# Patient Record
Sex: MALE | Race: WHITE | Employment: OTHER | URBAN - METROPOLITAN AREA
[De-identification: names, ages, dates, MRNs, and addresses within clinical notes are randomized per-mention and may not be internally consistent; named-entity substitution may affect disease eponyms.]

---

## 2018-03-21 ENCOUNTER — HOSPITAL ENCOUNTER (OUTPATIENT)
Dept: SLEEP CENTER | Age: 68
Discharge: HOME | End: 2018-03-21
Payer: MEDICARE

## 2018-03-21 PROCEDURE — 95810 POLYSOM 6/> YRS 4/> PARAM: CPT

## 2018-04-04 ENCOUNTER — OFFICE VISIT (OUTPATIENT)
Dept: FAMILY MEDICINE CLINIC | Age: 68
End: 2018-04-04
Payer: MEDICARE

## 2018-04-04 VITALS
DIASTOLIC BLOOD PRESSURE: 80 MMHG | HEART RATE: 67 BPM | BODY MASS INDEX: 30.1 KG/M2 | SYSTOLIC BLOOD PRESSURE: 130 MMHG | HEIGHT: 71 IN | WEIGHT: 215 LBS | OXYGEN SATURATION: 98 %

## 2018-04-04 DIAGNOSIS — R07.81 RIB PAIN ON LEFT SIDE: Primary | ICD-10-CM

## 2018-04-04 PROCEDURE — G8417 CALC BMI ABV UP PARAM F/U: HCPCS | Performed by: PHYSICIAN ASSISTANT

## 2018-04-04 PROCEDURE — G8427 DOCREV CUR MEDS BY ELIG CLIN: HCPCS | Performed by: PHYSICIAN ASSISTANT

## 2018-04-04 PROCEDURE — 1123F ACP DISCUSS/DSCN MKR DOCD: CPT | Performed by: PHYSICIAN ASSISTANT

## 2018-04-04 PROCEDURE — 3017F COLORECTAL CA SCREEN DOC REV: CPT | Performed by: PHYSICIAN ASSISTANT

## 2018-04-04 PROCEDURE — 1036F TOBACCO NON-USER: CPT | Performed by: PHYSICIAN ASSISTANT

## 2018-04-04 PROCEDURE — 96372 THER/PROPH/DIAG INJ SC/IM: CPT | Performed by: PHYSICIAN ASSISTANT

## 2018-04-04 PROCEDURE — 99213 OFFICE O/P EST LOW 20 MIN: CPT | Performed by: PHYSICIAN ASSISTANT

## 2018-04-04 PROCEDURE — 4040F PNEUMOC VAC/ADMIN/RCVD: CPT | Performed by: PHYSICIAN ASSISTANT

## 2018-04-04 RX ORDER — IBUPROFEN 800 MG/1
800 TABLET ORAL EVERY 6 HOURS PRN
Qty: 30 TABLET | Refills: 0 | Status: CANCELLED | OUTPATIENT
Start: 2018-04-04

## 2018-04-04 RX ORDER — MELOXICAM 7.5 MG/1
7.5 TABLET ORAL DAILY
Qty: 15 TABLET | Refills: 0 | Status: SHIPPED | OUTPATIENT
Start: 2018-04-04 | End: 2018-04-19 | Stop reason: CLARIF

## 2018-04-06 NOTE — PROGRESS NOTES
[address]                                SLEEP STUDY REPORT    PATIENT NAME: Bing Merritt                        :        1950  MED REC NO:   [id number]                            ROOM:  ACCOUNT NO:   [de-identified]                           ADMIT DATE: 2018  PROVIDER:     Ras Ellsworth MD    DATE OF STUDY:  2018    The patient had diagnostic PSG and this is an over-read report of Dr. Svitlana Díaz. I agree with the interpretation. The patient had good quantity and quality  of sleep. Clinical correlation is needed. Appropriate followup is  recommended.         Everett Lake MD    D: 2018 16:19:57       T: 2018 5:33:26     [email or web address]  Job#: [id number]     Doc#: [id number]    CC:
assess the hypersomnia and  rule out narcolepsy; particularly, if there is other criteria that may  suggest this diagnosis. Thank you kindly.         David Alexis MD    D: 03/25/2018 13:39:58       T: 03/26/2018 1:35:37     [email or web address]  Job#: [id number]     Doc#: [id number]    CC:

## 2018-04-19 ENCOUNTER — OFFICE VISIT (OUTPATIENT)
Dept: FAMILY MEDICINE CLINIC | Age: 68
End: 2018-04-19
Payer: MEDICARE

## 2018-04-19 VITALS
WEIGHT: 209 LBS | BODY MASS INDEX: 29.26 KG/M2 | RESPIRATION RATE: 18 BRPM | SYSTOLIC BLOOD PRESSURE: 138 MMHG | OXYGEN SATURATION: 99 % | HEART RATE: 83 BPM | DIASTOLIC BLOOD PRESSURE: 82 MMHG | HEIGHT: 71 IN

## 2018-04-19 DIAGNOSIS — I10 ESSENTIAL HYPERTENSION: Primary | ICD-10-CM

## 2018-04-19 DIAGNOSIS — E11.9 TYPE 2 DIABETES MELLITUS WITHOUT COMPLICATION, WITHOUT LONG-TERM CURRENT USE OF INSULIN (HCC): ICD-10-CM

## 2018-04-19 DIAGNOSIS — F33.1 MODERATE EPISODE OF RECURRENT MAJOR DEPRESSIVE DISORDER (HCC): ICD-10-CM

## 2018-04-19 LAB — HBA1C MFR BLD: 5.7 %

## 2018-04-19 PROCEDURE — 99213 OFFICE O/P EST LOW 20 MIN: CPT | Performed by: FAMILY MEDICINE

## 2018-04-19 PROCEDURE — 83036 HEMOGLOBIN GLYCOSYLATED A1C: CPT | Performed by: FAMILY MEDICINE

## 2018-04-19 PROCEDURE — 3044F HG A1C LEVEL LT 7.0%: CPT | Performed by: FAMILY MEDICINE

## 2018-04-19 PROCEDURE — G8417 CALC BMI ABV UP PARAM F/U: HCPCS | Performed by: FAMILY MEDICINE

## 2018-04-19 PROCEDURE — 3017F COLORECTAL CA SCREEN DOC REV: CPT | Performed by: FAMILY MEDICINE

## 2018-04-19 PROCEDURE — G8427 DOCREV CUR MEDS BY ELIG CLIN: HCPCS | Performed by: FAMILY MEDICINE

## 2018-04-19 PROCEDURE — 1036F TOBACCO NON-USER: CPT | Performed by: FAMILY MEDICINE

## 2018-04-19 PROCEDURE — 1123F ACP DISCUSS/DSCN MKR DOCD: CPT | Performed by: FAMILY MEDICINE

## 2018-04-19 PROCEDURE — 2022F DILAT RTA XM EVC RTNOPTHY: CPT | Performed by: FAMILY MEDICINE

## 2018-04-19 PROCEDURE — 4040F PNEUMOC VAC/ADMIN/RCVD: CPT | Performed by: FAMILY MEDICINE

## 2018-04-19 RX ORDER — FLUOXETINE 10 MG/1
10 TABLET, FILM COATED ORAL DAILY
Qty: 30 TABLET | Refills: 3 | Status: SHIPPED | OUTPATIENT
Start: 2018-04-19 | End: 2018-05-17 | Stop reason: DRUGHIGH

## 2018-04-19 RX ORDER — AMLODIPINE BESYLATE 5 MG/1
5 TABLET ORAL DAILY
Qty: 90 TABLET | Refills: 3 | Status: SHIPPED | OUTPATIENT
Start: 2018-04-19 | End: 2018-05-17 | Stop reason: SDUPTHER

## 2018-04-19 RX ORDER — ATORVASTATIN CALCIUM 20 MG/1
20 TABLET, FILM COATED ORAL DAILY
Qty: 90 TABLET | Refills: 3 | Status: SHIPPED | OUTPATIENT
Start: 2018-04-19 | End: 2019-05-16 | Stop reason: SDUPTHER

## 2018-04-19 ASSESSMENT — ENCOUNTER SYMPTOMS
SHORTNESS OF BREATH: 0
WHEEZING: 0
COUGH: 0

## 2018-04-26 RX ORDER — MINERAL OIL 100 MG/100ML
OIL ORAL; TOPICAL
Qty: 1 DEVICE | Refills: 0 | Status: SHIPPED | OUTPATIENT
Start: 2018-04-26

## 2018-05-17 ENCOUNTER — OFFICE VISIT (OUTPATIENT)
Dept: FAMILY MEDICINE CLINIC | Age: 68
End: 2018-05-17
Payer: MEDICARE

## 2018-05-17 VITALS
SYSTOLIC BLOOD PRESSURE: 122 MMHG | HEART RATE: 76 BPM | BODY MASS INDEX: 28.38 KG/M2 | OXYGEN SATURATION: 97 % | HEIGHT: 71 IN | WEIGHT: 202.7 LBS | RESPIRATION RATE: 18 BRPM | DIASTOLIC BLOOD PRESSURE: 72 MMHG

## 2018-05-17 DIAGNOSIS — I10 ESSENTIAL HYPERTENSION: ICD-10-CM

## 2018-05-17 DIAGNOSIS — F33.1 MODERATE EPISODE OF RECURRENT MAJOR DEPRESSIVE DISORDER (HCC): Primary | ICD-10-CM

## 2018-05-17 DIAGNOSIS — M54.5 CHRONIC MIDLINE LOW BACK PAIN, WITH SCIATICA PRESENCE UNSPECIFIED: ICD-10-CM

## 2018-05-17 DIAGNOSIS — G89.29 CHRONIC MIDLINE LOW BACK PAIN, WITH SCIATICA PRESENCE UNSPECIFIED: ICD-10-CM

## 2018-05-17 PROCEDURE — G8427 DOCREV CUR MEDS BY ELIG CLIN: HCPCS | Performed by: FAMILY MEDICINE

## 2018-05-17 PROCEDURE — 99213 OFFICE O/P EST LOW 20 MIN: CPT | Performed by: FAMILY MEDICINE

## 2018-05-17 PROCEDURE — G8417 CALC BMI ABV UP PARAM F/U: HCPCS | Performed by: FAMILY MEDICINE

## 2018-05-17 PROCEDURE — 3017F COLORECTAL CA SCREEN DOC REV: CPT | Performed by: FAMILY MEDICINE

## 2018-05-17 PROCEDURE — 1123F ACP DISCUSS/DSCN MKR DOCD: CPT | Performed by: FAMILY MEDICINE

## 2018-05-17 PROCEDURE — 1036F TOBACCO NON-USER: CPT | Performed by: FAMILY MEDICINE

## 2018-05-17 PROCEDURE — 4040F PNEUMOC VAC/ADMIN/RCVD: CPT | Performed by: FAMILY MEDICINE

## 2018-05-17 RX ORDER — FLUOXETINE 20 MG/1
20 TABLET, FILM COATED ORAL DAILY
Qty: 30 TABLET | Refills: 5 | Status: SHIPPED | OUTPATIENT
Start: 2018-05-17 | End: 2018-05-29

## 2018-05-17 RX ORDER — AMLODIPINE BESYLATE 5 MG/1
5 TABLET ORAL DAILY
Qty: 90 TABLET | Refills: 6 | Status: SHIPPED | OUTPATIENT
Start: 2018-05-17 | End: 2019-02-18 | Stop reason: SDUPTHER

## 2018-05-17 ASSESSMENT — ENCOUNTER SYMPTOMS
WHEEZING: 0
VOMITING: 0
SHORTNESS OF BREATH: 0
NAUSEA: 0
ABDOMINAL PAIN: 0
CONSTIPATION: 0
DIARRHEA: 0
COUGH: 0

## 2018-05-29 ENCOUNTER — TELEPHONE (OUTPATIENT)
Dept: FAMILY MEDICINE CLINIC | Age: 68
End: 2018-05-29

## 2018-05-29 DIAGNOSIS — F33.1 MODERATE EPISODE OF RECURRENT MAJOR DEPRESSIVE DISORDER (HCC): Primary | ICD-10-CM

## 2018-05-29 RX ORDER — ESCITALOPRAM OXALATE 10 MG/1
10 TABLET ORAL DAILY
Qty: 30 TABLET | Refills: 3 | Status: SHIPPED | OUTPATIENT
Start: 2018-05-29 | End: 2018-07-19 | Stop reason: SDUPTHER

## 2018-07-19 ENCOUNTER — OFFICE VISIT (OUTPATIENT)
Dept: FAMILY MEDICINE CLINIC | Age: 68
End: 2018-07-19
Payer: MEDICARE

## 2018-07-19 VITALS
OXYGEN SATURATION: 99 % | WEIGHT: 203 LBS | SYSTOLIC BLOOD PRESSURE: 134 MMHG | HEIGHT: 71 IN | DIASTOLIC BLOOD PRESSURE: 82 MMHG | RESPIRATION RATE: 18 BRPM | HEART RATE: 71 BPM | BODY MASS INDEX: 28.42 KG/M2

## 2018-07-19 DIAGNOSIS — H93.13 TINNITUS OF BOTH EARS: ICD-10-CM

## 2018-07-19 DIAGNOSIS — F33.1 MODERATE EPISODE OF RECURRENT MAJOR DEPRESSIVE DISORDER (HCC): Primary | ICD-10-CM

## 2018-07-19 DIAGNOSIS — I10 ESSENTIAL HYPERTENSION: ICD-10-CM

## 2018-07-19 PROCEDURE — 4040F PNEUMOC VAC/ADMIN/RCVD: CPT | Performed by: FAMILY MEDICINE

## 2018-07-19 PROCEDURE — G8427 DOCREV CUR MEDS BY ELIG CLIN: HCPCS | Performed by: FAMILY MEDICINE

## 2018-07-19 PROCEDURE — 1101F PT FALLS ASSESS-DOCD LE1/YR: CPT | Performed by: FAMILY MEDICINE

## 2018-07-19 PROCEDURE — 3017F COLORECTAL CA SCREEN DOC REV: CPT | Performed by: FAMILY MEDICINE

## 2018-07-19 PROCEDURE — 99213 OFFICE O/P EST LOW 20 MIN: CPT | Performed by: FAMILY MEDICINE

## 2018-07-19 PROCEDURE — 1036F TOBACCO NON-USER: CPT | Performed by: FAMILY MEDICINE

## 2018-07-19 PROCEDURE — G8417 CALC BMI ABV UP PARAM F/U: HCPCS | Performed by: FAMILY MEDICINE

## 2018-07-19 PROCEDURE — 1123F ACP DISCUSS/DSCN MKR DOCD: CPT | Performed by: FAMILY MEDICINE

## 2018-07-19 RX ORDER — ESCITALOPRAM OXALATE 20 MG/1
20 TABLET ORAL DAILY
Qty: 90 TABLET | Refills: 3 | Status: SHIPPED | OUTPATIENT
Start: 2018-07-19 | End: 2018-12-06

## 2018-07-19 ASSESSMENT — ENCOUNTER SYMPTOMS
SHORTNESS OF BREATH: 0
NAUSEA: 0
DIARRHEA: 0
WHEEZING: 0
ABDOMINAL PAIN: 0

## 2018-07-19 ASSESSMENT — PATIENT HEALTH QUESTIONNAIRE - PHQ9
1. LITTLE INTEREST OR PLEASURE IN DOING THINGS: 0
SUM OF ALL RESPONSES TO PHQ QUESTIONS 1-9: 0
SUM OF ALL RESPONSES TO PHQ9 QUESTIONS 1 & 2: 0
2. FEELING DOWN, DEPRESSED OR HOPELESS: 0

## 2018-07-19 NOTE — PROGRESS NOTES
Does not apply route daily Pt uses Clear Choice Glucose Meter. 100 each 0    Calcium Citrate-Vitamin D (CALCET CREAMY BITES) 500-400 MG-UNIT CHEW tablet Take 1 tablet by mouth daily      vitamin D (CHOLECALCIFEROL) 1000 UNIT TABS tablet Take 1,000 Units by mouth daily      vitamin B-12 (CYANOCOBALAMIN) 50 MCG tablet Take 50 mcg by mouth daily      ferrous gluconate (FERGON) 240 (27 Fe) MG tablet Take 240 mg by mouth 3 times daily (with meals)      aspirin 81 MG EC tablet Take 81 mg by mouth daily.  Multiple Vitamin (MVI, OPURITY BYPASS OPTIMIZED, CHEW TAB) Take 1 tablet by mouth 2 times daily. No current facility-administered medications on file prior to visit. No Known Allergies    Past medical, surgical, social and/or family history reviewed, updated as needed, and are non-contributory (unless otherwise stated). Medications, allergies, and problem list also reviewed and updated as needed in patient's record. Wt Readings from Last 3 Encounters:   07/19/18 203 lb (92.1 kg)   05/17/18 202 lb 11.2 oz (91.9 kg)   04/19/18 209 lb (94.8 kg)                   /82 (Site: Left Arm, Position: Sitting)   Pulse 71   Resp 18   Ht 5' 11\" (1.803 m)   Wt 203 lb (92.1 kg)   SpO2 99%   BMI 28.31 kg/m²       Physical Exam   Constitutional: He appears well-developed and well-nourished. No distress. HENT:   Head: Normocephalic and atraumatic. Cardiovascular: Normal rate, regular rhythm, normal heart sounds and intact distal pulses. No murmur heard. Pulmonary/Chest: Effort normal. No respiratory distress. He has no wheezes. He has no rales. Abdominal: Bowel sounds are normal. He exhibits no distension. There is no tenderness. There is no rebound and no guarding. Musculoskeletal: Normal range of motion. He exhibits no edema. Skin: He is not diaphoretic. Nursing note and vitals reviewed.     Results for orders placed or performed in visit on 04/19/18   POCT glycosylated hemoglobin (Hb A1C)   Result Value Ref Range    Hemoglobin A1C 5.7 %       ASSESSMENT/PLAN  Martha Prieto was seen today for depression and tinnitus. Diagnoses and all orders for this visit:    Moderate episode of recurrent major depressive disorder (HCC)  -     escitalopram (LEXAPRO) 20 MG tablet; Take 1 tablet by mouth daily  - Will increase lexapro     Essential hypertension   - Controlled. Continue current meds. Tinnitus of both ears   - Discussed following up with ENT about tinnitus    - Will trial stopping aspirin for a few weeks , if tinnitus does not improve- patient instructed to restart aspirin. Other orders  -     hydrocortisone 2.5 % ointment; Apply topically 2 times daily. Phone/MyChart follow up if tests abnormal.    Return in about 3 months (around 10/19/2018) for depression . or sooner if necessary. I have reviewed my findings and recommendations with Martha Prieto. Charlotte Ruiz.  Anita Chavez M.D

## 2018-07-31 ENCOUNTER — TELEPHONE (OUTPATIENT)
Dept: FAMILY MEDICINE CLINIC | Age: 68
End: 2018-07-31

## 2018-07-31 DIAGNOSIS — H93.19 TINNITUS, UNSPECIFIED LATERALITY: Primary | ICD-10-CM

## 2018-07-31 NOTE — TELEPHONE ENCOUNTER
Pt states he is still experiencing ringing in his ears. He is requesting referral to ENT that you spoke about at his last visit.

## 2018-09-04 ENCOUNTER — OFFICE VISIT (OUTPATIENT)
Dept: ENT CLINIC | Age: 68
End: 2018-09-04
Payer: MEDICARE

## 2018-09-04 ENCOUNTER — PROCEDURE VISIT (OUTPATIENT)
Dept: AUDIOLOGY | Age: 68
End: 2018-09-04
Payer: MEDICARE

## 2018-09-04 VITALS
HEIGHT: 71 IN | DIASTOLIC BLOOD PRESSURE: 86 MMHG | HEART RATE: 60 BPM | WEIGHT: 200 LBS | BODY MASS INDEX: 28 KG/M2 | SYSTOLIC BLOOD PRESSURE: 150 MMHG

## 2018-09-04 DIAGNOSIS — H90.3 SENSORINEURAL HEARING LOSS (SNHL) OF BOTH EARS: ICD-10-CM

## 2018-09-04 DIAGNOSIS — H93.13 TINNITUS OF BOTH EARS: Primary | ICD-10-CM

## 2018-09-04 DIAGNOSIS — H93.13 NEW ONSET TINNITUS OF BOTH EARS: Primary | ICD-10-CM

## 2018-09-04 PROCEDURE — 92567 TYMPANOMETRY: CPT | Performed by: AUDIOLOGIST

## 2018-09-04 PROCEDURE — 1036F TOBACCO NON-USER: CPT | Performed by: OTOLARYNGOLOGY

## 2018-09-04 PROCEDURE — G8417 CALC BMI ABV UP PARAM F/U: HCPCS | Performed by: OTOLARYNGOLOGY

## 2018-09-04 PROCEDURE — 99203 OFFICE O/P NEW LOW 30 MIN: CPT | Performed by: OTOLARYNGOLOGY

## 2018-09-04 PROCEDURE — 1101F PT FALLS ASSESS-DOCD LE1/YR: CPT | Performed by: OTOLARYNGOLOGY

## 2018-09-04 PROCEDURE — 4040F PNEUMOC VAC/ADMIN/RCVD: CPT | Performed by: OTOLARYNGOLOGY

## 2018-09-04 PROCEDURE — 1123F ACP DISCUSS/DSCN MKR DOCD: CPT | Performed by: OTOLARYNGOLOGY

## 2018-09-04 PROCEDURE — 92556 SPEECH AUDIOMETRY COMPLETE: CPT | Performed by: AUDIOLOGIST

## 2018-09-04 PROCEDURE — G8427 DOCREV CUR MEDS BY ELIG CLIN: HCPCS | Performed by: OTOLARYNGOLOGY

## 2018-09-04 PROCEDURE — 92552 PURE TONE AUDIOMETRY AIR: CPT | Performed by: AUDIOLOGIST

## 2018-09-04 PROCEDURE — 3017F COLORECTAL CA SCREEN DOC REV: CPT | Performed by: OTOLARYNGOLOGY

## 2018-09-04 NOTE — PROGRESS NOTES
[facility] to [facility] then had went to a doctor in [facility], biochemical      Drug use: No    Sexual activity: Not Currently     Partners: Female     Other Topics Concern    Not on file     Social History Narrative    No narrative on file       REVIEW OF SYSTEMS:                                                                                                       Review of Systems  Constitutional: Negative for chills and fever. Eyes: Negative for discharge and itching. ENT: Negative for congestion, ear discharge, ear pain, and sore throat. Respiratory: Negative for chest tightness and shortness of breath. Cardiovascular: Negative for chest pain and palpitations. Gastrointestinal: Negative for abdominal distention and abdominal pain. Endocrinology: Negative for heat and cold intolerance. Neurological: Negative for focal weakness or seizure   Skin: Negative for rash and itchiness   Psychiatric: Negative for depression and hallucinations     PHYSICAL EXAM:                                                                                                                BP (!) 150/86 (Site: Left Arm, Position: Sitting, Cuff Size: Medium Adult)   Pulse 60   Ht 5' 11\" (1.803 m)   Wt 200 lb (90.7 kg)   BMI 27.89 kg/m²   Physical Exam  Constitutional: Appears well-developed and well-nourished. Appears as stated age. Eyes: Lids and lashes normal, pupils equal, extra ocular muscles intact, sclera clear   ENT: Normocephalic, without obvious abnormality, atraumatic, sinuses nontender on palpation,   Ear exam - bilateral normal, TM intact without perforation or effusion, external canal normal. No significant ceruminosis noted, external ears without lesions,   Oral pharynx with moist mucus membranes, tonsils without erythema or exudates, gums normal and good dentition.    Neck:  Supple, symmetrical, trachea midline, no adenopathy, thyroid symmetric, not enlarged and no tenderness, skin normal   Respiratory: No increased work of breathing. No stridor or wheezes   Cardiovascular: Regular rate   Skin: Warm and dry   Neurologic:  Alert and oriented     ASSESSMENT AND PLAN:                                                                                                  Diagnosis Orders   1. Tinnitus of both ears     2. Sensorineural hearing loss (SNHL) of both ears       80 y.o. male presents with bilateral tinnitus possible secondary to SNHL    · Audio and tymp done in office, tymp WNL, audio with high frequency falling config SNHL  · Hearing protection advised  · Follow up in 12 month(s) or sooner if symptoms acutely exacerbate    Patient was seen and examined with attending physician, who agrees with the assessment and plans. Kolby Stack DO  Resident Physician  [facility])  Otolaryngology Residency  9/4/2018  11:00 AM            Janneth Lopez  1950      I have discussed the case, including pertinent history and exam findings with the resident. I have seen and examined the patient and the key elements of the encounter have been performed by me. I agree with the assessment, plan and orders as documented by the resident. Patient here for follow up of medical problems. Remainder of medical problems as per resident note.       [address] Corey Griffith DO Bharat  9/19/18 yes

## 2018-10-25 ENCOUNTER — HOSPITAL ENCOUNTER (OUTPATIENT)
Age: 68
Discharge: HOME | End: 2018-10-27
Payer: MEDICARE

## 2018-10-25 ENCOUNTER — OFFICE VISIT (OUTPATIENT)
Dept: FAMILY MEDICINE CLINIC | Age: 68
End: 2018-10-25
Payer: MEDICARE

## 2018-10-25 VITALS
RESPIRATION RATE: 18 BRPM | DIASTOLIC BLOOD PRESSURE: 80 MMHG | BODY MASS INDEX: 28.7 KG/M2 | SYSTOLIC BLOOD PRESSURE: 122 MMHG | HEIGHT: 71 IN | OXYGEN SATURATION: 98 % | HEART RATE: 76 BPM | WEIGHT: 205 LBS

## 2018-10-25 DIAGNOSIS — E11.9 TYPE 2 DIABETES MELLITUS WITHOUT COMPLICATION, WITHOUT LONG-TERM CURRENT USE OF INSULIN (HCC): Primary | ICD-10-CM

## 2018-10-25 DIAGNOSIS — E11.9 TYPE 2 DIABETES MELLITUS WITHOUT COMPLICATION, WITHOUT LONG-TERM CURRENT USE OF INSULIN (HCC): ICD-10-CM

## 2018-10-25 DIAGNOSIS — E78.00 HYPERCHOLESTEREMIA: ICD-10-CM

## 2018-10-25 DIAGNOSIS — F33.1 MODERATE EPISODE OF RECURRENT MAJOR DEPRESSIVE DISORDER (HCC): ICD-10-CM

## 2018-10-25 LAB
ALBUMIN SERPL-MCNC: 4.4 G/DL (ref 3.5–5.2)
ALP BLD-CCNC: 92 U/L (ref 40–129)
ALT SERPL-CCNC: 23 U/L (ref 0–40)
ANION GAP SERPL CALCULATED.3IONS-SCNC: 13 MMOL/L (ref 7–16)
AST SERPL-CCNC: 26 U/L (ref 0–39)
BILIRUB SERPL-MCNC: 0.3 MG/DL (ref 0–1.2)
BUN BLDV-MCNC: 14 MG/DL (ref 8–23)
CALCIUM SERPL-MCNC: 9.3 MG/DL (ref 8.6–10.2)
CHLORIDE BLD-SCNC: 102 MMOL/L (ref 98–107)
CHOLESTEROL, TOTAL: 161 MG/DL (ref 0–199)
CO2: 26 MMOL/L (ref 22–29)
CREAT SERPL-MCNC: 1.2 MG/DL (ref 0.7–1.2)
CREATININE URINE: 99 MG/DL (ref 40–278)
GFR AFRICAN AMERICAN: >60
GFR NON-AFRICAN AMERICAN: >60 ML/MIN/1.73
GLUCOSE BLD-MCNC: 102 MG/DL (ref 74–109)
HBA1C MFR BLD: 6.2 % (ref 4–5.6)
HCT VFR BLD CALC: 37.2 % (ref 37–54)
HDLC SERPL-MCNC: 64 MG/DL
HEMOGLOBIN: 11.2 G/DL (ref 12.5–16.5)
LDL CHOLESTEROL CALCULATED: 82 MG/DL (ref 0–99)
MCH RBC QN AUTO: 26.5 PG (ref 26–35)
MCHC RBC AUTO-ENTMCNC: 30.1 % (ref 32–34.5)
MCV RBC AUTO: 87.9 FL (ref 80–99.9)
MICROALBUMIN UR-MCNC: <12 MG/L
MICROALBUMIN/CREAT UR-RTO: ABNORMAL (ref 0–30)
PDW BLD-RTO: 17 FL (ref 11.5–15)
PLATELET # BLD: 282 E9/L (ref 130–450)
PMV BLD AUTO: 12.9 FL (ref 7–12)
POTASSIUM SERPL-SCNC: 4.3 MMOL/L (ref 3.5–5)
RBC # BLD: 4.23 E12/L (ref 3.8–5.8)
SODIUM BLD-SCNC: 141 MMOL/L (ref 132–146)
TOTAL PROTEIN: 7.4 G/DL (ref 6.4–8.3)
TRIGL SERPL-MCNC: 74 MG/DL (ref 0–149)
VLDLC SERPL CALC-MCNC: 15 MG/DL
WBC # BLD: 5.7 E9/L (ref 4.5–11.5)

## 2018-10-25 PROCEDURE — G8482 FLU IMMUNIZE ORDER/ADMIN: HCPCS | Performed by: FAMILY MEDICINE

## 2018-10-25 PROCEDURE — 80053 COMPREHEN METABOLIC PANEL: CPT

## 2018-10-25 PROCEDURE — 3044F HG A1C LEVEL LT 7.0%: CPT | Performed by: FAMILY MEDICINE

## 2018-10-25 PROCEDURE — 82570 ASSAY OF URINE CREATININE: CPT

## 2018-10-25 PROCEDURE — 3017F COLORECTAL CA SCREEN DOC REV: CPT | Performed by: FAMILY MEDICINE

## 2018-10-25 PROCEDURE — 1101F PT FALLS ASSESS-DOCD LE1/YR: CPT | Performed by: FAMILY MEDICINE

## 2018-10-25 PROCEDURE — G8417 CALC BMI ABV UP PARAM F/U: HCPCS | Performed by: FAMILY MEDICINE

## 2018-10-25 PROCEDURE — 1123F ACP DISCUSS/DSCN MKR DOCD: CPT | Performed by: FAMILY MEDICINE

## 2018-10-25 PROCEDURE — 2022F DILAT RTA XM EVC RTNOPTHY: CPT | Performed by: FAMILY MEDICINE

## 2018-10-25 PROCEDURE — 80061 LIPID PANEL: CPT

## 2018-10-25 PROCEDURE — 4040F PNEUMOC VAC/ADMIN/RCVD: CPT | Performed by: FAMILY MEDICINE

## 2018-10-25 PROCEDURE — 99213 OFFICE O/P EST LOW 20 MIN: CPT | Performed by: FAMILY MEDICINE

## 2018-10-25 PROCEDURE — 85027 COMPLETE CBC AUTOMATED: CPT

## 2018-10-25 PROCEDURE — 83036 HEMOGLOBIN GLYCOSYLATED A1C: CPT

## 2018-10-25 PROCEDURE — G8427 DOCREV CUR MEDS BY ELIG CLIN: HCPCS | Performed by: FAMILY MEDICINE

## 2018-10-25 PROCEDURE — 82044 UR ALBUMIN SEMIQUANTITATIVE: CPT

## 2018-10-25 PROCEDURE — 1036F TOBACCO NON-USER: CPT | Performed by: FAMILY MEDICINE

## 2018-10-25 ASSESSMENT — ENCOUNTER SYMPTOMS
ABDOMINAL PAIN: 0
VOMITING: 0
COUGH: 0
NAUSEA: 0
CONSTIPATION: 0
WHEEZING: 0
SHORTNESS OF BREATH: 0
DIARRHEA: 0

## 2018-10-25 ASSESSMENT — PATIENT HEALTH QUESTIONNAIRE - PHQ9
2. FEELING DOWN, DEPRESSED OR HOPELESS: 0
1. LITTLE INTEREST OR PLEASURE IN DOING THINGS: 0
SUM OF ALL RESPONSES TO PHQ QUESTIONS 1-9: 0
SUM OF ALL RESPONSES TO PHQ9 QUESTIONS 1 & 2: 0
SUM OF ALL RESPONSES TO PHQ QUESTIONS 1-9: 0

## 2018-10-25 NOTE — PROGRESS NOTES
[address] [facility]  [phone]   Brenda Loaiza MD     Patient: Suhas Penn  YOB: 1950  Visit Date: 10/25/18    Jeff Shaffer is a 80y.o. year old male here today for   Chief Complaint   Patient presents with    Depression     3 mo f/u        HPI  Patient is a 80year old male here for follow up of depression and diabetes. Depression - lexapro was increased at last visit. Mood has been better. No concerns. Has been trying to be more physically active. Diabetes. Does not really check his sugars. Last A1c is 6.1 . Is currently is diet controlled. No increase in thirst . Has some urinary frequency. Has been a year since he has been to the eye doctor. Review of Systems   Constitutional: Negative for chills and fever. Respiratory: Negative for cough, shortness of breath and wheezing. Cardiovascular: Negative for chest pain, palpitations and leg swelling. Gastrointestinal: Negative for abdominal pain, constipation, diarrhea, nausea and vomiting. Current Outpatient Prescriptions on File Prior to Visit   Medication Sig Dispense Refill    escitalopram (LEXAPRO) 20 MG tablet Take 1 tablet by mouth daily 90 tablet 3    amLODIPine (NORVASC) 5 MG tablet Take 1 tablet by mouth daily 90 tablet 6    Blood Pressure Monitor LARY Use daily to check blood pressure 1 Device 0    atorvastatin (LIPITOR) 20 MG tablet Take 1 tablet by mouth daily 90 tablet 3    Glucose Blood (GLUCOSE METER TEST VI) 1 each by In Vitro route daily      glucose blood VI test strips (ASCENSIA AUTODISC VI;ONE TOUCH ULTRA TEST VI) strip 1 each by In Vitro route daily Pt uses Clear Choice Glucose Meter. 100 each 0    Lancets 30G MISC 1 each by Does not apply route daily Pt uses Clear Choice Glucose Meter.  100 each 0    Calcium Citrate-Vitamin D (CALCET CREAMY BITES) 500-400 MG-UNIT CHEW tablet Take 1 tablet by mouth daily      vitamin D (CHOLECALCIFEROL) 1000 UNIT TABS tablet Take 1,000 Units by mouth daily      vitamin B-12 (CYANOCOBALAMIN) 50 MCG tablet Take 50 mcg by mouth daily      ferrous gluconate (FERGON) 240 (27 Fe) MG tablet Take 240 mg by mouth 3 times daily (with meals)      aspirin 81 MG EC tablet Take 81 mg by mouth daily. No current facility-administered medications on file prior to visit. No Known Allergies    Past medical, surgical, socialand/or family history reviewed, updated as needed, and are non-contributory (unless otherwise stated). Medications, allergies, and problem list also reviewed and updated as needed in patient's record. Wt Readings from Last 3 Encounters:   10/25/18 205 lb (93 kg)   09/04/18 200 lb (90.7 kg)   07/19/18 203 lb (92.1 kg)                   /80 (Site: Left Upper Arm, Position: Sitting)   Pulse 76   Resp 18   Ht 5' 11\" (1.803 m)   Wt 205 lb (93 kg)   SpO2 98%   BMI 28.59 kg/m²       Physical Exam   Constitutional: He appears well-developed and well-nourished. No distress. HENT:   Head: Normocephalic and atraumatic. Cardiovascular: Normal rate, regular rhythm, normal heart sounds and intact distal pulses. No murmur heard. Pulmonary/Chest: Effort normal. No respiratory distress. He has no wheezes. He has no rales. Abdominal: Bowel sounds are normal. He exhibits no distension. There is no tenderness. There is no rebound and no guarding. Musculoskeletal: Normal range of motion. He exhibits no edema. Skin: He is not diaphoretic. Nursing note and vitals reviewed. Results for orders placed or performed in visit on 04/19/18   POCT glycosylated hemoglobin (Hb A1C)   Result Value Ref Range    Hemoglobin A1C 5.7 %       ASSESSMENT/PLAN  Kayce Levin was seen today for depression. Diagnoses and all orders for this visit:    Type 2 diabetes mellitus without complication, without long-term current use of insulin (HCC)  -     CBC;  Future  -     Hemoglobin A1C; Future  -     Microalbumin / Creatinine Urine Ratio; Future  - Has been diet controlled. Moderate episode of recurrent major depressive disorder (HCC)  -     CBC; Future    Hypercholesteremia  -     Comprehensive Metabolic Panel; Future  -     Lipid Panel; Future            Phone/MyChart follow up if tests abnormal.    Return in about 6 months (around 4/25/2019). or sooner if necessary. I have reviewed myfindings and recommendations with Radha Selby. Leilani Leonard.  Luigi Marroquin M.D

## 2018-11-09 ENCOUNTER — TELEPHONE (OUTPATIENT)
Dept: FAMILY MEDICINE CLINIC | Age: 68
End: 2018-11-09

## 2018-11-09 DIAGNOSIS — D64.9 ANEMIA, UNSPECIFIED TYPE: Primary | ICD-10-CM

## 2018-11-27 ENCOUNTER — HOSPITAL ENCOUNTER (EMERGENCY)
Age: 68
Discharge: HOME | End: 2018-11-27
Payer: MEDICARE

## 2018-11-27 ENCOUNTER — APPOINTMENT (OUTPATIENT)
Dept: CT IMAGING | Age: 68
End: 2018-11-27
Payer: MEDICARE

## 2018-11-27 VITALS
SYSTOLIC BLOOD PRESSURE: 134 MMHG | OXYGEN SATURATION: 97 % | HEART RATE: 82 BPM | WEIGHT: 210 LBS | RESPIRATION RATE: 20 BRPM | BODY MASS INDEX: 29.29 KG/M2 | DIASTOLIC BLOOD PRESSURE: 87 MMHG | TEMPERATURE: 98.4 F

## 2018-11-27 DIAGNOSIS — K59.00 CONSTIPATION, UNSPECIFIED CONSTIPATION TYPE: ICD-10-CM

## 2018-11-27 DIAGNOSIS — R10.9 ABDOMINAL PAIN, UNSPECIFIED ABDOMINAL LOCATION: Primary | ICD-10-CM

## 2018-11-27 DIAGNOSIS — K52.9 COLITIS: ICD-10-CM

## 2018-11-27 LAB
BACTERIA: NORMAL /HPF
BASOPHILS ABSOLUTE: 0.07 E9/L (ref 0–0.2)
BASOPHILS RELATIVE PERCENT: 0.6 % (ref 0–2)
BILIRUBIN URINE: NEGATIVE
BLOOD, URINE: NEGATIVE
CLARITY: CLEAR
CO2: 26 MMOL/L (ref 22–29)
COLOR: YELLOW
EOSINOPHILS ABSOLUTE: 0.05 E9/L (ref 0.05–0.5)
EOSINOPHILS RELATIVE PERCENT: 0.5 % (ref 0–6)
EPITHELIAL CELLS, UA: NORMAL /HPF
GFR AFRICAN AMERICAN: >60
GFR NON-AFRICAN AMERICAN: >60 ML/MIN/1.73
GLUCOSE BLD-MCNC: 102 MG/DL (ref 74–99)
GLUCOSE URINE: NEGATIVE MG/DL
HCT VFR BLD CALC: 37.4 % (ref 37–54)
HEMOGLOBIN: 11.9 G/DL (ref 12.5–16.5)
IMMATURE GRANULOCYTES #: 0.02 E9/L
IMMATURE GRANULOCYTES %: 0.2 % (ref 0–5)
KETONES, URINE: NEGATIVE MG/DL
LEUKOCYTE ESTERASE, URINE: ABNORMAL
LYMPHOCYTES ABSOLUTE: 1.96 E9/L (ref 1.5–4)
LYMPHOCYTES RELATIVE PERCENT: 18.2 % (ref 20–42)
MCH RBC QN AUTO: 26.4 PG (ref 26–35)
MCHC RBC AUTO-ENTMCNC: 31.8 % (ref 32–34.5)
MCV RBC AUTO: 83.1 FL (ref 80–99.9)
MONOCYTES ABSOLUTE: 0.89 E9/L (ref 0.1–0.95)
MONOCYTES RELATIVE PERCENT: 8.3 % (ref 2–12)
NEUTROPHILS ABSOLUTE: 7.78 E9/L (ref 1.8–7.3)
NEUTROPHILS RELATIVE PERCENT: 72.2 % (ref 43–80)
NITRITE, URINE: NEGATIVE
PDW BLD-RTO: 16.4 FL (ref 11.5–15)
PH UA: 5.5 (ref 5–9)
PLATELET # BLD: 355 E9/L (ref 130–450)
PMV BLD AUTO: 11.8 FL (ref 7–12)
POC ANION GAP: 10 MMOL/L (ref 7–16)
POC BUN: 19 MG/DL (ref 8–23)
POC CHLORIDE: 104 MMOL/L (ref 100–108)
POC CREATININE: 1 MG/DL (ref 0.7–1.2)
POC POTASSIUM: 4.7 MMOL/L (ref 3.5–5)
POC SODIUM: 140 MMOL/L (ref 132–146)
PROTEIN UA: NEGATIVE MG/DL
RBC # BLD: 4.5 E12/L (ref 3.8–5.8)
RBC UA: NORMAL /HPF (ref 0–2)
SPECIFIC GRAVITY UA: 1.01 (ref 1–1.03)
UROBILINOGEN, URINE: 0.2 E.U./DL
WBC # BLD: 10.8 E9/L (ref 4.5–11.5)
WBC UA: NORMAL /HPF (ref 0–5)

## 2018-11-27 PROCEDURE — 81001 URINALYSIS AUTO W/SCOPE: CPT

## 2018-11-27 PROCEDURE — 85025 COMPLETE CBC W/AUTO DIFF WBC: CPT

## 2018-11-27 PROCEDURE — 74177 CT ABD & PELVIS W/CONTRAST: CPT

## 2018-11-27 PROCEDURE — 80051 ELECTROLYTE PANEL: CPT

## 2018-11-27 PROCEDURE — 84295 ASSAY OF SERUM SODIUM: CPT

## 2018-11-27 PROCEDURE — 82565 ASSAY OF CREATININE: CPT

## 2018-11-27 PROCEDURE — 82435 ASSAY OF BLOOD CHLORIDE: CPT

## 2018-11-27 PROCEDURE — 36415 COLL VENOUS BLD VENIPUNCTURE: CPT

## 2018-11-27 PROCEDURE — 82947 ASSAY GLUCOSE BLOOD QUANT: CPT

## 2018-11-27 PROCEDURE — 84132 ASSAY OF SERUM POTASSIUM: CPT

## 2018-11-27 PROCEDURE — 84520 ASSAY OF UREA NITROGEN: CPT

## 2018-11-27 PROCEDURE — 99213 OFFICE O/P EST LOW 20 MIN: CPT

## 2018-11-27 PROCEDURE — 6360000004 HC RX CONTRAST MEDICATION: Performed by: RADIOLOGY

## 2018-11-27 PROCEDURE — 87088 URINE BACTERIA CULTURE: CPT

## 2018-11-27 RX ORDER — METRONIDAZOLE 500 MG/1
500 TABLET ORAL 3 TIMES DAILY
Qty: 30 TABLET | Refills: 0 | Status: SHIPPED | OUTPATIENT
Start: 2018-11-27 | End: 2018-12-07

## 2018-11-27 RX ORDER — CIPROFLOXACIN 500 MG/1
500 TABLET, FILM COATED ORAL 2 TIMES DAILY
Qty: 20 TABLET | Refills: 0 | Status: SHIPPED | OUTPATIENT
Start: 2018-11-27 | End: 2018-12-07

## 2018-11-27 RX ADMIN — IOHEXOL 50 ML: 240 INJECTION, SOLUTION INTRATHECAL; INTRAVASCULAR; INTRAVENOUS; ORAL at 17:11

## 2018-11-27 RX ADMIN — IOPAMIDOL 80 ML: 755 INJECTION, SOLUTION INTRAVENOUS at 17:12

## 2018-11-27 ASSESSMENT — PAIN SCALES - GENERAL: PAINLEVEL_OUTOF10: 2

## 2018-11-27 ASSESSMENT — PAIN DESCRIPTION - LOCATION: LOCATION: ABDOMEN

## 2018-11-27 ASSESSMENT — PAIN DESCRIPTION - FREQUENCY: FREQUENCY: CONTINUOUS

## 2018-11-27 ASSESSMENT — PAIN DESCRIPTION - ORIENTATION: ORIENTATION: RIGHT;LOWER

## 2018-11-27 ASSESSMENT — PAIN DESCRIPTION - PAIN TYPE: TYPE: ACUTE PAIN

## 2018-11-27 ASSESSMENT — PAIN DESCRIPTION - PROGRESSION: CLINICAL_PROGRESSION: GRADUALLY WORSENING

## 2018-11-30 LAB — URINE CULTURE, ROUTINE: NORMAL

## 2018-12-06 ENCOUNTER — OFFICE VISIT (OUTPATIENT)
Dept: FAMILY MEDICINE CLINIC | Age: 68
End: 2018-12-06
Payer: MEDICARE

## 2018-12-06 VITALS
RESPIRATION RATE: 18 BRPM | BODY MASS INDEX: 29.26 KG/M2 | DIASTOLIC BLOOD PRESSURE: 78 MMHG | WEIGHT: 209 LBS | HEIGHT: 71 IN | HEART RATE: 70 BPM | SYSTOLIC BLOOD PRESSURE: 122 MMHG | OXYGEN SATURATION: 98 %

## 2018-12-06 DIAGNOSIS — D64.9 ANEMIA, UNSPECIFIED TYPE: ICD-10-CM

## 2018-12-06 DIAGNOSIS — R41.3 MEMORY LOSS: Primary | ICD-10-CM

## 2018-12-06 DIAGNOSIS — F33.1 MODERATE EPISODE OF RECURRENT MAJOR DEPRESSIVE DISORDER (HCC): ICD-10-CM

## 2018-12-06 PROCEDURE — 99213 OFFICE O/P EST LOW 20 MIN: CPT | Performed by: FAMILY MEDICINE

## 2018-12-06 PROCEDURE — 1036F TOBACCO NON-USER: CPT | Performed by: FAMILY MEDICINE

## 2018-12-06 PROCEDURE — 1123F ACP DISCUSS/DSCN MKR DOCD: CPT | Performed by: FAMILY MEDICINE

## 2018-12-06 PROCEDURE — 1101F PT FALLS ASSESS-DOCD LE1/YR: CPT | Performed by: FAMILY MEDICINE

## 2018-12-06 PROCEDURE — 3017F COLORECTAL CA SCREEN DOC REV: CPT | Performed by: FAMILY MEDICINE

## 2018-12-06 PROCEDURE — 4040F PNEUMOC VAC/ADMIN/RCVD: CPT | Performed by: FAMILY MEDICINE

## 2018-12-06 PROCEDURE — G8482 FLU IMMUNIZE ORDER/ADMIN: HCPCS | Performed by: FAMILY MEDICINE

## 2018-12-06 PROCEDURE — G8417 CALC BMI ABV UP PARAM F/U: HCPCS | Performed by: FAMILY MEDICINE

## 2018-12-06 PROCEDURE — G8427 DOCREV CUR MEDS BY ELIG CLIN: HCPCS | Performed by: FAMILY MEDICINE

## 2018-12-06 RX ORDER — FLUOXETINE 20 MG/1
20 TABLET, FILM COATED ORAL DAILY
Qty: 90 TABLET | Refills: 3 | Status: SHIPPED | OUTPATIENT
Start: 2018-12-06 | End: 2019-01-07

## 2018-12-06 ASSESSMENT — ENCOUNTER SYMPTOMS
CONSTIPATION: 0
WHEEZING: 0
SHORTNESS OF BREATH: 0
NAUSEA: 0
VOMITING: 0
ABDOMINAL PAIN: 0
DIARRHEA: 0
COUGH: 0

## 2018-12-06 ASSESSMENT — PATIENT HEALTH QUESTIONNAIRE - PHQ9
2. FEELING DOWN, DEPRESSED OR HOPELESS: 1
SUM OF ALL RESPONSES TO PHQ QUESTIONS 1-9: 2
SUM OF ALL RESPONSES TO PHQ QUESTIONS 1-9: 2
SUM OF ALL RESPONSES TO PHQ9 QUESTIONS 1 & 2: 2
1. LITTLE INTEREST OR PLEASURE IN DOING THINGS: 1

## 2018-12-10 ENCOUNTER — TELEPHONE (OUTPATIENT)
Dept: SPEECH THERAPY | Age: 68
End: 2018-12-10

## 2019-01-07 ENCOUNTER — OFFICE VISIT (OUTPATIENT)
Dept: FAMILY MEDICINE CLINIC | Age: 69
End: 2019-01-07
Payer: MEDICARE

## 2019-01-07 VITALS
BODY MASS INDEX: 30.1 KG/M2 | RESPIRATION RATE: 18 BRPM | OXYGEN SATURATION: 98 % | WEIGHT: 215 LBS | SYSTOLIC BLOOD PRESSURE: 124 MMHG | DIASTOLIC BLOOD PRESSURE: 74 MMHG | HEART RATE: 80 BPM | HEIGHT: 71 IN

## 2019-01-07 DIAGNOSIS — R41.3 MEMORY LOSS: ICD-10-CM

## 2019-01-07 DIAGNOSIS — F33.1 MODERATE EPISODE OF RECURRENT MAJOR DEPRESSIVE DISORDER (HCC): Primary | ICD-10-CM

## 2019-01-07 PROCEDURE — G8482 FLU IMMUNIZE ORDER/ADMIN: HCPCS | Performed by: FAMILY MEDICINE

## 2019-01-07 PROCEDURE — 1036F TOBACCO NON-USER: CPT | Performed by: FAMILY MEDICINE

## 2019-01-07 PROCEDURE — 4040F PNEUMOC VAC/ADMIN/RCVD: CPT | Performed by: FAMILY MEDICINE

## 2019-01-07 PROCEDURE — 3017F COLORECTAL CA SCREEN DOC REV: CPT | Performed by: FAMILY MEDICINE

## 2019-01-07 PROCEDURE — 1101F PT FALLS ASSESS-DOCD LE1/YR: CPT | Performed by: FAMILY MEDICINE

## 2019-01-07 PROCEDURE — 99213 OFFICE O/P EST LOW 20 MIN: CPT | Performed by: FAMILY MEDICINE

## 2019-01-07 PROCEDURE — G8427 DOCREV CUR MEDS BY ELIG CLIN: HCPCS | Performed by: FAMILY MEDICINE

## 2019-01-07 PROCEDURE — G8417 CALC BMI ABV UP PARAM F/U: HCPCS | Performed by: FAMILY MEDICINE

## 2019-01-07 PROCEDURE — 1123F ACP DISCUSS/DSCN MKR DOCD: CPT | Performed by: FAMILY MEDICINE

## 2019-01-07 RX ORDER — FLUOXETINE HYDROCHLORIDE 40 MG/1
40 CAPSULE ORAL DAILY
Qty: 90 CAPSULE | Refills: 3 | Status: SHIPPED
Start: 2019-01-07 | End: 2020-02-22 | Stop reason: SDUPTHER

## 2019-01-07 ASSESSMENT — ENCOUNTER SYMPTOMS
COUGH: 0
NAUSEA: 0
SHORTNESS OF BREATH: 0
VOMITING: 0
WHEEZING: 0
DIARRHEA: 0
ABDOMINAL PAIN: 0
CONSTIPATION: 0

## 2019-01-07 ASSESSMENT — PATIENT HEALTH QUESTIONNAIRE - PHQ9
2. FEELING DOWN, DEPRESSED OR HOPELESS: 1
1. LITTLE INTEREST OR PLEASURE IN DOING THINGS: 1
SUM OF ALL RESPONSES TO PHQ QUESTIONS 1-9: 2
SUM OF ALL RESPONSES TO PHQ9 QUESTIONS 1 & 2: 2
SUM OF ALL RESPONSES TO PHQ QUESTIONS 1-9: 2

## 2019-01-09 ENCOUNTER — TELEPHONE (OUTPATIENT)
Dept: SPEECH THERAPY | Age: 69
End: 2019-01-09

## 2019-01-15 ENCOUNTER — HOSPITAL ENCOUNTER (OUTPATIENT)
Age: 69
Discharge: HOME | End: 2019-01-17
Payer: MEDICARE

## 2019-01-15 ENCOUNTER — HOSPITAL ENCOUNTER (OUTPATIENT)
Dept: GENERAL RADIOLOGY | Age: 69
Discharge: HOME | End: 2019-01-17
Payer: MEDICARE

## 2019-01-15 ENCOUNTER — EVALUATION (OUTPATIENT)
Dept: SPEECH THERAPY | Age: 69
End: 2019-01-15
Payer: MEDICARE

## 2019-01-15 DIAGNOSIS — R41.841 COGNITIVE COMMUNICATION DEFICIT: Primary | ICD-10-CM

## 2019-01-15 DIAGNOSIS — R52 PAIN: ICD-10-CM

## 2019-01-15 DIAGNOSIS — T18.9XXA FOREIGN BODY IN DIGESTIVE TRACT, INITIAL ENCOUNTER: ICD-10-CM

## 2019-01-15 PROCEDURE — 92523 SPEECH SOUND LANG COMPREHEN: CPT | Performed by: SPEECH-LANGUAGE PATHOLOGIST

## 2019-01-15 PROCEDURE — 74018 RADEX ABDOMEN 1 VIEW: CPT

## 2019-02-18 ENCOUNTER — OFFICE VISIT (OUTPATIENT)
Dept: FAMILY MEDICINE CLINIC | Age: 69
End: 2019-02-18
Payer: MEDICARE

## 2019-02-18 VITALS
SYSTOLIC BLOOD PRESSURE: 126 MMHG | RESPIRATION RATE: 18 BRPM | HEIGHT: 71 IN | HEART RATE: 75 BPM | BODY MASS INDEX: 31.22 KG/M2 | DIASTOLIC BLOOD PRESSURE: 84 MMHG | WEIGHT: 223 LBS | OXYGEN SATURATION: 98 %

## 2019-02-18 DIAGNOSIS — R41.3 MEMORY LOSS: ICD-10-CM

## 2019-02-18 DIAGNOSIS — F33.1 MODERATE EPISODE OF RECURRENT MAJOR DEPRESSIVE DISORDER (HCC): ICD-10-CM

## 2019-02-18 DIAGNOSIS — I10 ESSENTIAL HYPERTENSION: ICD-10-CM

## 2019-02-18 DIAGNOSIS — H93.13 TINNITUS OF BOTH EARS: Primary | ICD-10-CM

## 2019-02-18 PROCEDURE — G8427 DOCREV CUR MEDS BY ELIG CLIN: HCPCS | Performed by: FAMILY MEDICINE

## 2019-02-18 PROCEDURE — G8417 CALC BMI ABV UP PARAM F/U: HCPCS | Performed by: FAMILY MEDICINE

## 2019-02-18 PROCEDURE — 4040F PNEUMOC VAC/ADMIN/RCVD: CPT | Performed by: FAMILY MEDICINE

## 2019-02-18 PROCEDURE — 1101F PT FALLS ASSESS-DOCD LE1/YR: CPT | Performed by: FAMILY MEDICINE

## 2019-02-18 PROCEDURE — G8510 SCR DEP NEG, NO PLAN REQD: HCPCS | Performed by: FAMILY MEDICINE

## 2019-02-18 PROCEDURE — G8482 FLU IMMUNIZE ORDER/ADMIN: HCPCS | Performed by: FAMILY MEDICINE

## 2019-02-18 PROCEDURE — 1123F ACP DISCUSS/DSCN MKR DOCD: CPT | Performed by: FAMILY MEDICINE

## 2019-02-18 PROCEDURE — 1036F TOBACCO NON-USER: CPT | Performed by: FAMILY MEDICINE

## 2019-02-18 PROCEDURE — 3017F COLORECTAL CA SCREEN DOC REV: CPT | Performed by: FAMILY MEDICINE

## 2019-02-18 PROCEDURE — 99214 OFFICE O/P EST MOD 30 MIN: CPT | Performed by: FAMILY MEDICINE

## 2019-02-18 RX ORDER — FLUOXETINE HYDROCHLORIDE 20 MG/1
20 CAPSULE ORAL DAILY
Qty: 90 CAPSULE | Refills: 3 | Status: SHIPPED | OUTPATIENT
Start: 2019-02-18 | End: 2019-08-19

## 2019-02-18 RX ORDER — AMLODIPINE BESYLATE 5 MG/1
5 TABLET ORAL DAILY
Qty: 90 TABLET | Refills: 6 | Status: SHIPPED
Start: 2019-02-18 | End: 2020-02-22 | Stop reason: SDUPTHER

## 2019-02-18 ASSESSMENT — PATIENT HEALTH QUESTIONNAIRE - PHQ9
SUM OF ALL RESPONSES TO PHQ9 QUESTIONS 1 & 2: 0
SUM OF ALL RESPONSES TO PHQ QUESTIONS 1-9: 0
SUM OF ALL RESPONSES TO PHQ QUESTIONS 1-9: 0
2. FEELING DOWN, DEPRESSED OR HOPELESS: 0
1. LITTLE INTEREST OR PLEASURE IN DOING THINGS: 0

## 2019-02-18 ASSESSMENT — ENCOUNTER SYMPTOMS
COUGH: 0
WHEEZING: 0
SHORTNESS OF BREATH: 0

## 2019-02-25 NOTE — TELEPHONE ENCOUNTER
Patient with anemia - will refer to GI for colonoscopy and/or EGD   Patient agreeable with plan     Would also like to come in to discuss mood. Aleida An please make him an appointment in two weeks. Was the patient seen in the last year in this department? Yes    Does patient have an active prescription for medications requested? No     Received Request Via: Pharmacy

## 2019-03-12 ENCOUNTER — TELEPHONE (OUTPATIENT)
Dept: FAMILY MEDICINE CLINIC | Age: 69
End: 2019-03-12

## 2019-04-03 ENCOUNTER — HOSPITAL ENCOUNTER (OUTPATIENT)
Age: 69
Discharge: HOME | End: 2019-04-05
Payer: MEDICARE

## 2019-04-03 ENCOUNTER — OFFICE VISIT (OUTPATIENT)
Dept: FAMILY MEDICINE CLINIC | Age: 69
End: 2019-04-03
Payer: MEDICARE

## 2019-04-03 VITALS
SYSTOLIC BLOOD PRESSURE: 124 MMHG | DIASTOLIC BLOOD PRESSURE: 70 MMHG | HEART RATE: 83 BPM | BODY MASS INDEX: 30.82 KG/M2 | OXYGEN SATURATION: 96 % | WEIGHT: 221 LBS | RESPIRATION RATE: 16 BRPM

## 2019-04-03 DIAGNOSIS — H93.19 TINNITUS, UNSPECIFIED LATERALITY: ICD-10-CM

## 2019-04-03 DIAGNOSIS — F33.1 MODERATE EPISODE OF RECURRENT MAJOR DEPRESSIVE DISORDER (HCC): ICD-10-CM

## 2019-04-03 DIAGNOSIS — N39.43 POST-VOID DRIBBLING: Primary | ICD-10-CM

## 2019-04-03 DIAGNOSIS — R41.3 MEMORY LOSS: ICD-10-CM

## 2019-04-03 DIAGNOSIS — N39.43 POST-VOID DRIBBLING: ICD-10-CM

## 2019-04-03 LAB
BILIRUBIN URINE: NEGATIVE
BLOOD, URINE: NEGATIVE
CLARITY: CLEAR
COLOR: YELLOW
GLUCOSE URINE: 500 MG/DL
KETONES, URINE: NEGATIVE MG/DL
LEUKOCYTE ESTERASE, URINE: NEGATIVE
NITRITE, URINE: NEGATIVE
PH UA: 6 (ref 5–9)
PROTEIN UA: NEGATIVE MG/DL
SPECIFIC GRAVITY UA: 1.01 (ref 1–1.03)
UROBILINOGEN, URINE: 0.2 E.U./DL

## 2019-04-03 PROCEDURE — 1123F ACP DISCUSS/DSCN MKR DOCD: CPT | Performed by: FAMILY MEDICINE

## 2019-04-03 PROCEDURE — 87088 URINE BACTERIA CULTURE: CPT

## 2019-04-03 PROCEDURE — 3017F COLORECTAL CA SCREEN DOC REV: CPT | Performed by: FAMILY MEDICINE

## 2019-04-03 PROCEDURE — 99214 OFFICE O/P EST MOD 30 MIN: CPT | Performed by: FAMILY MEDICINE

## 2019-04-03 PROCEDURE — 1036F TOBACCO NON-USER: CPT | Performed by: FAMILY MEDICINE

## 2019-04-03 PROCEDURE — G8427 DOCREV CUR MEDS BY ELIG CLIN: HCPCS | Performed by: FAMILY MEDICINE

## 2019-04-03 PROCEDURE — 36415 COLL VENOUS BLD VENIPUNCTURE: CPT

## 2019-04-03 PROCEDURE — G8417 CALC BMI ABV UP PARAM F/U: HCPCS | Performed by: FAMILY MEDICINE

## 2019-04-03 PROCEDURE — G0103 PSA SCREENING: HCPCS

## 2019-04-03 PROCEDURE — 81003 URINALYSIS AUTO W/O SCOPE: CPT

## 2019-04-03 PROCEDURE — 84153 ASSAY OF PSA TOTAL: CPT

## 2019-04-03 PROCEDURE — 4040F PNEUMOC VAC/ADMIN/RCVD: CPT | Performed by: FAMILY MEDICINE

## 2019-04-03 ASSESSMENT — ENCOUNTER SYMPTOMS
COUGH: 0
SHORTNESS OF BREATH: 0
ABDOMINAL PAIN: 0
WHEEZING: 0
NAUSEA: 0
VOMITING: 0

## 2019-04-03 NOTE — PATIENT INSTRUCTIONS
Get your urine and blood tests today   Make an appointment with  , the urologist.   Continue all medications   Follow up in 4 weeks or sooner as needed

## 2019-04-03 NOTE — PROGRESS NOTES
[address] [facility]  [phone]   Kenn Wong MD     Patient: Carmen Del Rio  YOB: 1950  Visit Date: 4/3/19    Marce Beltran is a 80y.o. year old male here today for   Chief Complaint   Patient presents with    Depression    Memory Loss       HPI  Patient is a 80year old male here for follow up of depression and memory loss. In the last 3 weeks has been having issues with urination. Is concerned that is it from his prostate. Has been having post void dribbling and more frequent urination. In the past has seen Dr. Phil Roy. Denies any burning with urination. Is having some night sweats for the past couple weeks. Denies any significant weight loss . Is currently on 60mg of prozac . Thinks this may be affected his tinnitus. But has been helping memory and mood. Has been sleeping ok. Occasionally takes a sleep aid. Saw a physician in the [address] group. Was given a vitamin. Talked about having a fan while sleeping for background noise and recommended using hearing aids. Review of Systems   Constitutional: Negative for chills and fever. Night sweats   Respiratory: Negative for cough, shortness of breath and wheezing. Cardiovascular: Positive for chest pain (muscular - hurts when twists). Negative for palpitations and leg swelling. Gastrointestinal: Negative for abdominal pain, nausea and vomiting. Genitourinary: Positive for decreased urine volume (mild) and frequency. Negative for difficulty urinating, dysuria and hematuria.        Current Outpatient Medications on File Prior to Visit   Medication Sig Dispense Refill    amLODIPine (NORVASC) 5 MG tablet Take 1 tablet by mouth daily 90 tablet 6    FLUoxetine (PROZAC) 20 MG capsule Take 1 capsule by mouth daily Take with 40mg capsule for total of 60mg 90 capsule 3    FLUoxetine (PROZAC) 40 MG capsule Take 1 capsule by mouth daily 90 capsule 3    Blood Pressure Monitor LARY Use daily to check blood pressure 1 Device 0    atorvastatin (LIPITOR) 20 MG tablet Take 1 tablet by mouth daily 90 tablet 3    Glucose Blood (GLUCOSE METER TEST VI) 1 each by In Vitro route daily      glucose blood VI test strips (ASCENSIA AUTODISC VI;ONE TOUCH ULTRA TEST VI) strip 1 each by In Vitro route daily Pt uses Clear Choice Glucose Meter. 100 each 0    Lancets 30G MISC 1 each by Does not apply route daily Pt uses Clear Choice Glucose Meter. 100 each 0    Calcium Citrate-Vitamin D (CALCET CREAMY BITES) 500-400 MG-UNIT CHEW tablet Take 1 tablet by mouth daily      vitamin D (CHOLECALCIFEROL) 1000 UNIT TABS tablet Take 1,000 Units by mouth daily      vitamin B-12 (CYANOCOBALAMIN) 50 MCG tablet Take 50 mcg by mouth daily      ferrous gluconate (FERGON) 240 (27 Fe) MG tablet Take 240 mg by mouth 3 times daily (with meals)      aspirin 81 MG EC tablet Take 81 mg by mouth daily. No current facility-administered medications on file prior to visit. No Known Allergies    Past medical, surgical, socialand/or family history reviewed, updated as needed, and are non-contributory (unless otherwise stated). Medications, allergies, and problem list also reviewed and updated as needed in patient's record. Wt Readings from Last 3 Encounters:   04/03/19 221 lb (100.2 kg)   02/18/19 223 lb (101.2 kg)   01/07/19 215 lb (97.5 kg)                   /70 (Site: Right Upper Arm, Position: Sitting, Cuff Size: Large Adult)   Pulse 83   Resp 16   Wt 221 lb (100.2 kg)   SpO2 96%   BMI 30.82 kg/m²        Physical Exam   Constitutional: He appears well-developed and well-nourished. No distress. HENT:   Head: Normocephalic and atraumatic. Cardiovascular: Normal rate, regular rhythm, normal heart sounds and intact distal pulses. No murmur heard. Pulmonary/Chest: Effort normal and breath sounds normal. No respiratory distress. He has no wheezes. He has no rales.    Abdominal: Bowel sounds are normal. He 104 100 - 108 mmol/L    CO2 26 22 - 29 mmol/L    POC Anion Gap 10 7 - 16 mmol/L    POC Glucose 102 (H) 74 - 99 mg/dl    POC BUN 19 8 - 23 mg/dL    POC Creatinine 1.0 0.7 - 1.2 mg/dL    GFR Non-African American >60 >=60 mL/min/1.73    GFR  >60        ASSESSMENT/PLAN  Letty Parham was seen today for depression and memory loss. Diagnoses and all orders for this visit:    Post-void dribbling  -     PSA, DIAGNOSTIC; Future  -     Urine Culture; Future  -     Cancel: Urinalysis; Future  -     External Referral To Urology    Memory loss   - Improving   - Had slp cognitive eval - not interested in slp cognitive therapy     Tinnitus, unspecified laterality   - Discussed getting hearing aids     Moderate episode of recurrent major depressive disorder ([facility] [address]   - Improved. Continue current meds. Phone/MyChart follow up if tests abnormal.    Return in about 1 month (around 5/1/2019). or sooner if necessary. I have reviewed myfindings and recommendations with Letty Parham. Antonio Parisi.  Malissa Willams M.D

## 2019-04-05 LAB — URINE CULTURE, ROUTINE: NORMAL

## 2019-04-30 LAB
PROSTATE SPECIFIC ANTIGEN: 1.33 NG/ML (ref 0–4)
PROSTATE SPECIFIC ANTIGEN: ABNORMAL NG/ML (ref 0–4)

## 2019-05-06 ENCOUNTER — OFFICE VISIT (OUTPATIENT)
Dept: FAMILY MEDICINE CLINIC | Age: 69
End: 2019-05-06
Payer: MEDICARE

## 2019-05-06 VITALS
BODY MASS INDEX: 32.22 KG/M2 | OXYGEN SATURATION: 96 % | HEART RATE: 81 BPM | RESPIRATION RATE: 18 BRPM | DIASTOLIC BLOOD PRESSURE: 78 MMHG | SYSTOLIC BLOOD PRESSURE: 132 MMHG | WEIGHT: 231 LBS

## 2019-05-06 DIAGNOSIS — F33.1 MODERATE EPISODE OF RECURRENT MAJOR DEPRESSIVE DISORDER (HCC): ICD-10-CM

## 2019-05-06 DIAGNOSIS — R41.3 MEMORY LOSS: Primary | ICD-10-CM

## 2019-05-06 DIAGNOSIS — N39.43 POST-VOID DRIBBLING: ICD-10-CM

## 2019-05-06 PROCEDURE — 4040F PNEUMOC VAC/ADMIN/RCVD: CPT | Performed by: FAMILY MEDICINE

## 2019-05-06 PROCEDURE — 3017F COLORECTAL CA SCREEN DOC REV: CPT | Performed by: FAMILY MEDICINE

## 2019-05-06 PROCEDURE — 1123F ACP DISCUSS/DSCN MKR DOCD: CPT | Performed by: FAMILY MEDICINE

## 2019-05-06 PROCEDURE — G8417 CALC BMI ABV UP PARAM F/U: HCPCS | Performed by: FAMILY MEDICINE

## 2019-05-06 PROCEDURE — 99213 OFFICE O/P EST LOW 20 MIN: CPT | Performed by: FAMILY MEDICINE

## 2019-05-06 PROCEDURE — 1036F TOBACCO NON-USER: CPT | Performed by: FAMILY MEDICINE

## 2019-05-06 PROCEDURE — G8427 DOCREV CUR MEDS BY ELIG CLIN: HCPCS | Performed by: FAMILY MEDICINE

## 2019-05-06 RX ORDER — TAMSULOSIN HYDROCHLORIDE 0.4 MG/1
CAPSULE ORAL
Refills: 4 | COMMUNITY
Start: 2019-04-11 | End: 2020-11-16 | Stop reason: CLARIF

## 2019-05-06 RX ORDER — DONEPEZIL HYDROCHLORIDE 5 MG/1
5 TABLET, FILM COATED ORAL NIGHTLY
Qty: 30 TABLET | Refills: 5 | Status: SHIPPED | OUTPATIENT
Start: 2019-05-06 | End: 2019-07-08

## 2019-05-06 ASSESSMENT — ENCOUNTER SYMPTOMS
VOMITING: 0
COUGH: 0
NAUSEA: 0
WHEEZING: 0
SHORTNESS OF BREATH: 0
ABDOMINAL PAIN: 0

## 2019-05-06 NOTE — PROGRESS NOTES
[address] [facility]  [phone]   Guido Carcamo MD     Patient: Elena Mitchell  YOB: 1950  Visit Date: 5/6/19    Mary Carmen Honeycutt is a 80y.o. year old male here today for   Chief Complaint   Patient presents with    Depression    Urinary Frequency    Memory Loss       HPI  Patient is a 80year old male here for follow up of urinary issues, memory issues, and depression. Depression has been improved on the 60mg of prozac. Gets restless and frustrated. Memory is the same but gets frustrated with this. Long term memory intact. Short term memory. Saw the urologist for his urinary issues and was started on flomax and this seems to be helping. Review of Systems   Constitutional: Negative for chills and fever. Respiratory: Negative for cough, shortness of breath and wheezing. Cardiovascular: Negative for chest pain, palpitations and leg swelling. Gastrointestinal: Negative for abdominal pain, nausea and vomiting. Genitourinary: Negative for difficulty urinating, dysuria, frequency and hematuria.        Current Outpatient Medications on File Prior to Visit   Medication Sig Dispense Refill    tamsulosin (FLOMAX) 0.4 MG capsule TAKE ONE CAPSULE BY MOUTH DAILY 30 MINUTES AFTER EVENING MEAL  4    amLODIPine (NORVASC) 5 MG tablet Take 1 tablet by mouth daily 90 tablet 6    FLUoxetine (PROZAC) 20 MG capsule Take 1 capsule by mouth daily Take with 40mg capsule for total of 60mg 90 capsule 3    FLUoxetine (PROZAC) 40 MG capsule Take 1 capsule by mouth daily 90 capsule 3    Blood Pressure Monitor LARY Use daily to check blood pressure 1 Device 0    atorvastatin (LIPITOR) 20 MG tablet Take 1 tablet by mouth daily 90 tablet 3    Glucose Blood (GLUCOSE METER TEST VI) 1 each by In Vitro route daily      glucose blood VI test strips (ASCENSIA AUTODISC VI;ONE TOUCH ULTRA TEST VI) strip 1 each by In Vitro route daily Pt uses Clear Choice Glucose Meter. 100 each 0    Lancets 30G MISC 1 each by Does not apply route daily Pt uses Clear Choice Glucose Meter. 100 each 0    Calcium Citrate-Vitamin D (CALCET CREAMY BITES) 500-400 MG-UNIT CHEW tablet Take 1 tablet by mouth daily      vitamin D (CHOLECALCIFEROL) 1000 UNIT TABS tablet Take 1,000 Units by mouth daily      vitamin B-12 (CYANOCOBALAMIN) 50 MCG tablet Take 50 mcg by mouth daily      ferrous gluconate (FERGON) 240 (27 Fe) MG tablet Take 240 mg by mouth 3 times daily (with meals)      aspirin 81 MG EC tablet Take 81 mg by mouth daily. No current facility-administered medications on file prior to visit. No Known Allergies    Past medical, surgical, socialand/or family history reviewed, updated as needed, and are non-contributory (unless otherwise stated). Medications, allergies, and problem list also reviewed and updated as needed in patient's record. Wt Readings from Last 3 Encounters:   05/06/19 231 lb (104.8 kg)   04/03/19 221 lb (100.2 kg)   02/18/19 223 lb (101.2 kg)                   /78 (Site: Right Upper Arm, Position: Sitting, Cuff Size: Large Adult)   Pulse 81   Resp 18   Wt 231 lb (104.8 kg)   SpO2 96%   BMI 32.22 kg/m²        Physical Exam   Constitutional: He appears well-developed and well-nourished. No distress. HENT:   Head: Normocephalic and atraumatic. Cardiovascular: Normal rate, regular rhythm, normal heart sounds and intact distal pulses. No murmur heard. Pulmonary/Chest: Effort normal and breath sounds normal. No respiratory distress. He has no wheezes. He has no rales. Abdominal: Bowel sounds are normal. He exhibits no distension. There is no tenderness. There is no rebound and no guarding. Musculoskeletal: Normal range of motion. He exhibits no edema. Skin: He is not diaphoretic. Nursing note and vitals reviewed. ASSESSMENT/PLAN  Chacorta Lim was seen today for depression, urinary frequency and memory loss.     Diagnoses and all orders for this visit:    Memory loss   - Will trial aricept     Post-void dribbling   - Improved with flomax    Moderate episode of recurrent major depressive disorder ([facility] [address]   - Improved with prozac         Phone/MyChart follow up if tests abnormal.    Return in about 2 months (around 7/6/2019) for memory loss . or sooner if necessary. I have reviewed myfindings and recommendations with Bladimir Donis. Jean-Pierre Nino.  Nica Hutchins M.D

## 2019-05-14 DIAGNOSIS — E11.9 TYPE 2 DIABETES MELLITUS WITHOUT COMPLICATION, WITHOUT LONG-TERM CURRENT USE OF INSULIN (HCC): ICD-10-CM

## 2019-05-15 ENCOUNTER — TELEPHONE (OUTPATIENT)
Dept: FAMILY MEDICINE CLINIC | Age: 69
End: 2019-05-15

## 2019-05-16 RX ORDER — ATORVASTATIN CALCIUM 20 MG/1
20 TABLET, FILM COATED ORAL DAILY
Qty: 90 TABLET | Refills: 3 | Status: SHIPPED
Start: 2019-05-16 | End: 2020-06-11

## 2019-07-08 ENCOUNTER — OFFICE VISIT (OUTPATIENT)
Dept: FAMILY MEDICINE CLINIC | Age: 69
End: 2019-07-08
Payer: MEDICARE

## 2019-07-08 ENCOUNTER — HOSPITAL ENCOUNTER (OUTPATIENT)
Age: 69
Discharge: HOME | End: 2019-07-10
Payer: MEDICARE

## 2019-07-08 VITALS
WEIGHT: 240 LBS | BODY MASS INDEX: 33.6 KG/M2 | RESPIRATION RATE: 16 BRPM | DIASTOLIC BLOOD PRESSURE: 72 MMHG | HEART RATE: 96 BPM | OXYGEN SATURATION: 96 % | SYSTOLIC BLOOD PRESSURE: 130 MMHG | HEIGHT: 71 IN

## 2019-07-08 DIAGNOSIS — R42 LIGHTHEADEDNESS: ICD-10-CM

## 2019-07-08 DIAGNOSIS — R07.9 CHEST PAIN, UNSPECIFIED TYPE: Primary | ICD-10-CM

## 2019-07-08 DIAGNOSIS — R41.3 MEMORY LOSS: ICD-10-CM

## 2019-07-08 DIAGNOSIS — R06.02 SHORTNESS OF BREATH: ICD-10-CM

## 2019-07-08 LAB
ANION GAP SERPL CALCULATED.3IONS-SCNC: 13 MMOL/L (ref 7–16)
BUN BLDV-MCNC: 28 MG/DL (ref 8–23)
CALCIUM SERPL-MCNC: 10 MG/DL (ref 8.6–10.2)
CHLORIDE BLD-SCNC: 102 MMOL/L (ref 98–107)
CO2: 26 MMOL/L (ref 22–29)
CREAT SERPL-MCNC: 1.3 MG/DL (ref 0.7–1.2)
GFR AFRICAN AMERICAN: >60
GFR NON-AFRICAN AMERICAN: 55 ML/MIN/1.73
GLUCOSE BLD-MCNC: 108 MG/DL (ref 74–99)
HCT VFR BLD CALC: 43.7 % (ref 37–54)
HEMOGLOBIN: 13.8 G/DL (ref 12.5–16.5)
MCH RBC QN AUTO: 29.7 PG (ref 26–35)
MCHC RBC AUTO-ENTMCNC: 31.6 % (ref 32–34.5)
MCV RBC AUTO: 94.2 FL (ref 80–99.9)
PDW BLD-RTO: 14.6 FL (ref 11.5–15)
PLATELET # BLD: 309 E9/L (ref 130–450)
PMV BLD AUTO: 12.5 FL (ref 7–12)
POTASSIUM SERPL-SCNC: 5.4 MMOL/L (ref 3.5–5)
RBC # BLD: 4.64 E12/L (ref 3.8–5.8)
SODIUM BLD-SCNC: 141 MMOL/L (ref 132–146)
WBC # BLD: 8.8 E9/L (ref 4.5–11.5)

## 2019-07-08 PROCEDURE — 99213 OFFICE O/P EST LOW 20 MIN: CPT | Performed by: FAMILY MEDICINE

## 2019-07-08 PROCEDURE — 3017F COLORECTAL CA SCREEN DOC REV: CPT | Performed by: FAMILY MEDICINE

## 2019-07-08 PROCEDURE — G8417 CALC BMI ABV UP PARAM F/U: HCPCS | Performed by: FAMILY MEDICINE

## 2019-07-08 PROCEDURE — 1036F TOBACCO NON-USER: CPT | Performed by: FAMILY MEDICINE

## 2019-07-08 PROCEDURE — 1123F ACP DISCUSS/DSCN MKR DOCD: CPT | Performed by: FAMILY MEDICINE

## 2019-07-08 PROCEDURE — 4040F PNEUMOC VAC/ADMIN/RCVD: CPT | Performed by: FAMILY MEDICINE

## 2019-07-08 PROCEDURE — 93000 ELECTROCARDIOGRAM COMPLETE: CPT | Performed by: FAMILY MEDICINE

## 2019-07-08 PROCEDURE — 80048 BASIC METABOLIC PNL TOTAL CA: CPT

## 2019-07-08 PROCEDURE — 85027 COMPLETE CBC AUTOMATED: CPT

## 2019-07-08 PROCEDURE — 36415 COLL VENOUS BLD VENIPUNCTURE: CPT

## 2019-07-08 PROCEDURE — G8427 DOCREV CUR MEDS BY ELIG CLIN: HCPCS | Performed by: FAMILY MEDICINE

## 2019-07-08 ASSESSMENT — ENCOUNTER SYMPTOMS
NAUSEA: 0
SHORTNESS OF BREATH: 1
WHEEZING: 0
COUGH: 0
VOMITING: 0
ABDOMINAL PAIN: 0

## 2019-07-08 NOTE — PROGRESS NOTES
guarding. Musculoskeletal: Normal range of motion. He exhibits no edema. Skin: He is not diaphoretic. Nursing note and vitals reviewed. Results for orders placed or performed during the hospital encounter of 04/03/19   Urine Culture   Result Value Ref Range    Urine Culture, Routine Growth not present    Urinalysis   Result Value Ref Range    Color, UA Yellow Straw/Yellow    Clarity, UA Clear Clear    Glucose, Ur 500 (A) Negative mg/dL    Bilirubin Urine Negative Negative    Ketones, Urine Negative Negative mg/dL    Specific Gravity, UA 1.010 1.005 - 1.030    Blood, Urine Negative Negative    pH, UA 6.0 5.0 - 9.0    Protein, UA Negative Negative mg/dL    Urobilinogen, Urine 0.2 <2.0 E.U./dL    Nitrite, Urine Negative Negative    Leukocyte Esterase, Urine Negative Negative   PSA, DIAGNOSTIC   Result Value Ref Range    PSA 1.33 0.00 - 4.00 ng/mL   Psa screening   Result Value Ref Range    PSA SEE NOTE (AA) 0.00 - 4.00 ng/mL       ASSESSMENT/PLAN  Ted Clark was seen today for memory loss. Diagnoses and all orders for this visit:    Chest pain, unspecified type  -     EKG 12 Lead  -     XR CHEST STANDARD (2 VW); Future  - EKG wnl    Check xray to rule out pneumonia     Shortness of breath  -     XR CHEST STANDARD (2 VW); Future    Memory loss  -     External Referral To Neurology    Lightheadedness  -     Basic Metabolic Panel; Future  -     CBC; Future        Close follow up to ensure improvement. Phone/MyChart follow up if tests abnormal.    Return in about 9 days (around 7/17/2019) for lightheadness . or sooner if necessary. I have reviewed myfindings and recommendations with Ted Clark. Broderick Welch, M.D

## 2019-07-10 DIAGNOSIS — E87.5 HYPERKALEMIA: Primary | ICD-10-CM

## 2019-07-11 ENCOUNTER — HOSPITAL ENCOUNTER (OUTPATIENT)
Age: 69
Discharge: HOME | End: 2019-07-13
Payer: MEDICARE

## 2019-07-11 LAB
ANION GAP SERPL CALCULATED.3IONS-SCNC: 14 MMOL/L (ref 7–16)
BUN BLDV-MCNC: 25 MG/DL (ref 8–23)
CALCIUM SERPL-MCNC: 9.2 MG/DL (ref 8.6–10.2)
CHLORIDE BLD-SCNC: 106 MMOL/L (ref 98–107)
CO2: 23 MMOL/L (ref 22–29)
CREAT SERPL-MCNC: 1 MG/DL (ref 0.7–1.2)
GFR AFRICAN AMERICAN: >60
GFR NON-AFRICAN AMERICAN: >60 ML/MIN/1.73
GLUCOSE BLD-MCNC: 130 MG/DL (ref 74–99)
POTASSIUM SERPL-SCNC: 4.9 MMOL/L (ref 3.5–5)
SODIUM BLD-SCNC: 143 MMOL/L (ref 132–146)

## 2019-07-11 PROCEDURE — 36415 COLL VENOUS BLD VENIPUNCTURE: CPT

## 2019-07-11 PROCEDURE — 80048 BASIC METABOLIC PNL TOTAL CA: CPT

## 2019-07-19 ENCOUNTER — TELEPHONE (OUTPATIENT)
Dept: FAMILY MEDICINE CLINIC | Age: 69
End: 2019-07-19

## 2019-07-19 NOTE — TELEPHONE ENCOUNTER
----- Message from Angelita Sams MD sent at 7/16/2019  4:07 PM EDT -----  Please let patient know potassium has come back down to normal

## 2019-07-24 ENCOUNTER — OFFICE VISIT (OUTPATIENT)
Dept: FAMILY MEDICINE CLINIC | Age: 69
End: 2019-07-24
Payer: MEDICARE

## 2019-07-24 VITALS
WEIGHT: 241 LBS | OXYGEN SATURATION: 98 % | SYSTOLIC BLOOD PRESSURE: 138 MMHG | RESPIRATION RATE: 16 BRPM | HEART RATE: 70 BPM | BODY MASS INDEX: 33.61 KG/M2 | DIASTOLIC BLOOD PRESSURE: 82 MMHG

## 2019-07-24 DIAGNOSIS — R42 LIGHTHEADEDNESS: Primary | ICD-10-CM

## 2019-07-24 DIAGNOSIS — R41.3 MEMORY LOSS: ICD-10-CM

## 2019-07-24 DIAGNOSIS — R06.02 SHORTNESS OF BREATH: ICD-10-CM

## 2019-07-24 DIAGNOSIS — R07.9 CHEST PAIN, UNSPECIFIED TYPE: ICD-10-CM

## 2019-07-24 PROCEDURE — 3017F COLORECTAL CA SCREEN DOC REV: CPT | Performed by: FAMILY MEDICINE

## 2019-07-24 PROCEDURE — 1123F ACP DISCUSS/DSCN MKR DOCD: CPT | Performed by: FAMILY MEDICINE

## 2019-07-24 PROCEDURE — G8427 DOCREV CUR MEDS BY ELIG CLIN: HCPCS | Performed by: FAMILY MEDICINE

## 2019-07-24 PROCEDURE — 1036F TOBACCO NON-USER: CPT | Performed by: FAMILY MEDICINE

## 2019-07-24 PROCEDURE — 99213 OFFICE O/P EST LOW 20 MIN: CPT | Performed by: FAMILY MEDICINE

## 2019-07-24 PROCEDURE — G8417 CALC BMI ABV UP PARAM F/U: HCPCS | Performed by: FAMILY MEDICINE

## 2019-07-24 PROCEDURE — 4040F PNEUMOC VAC/ADMIN/RCVD: CPT | Performed by: FAMILY MEDICINE

## 2019-07-24 RX ORDER — DONEPEZIL HYDROCHLORIDE 5 MG/1
TABLET, FILM COATED ORAL
Refills: 5 | COMMUNITY
Start: 2019-07-10 | End: 2019-08-19

## 2019-07-24 ASSESSMENT — ENCOUNTER SYMPTOMS
COUGH: 0
WHEEZING: 0
SHORTNESS OF BREATH: 1
ABDOMINAL PAIN: 0
VOMITING: 0
NAUSEA: 0

## 2019-07-24 NOTE — PROGRESS NOTES
3    Blood Pressure Monitor LARY Use daily to check blood pressure 1 Device 0    Glucose Blood (GLUCOSE METER TEST VI) 1 each by In Vitro route daily      glucose blood VI test strips (ASCENSIA AUTODISC VI;ONE TOUCH ULTRA TEST VI) strip 1 each by In Vitro route daily Pt uses Clear Choice Glucose Meter. 100 each 0    Lancets 30G MISC 1 each by Does not apply route daily Pt uses Clear Choice Glucose Meter. 100 each 0    Calcium Citrate-Vitamin D (CALCET CREAMY BITES) 500-400 MG-UNIT CHEW tablet Take 1 tablet by mouth daily      vitamin D (CHOLECALCIFEROL) 1000 UNIT TABS tablet Take 1,000 Units by mouth daily      vitamin B-12 (CYANOCOBALAMIN) 50 MCG tablet Take 50 mcg by mouth daily      ferrous gluconate (FERGON) 240 (27 Fe) MG tablet Take 240 mg by mouth 3 times daily (with meals)      aspirin 81 MG EC tablet Take 81 mg by mouth daily. No current facility-administered medications on file prior to visit. No Known Allergies    Past medical, surgical, socialand/or family history reviewed, updated as needed, and are non-contributory (unless otherwise stated). Medications, allergies, and problem list also reviewed and updated as needed in patient's record. Wt Readings from Last 3 Encounters:   07/24/19 241 lb (109.3 kg)   07/08/19 240 lb (108.9 kg)   05/06/19 231 lb (104.8 kg)                   /82   Pulse 70   Resp 16   Wt 241 lb (109.3 kg)   SpO2 98%   BMI 33.61 kg/m²        Physical Exam   Constitutional: He appears well-developed and well-nourished. No distress. HENT:   Head: Normocephalic and atraumatic. Cardiovascular: Normal rate, regular rhythm, normal heart sounds and intact distal pulses. No murmur heard. Pulmonary/Chest: Effort normal and breath sounds normal. No respiratory distress. He has no wheezes. He has no rales. Abdominal: Bowel sounds are normal. He exhibits no distension. There is no tenderness. There is no rebound and no guarding.    Musculoskeletal: Normal range of motion. He exhibits no edema. Skin: He is not diaphoretic. Nursing note and vitals reviewed. Results for orders placed or performed during the hospital encounter of 14/77/46   Basic Metabolic Panel   Result Value Ref Range    Sodium 143 132 - 146 mmol/L    Potassium 4.9 3.5 - 5.0 mmol/L    Chloride 106 98 - 107 mmol/L    CO2 23 22 - 29 mmol/L    Anion Gap 14 7 - 16 mmol/L    Glucose 130 (H) 74 - 99 mg/dL    BUN 25 (H) 8 - 23 mg/dL    CREATININE 1.0 0.7 - 1.2 mg/dL    GFR Non-African American >60 >=60 mL/min/1.73    GFR African American >60     Calcium 9.2 8.6 - 10.2 mg/dL       ASSESSMENT/PLAN  Lissette Kramer was seen today for dizziness. Diagnoses and all orders for this visit:    Irwin Juan MD, Cardiology, [facility]  - Concern for med effect vs cardiac issue will send for stress test and further cardiac eval.     Memory loss   - Will stop aricept   Shortness of breath  -     Daria Shaw MD, Cardiology, [facility]    Chest pain, unspecified type  -     Daria Shaw MD, Cardiology, [facility]            Phone/MyChart follow up if tests abnormal.    Return in about 5 weeks (around 8/28/2019) for lightheadedness. . or sooner if necessary. I have reviewed myfindings and recommendations with Lissette Kramer. Hollie Tran.  Alyssa New M.D

## 2019-08-19 ENCOUNTER — OFFICE VISIT (OUTPATIENT)
Dept: CARDIOLOGY CLINIC | Age: 69
End: 2019-08-19
Payer: MEDICARE

## 2019-08-19 VITALS
HEIGHT: 71 IN | BODY MASS INDEX: 34.02 KG/M2 | HEART RATE: 84 BPM | WEIGHT: 243 LBS | DIASTOLIC BLOOD PRESSURE: 60 MMHG | SYSTOLIC BLOOD PRESSURE: 120 MMHG

## 2019-08-19 DIAGNOSIS — E78.00 HYPERCHOLESTEREMIA: ICD-10-CM

## 2019-08-19 DIAGNOSIS — E66.8 MODERATE OBESITY: ICD-10-CM

## 2019-08-19 DIAGNOSIS — F09 COGNITIVE DYSFUNCTION: ICD-10-CM

## 2019-08-19 DIAGNOSIS — R06.09 EXERTIONAL DYSPNEA: Primary | ICD-10-CM

## 2019-08-19 DIAGNOSIS — I67.82 CEREBRAL ISCHEMIA: ICD-10-CM

## 2019-08-19 DIAGNOSIS — I10 ESSENTIAL HYPERTENSION: ICD-10-CM

## 2019-08-19 PROCEDURE — 1123F ACP DISCUSS/DSCN MKR DOCD: CPT | Performed by: INTERNAL MEDICINE

## 2019-08-19 PROCEDURE — 3017F COLORECTAL CA SCREEN DOC REV: CPT | Performed by: INTERNAL MEDICINE

## 2019-08-19 PROCEDURE — 1036F TOBACCO NON-USER: CPT | Performed by: INTERNAL MEDICINE

## 2019-08-19 PROCEDURE — 99204 OFFICE O/P NEW MOD 45 MIN: CPT | Performed by: INTERNAL MEDICINE

## 2019-08-19 PROCEDURE — 4040F PNEUMOC VAC/ADMIN/RCVD: CPT | Performed by: INTERNAL MEDICINE

## 2019-08-19 PROCEDURE — G8417 CALC BMI ABV UP PARAM F/U: HCPCS | Performed by: INTERNAL MEDICINE

## 2019-08-19 PROCEDURE — 93000 ELECTROCARDIOGRAM COMPLETE: CPT | Performed by: INTERNAL MEDICINE

## 2019-08-19 PROCEDURE — G8427 DOCREV CUR MEDS BY ELIG CLIN: HCPCS | Performed by: INTERNAL MEDICINE

## 2019-08-19 NOTE — PROGRESS NOTES
Laterality Date    ANTERIOR CRUCIATE LIGAMENT REPAIR  2002 left knee    INGUINAL HERNIA REPAIR  1991 & [facility] JOINT REPLACEMENT  2011 right    right knee    PENILE PROSTHESIS      CHET-EN-Y GASTRIC BYPASS  2004 Dr. Estrella Ochoa  2004 Dr. Linh Castro  12/15/2004 Dr. Hayley Belle  2007 Laparoscopic with mesh, Dr Bernarda Reed       Family History:  Family History   Problem Relation Age of Onset    Heart Disease Mother     Heart Disease Father     High Blood Pressure Father     High Blood Pressure Sister     Breast Cancer Sister     High Blood Pressure Brother     Heart Disease Brother        Social History:  Social History     Socioeconomic History    Marital status:      Spouse name: Not on file    Number of children: Not on file    Years of education: Not on file    Highest education level: Not on file   Occupational History    Not on file   Social Needs    Financial resource strain: Not on file    Food insecurity:     Worry: Not on file     Inability: Not on file    Transportation needs:     Medical: Not on file     Non-medical: Not on file   Tobacco Use    Smoking status: Former Smoker     Packs/day: 1.00     Years: 5.00     Pack years: 5.00     Types: Cigarettes     Last attempt to quit: 1970     Years since quittin.6    Smokeless tobacco: Never Used   Substance and Sexual Activity    Alcohol use: No     Comment: was an alcohol until .  Went to [place] [id number] then had went to a doctor in [facility], biochemical    2 CUPS OF COFFEE A DAY     Drug use: No    Sexual activity: Not Currently     Partners: Female   Lifestyle    Physical activity:     Days per week: Not on file     Minutes per session: Not on file    Stress: Not on file   Relationships    Social connections:     Talks on phone: Not on file     Gets together: Not on file     Attends Mandaeism service: Not on file

## 2019-08-28 ENCOUNTER — OFFICE VISIT (OUTPATIENT)
Dept: FAMILY MEDICINE CLINIC | Age: 69
End: 2019-08-28
Payer: MEDICARE

## 2019-08-28 VITALS
RESPIRATION RATE: 16 BRPM | BODY MASS INDEX: 34.58 KG/M2 | HEART RATE: 71 BPM | HEIGHT: 71 IN | WEIGHT: 247 LBS | SYSTOLIC BLOOD PRESSURE: 138 MMHG | TEMPERATURE: 97 F | OXYGEN SATURATION: 93 % | DIASTOLIC BLOOD PRESSURE: 88 MMHG

## 2019-08-28 DIAGNOSIS — R42 LIGHTHEADEDNESS: Primary | ICD-10-CM

## 2019-08-28 PROCEDURE — 3017F COLORECTAL CA SCREEN DOC REV: CPT | Performed by: FAMILY MEDICINE

## 2019-08-28 PROCEDURE — G8427 DOCREV CUR MEDS BY ELIG CLIN: HCPCS | Performed by: FAMILY MEDICINE

## 2019-08-28 PROCEDURE — 4040F PNEUMOC VAC/ADMIN/RCVD: CPT | Performed by: FAMILY MEDICINE

## 2019-08-28 PROCEDURE — 99213 OFFICE O/P EST LOW 20 MIN: CPT | Performed by: FAMILY MEDICINE

## 2019-08-28 PROCEDURE — 1036F TOBACCO NON-USER: CPT | Performed by: FAMILY MEDICINE

## 2019-08-28 PROCEDURE — G8417 CALC BMI ABV UP PARAM F/U: HCPCS | Performed by: FAMILY MEDICINE

## 2019-08-28 PROCEDURE — 1123F ACP DISCUSS/DSCN MKR DOCD: CPT | Performed by: FAMILY MEDICINE

## 2019-08-28 ASSESSMENT — ENCOUNTER SYMPTOMS
WHEEZING: 0
VOMITING: 0
ABDOMINAL PAIN: 0
NAUSEA: 0
SHORTNESS OF BREATH: 0
COUGH: 0

## 2019-08-28 NOTE — PATIENT INSTRUCTIONS
Restart aricept at bedtime  Cut the amlodipine in half   Get echo done   Follow up in 6 weeks or sooner as needed.

## 2019-08-30 ENCOUNTER — HOSPITAL ENCOUNTER (OUTPATIENT)
Dept: CARDIOLOGY | Age: 69
Discharge: HOME | End: 2019-08-30
Payer: MEDICARE

## 2019-08-30 ENCOUNTER — TELEPHONE (OUTPATIENT)
Dept: CARDIOLOGY CLINIC | Age: 69
End: 2019-08-30

## 2019-08-30 DIAGNOSIS — I10 ESSENTIAL HYPERTENSION: ICD-10-CM

## 2019-08-30 DIAGNOSIS — R06.09 EXERTIONAL DYSPNEA: ICD-10-CM

## 2019-08-30 LAB
LV EF: 65 %
LVEF MODALITY: NORMAL

## 2019-08-30 PROCEDURE — 93306 TTE W/DOPPLER COMPLETE: CPT

## 2019-09-20 ENCOUNTER — TELEPHONE (OUTPATIENT)
Dept: FAMILY MEDICINE CLINIC | Age: 69
End: 2019-09-20

## 2019-09-20 DIAGNOSIS — M54.2 NECK PAIN: ICD-10-CM

## 2019-09-20 DIAGNOSIS — M54.5 CHRONIC LOW BACK PAIN, UNSPECIFIED BACK PAIN LATERALITY, WITH SCIATICA PRESENCE UNSPECIFIED: ICD-10-CM

## 2019-09-20 DIAGNOSIS — G89.29 CHRONIC LOW BACK PAIN, UNSPECIFIED BACK PAIN LATERALITY, WITH SCIATICA PRESENCE UNSPECIFIED: ICD-10-CM

## 2019-09-20 DIAGNOSIS — R42 LIGHTHEADEDNESS: Primary | ICD-10-CM

## 2019-10-09 ENCOUNTER — OFFICE VISIT (OUTPATIENT)
Dept: FAMILY MEDICINE CLINIC | Age: 69
End: 2019-10-09
Payer: MEDICARE

## 2019-10-09 VITALS
SYSTOLIC BLOOD PRESSURE: 134 MMHG | HEART RATE: 83 BPM | TEMPERATURE: 97.3 F | HEIGHT: 71 IN | WEIGHT: 253 LBS | OXYGEN SATURATION: 97 % | BODY MASS INDEX: 35.42 KG/M2 | DIASTOLIC BLOOD PRESSURE: 86 MMHG

## 2019-10-09 DIAGNOSIS — G47.33 OBSTRUCTIVE SLEEP APNEA: ICD-10-CM

## 2019-10-09 DIAGNOSIS — Z23 NEED FOR PNEUMOCOCCAL VACCINATION: ICD-10-CM

## 2019-10-09 DIAGNOSIS — Z00.00 ROUTINE GENERAL MEDICAL EXAMINATION AT A HEALTH CARE FACILITY: Primary | ICD-10-CM

## 2019-10-09 DIAGNOSIS — R06.83 SNORING: ICD-10-CM

## 2019-10-09 PROCEDURE — G0009 ADMIN PNEUMOCOCCAL VACCINE: HCPCS | Performed by: FAMILY MEDICINE

## 2019-10-09 PROCEDURE — G8482 FLU IMMUNIZE ORDER/ADMIN: HCPCS | Performed by: FAMILY MEDICINE

## 2019-10-09 PROCEDURE — 90670 PCV13 VACCINE IM: CPT | Performed by: FAMILY MEDICINE

## 2019-10-09 PROCEDURE — 1123F ACP DISCUSS/DSCN MKR DOCD: CPT | Performed by: FAMILY MEDICINE

## 2019-10-09 PROCEDURE — G0439 PPPS, SUBSEQ VISIT: HCPCS | Performed by: FAMILY MEDICINE

## 2019-10-09 PROCEDURE — 4040F PNEUMOC VAC/ADMIN/RCVD: CPT | Performed by: FAMILY MEDICINE

## 2019-10-09 PROCEDURE — 3017F COLORECTAL CA SCREEN DOC REV: CPT | Performed by: FAMILY MEDICINE

## 2019-10-09 RX ORDER — DONEPEZIL HYDROCHLORIDE 5 MG/1
5 TABLET, FILM COATED ORAL NIGHTLY
Qty: 30 TABLET | Refills: 5 | Status: SHIPPED
Start: 2019-10-09 | End: 2020-04-29 | Stop reason: SDUPTHER

## 2019-10-09 ASSESSMENT — PATIENT HEALTH QUESTIONNAIRE - PHQ9
SUM OF ALL RESPONSES TO PHQ QUESTIONS 1-9: 0
1. LITTLE INTEREST OR PLEASURE IN DOING THINGS: 0
SUM OF ALL RESPONSES TO PHQ9 QUESTIONS 1 & 2: 0
SUM OF ALL RESPONSES TO PHQ QUESTIONS 1-9: 0
2. FEELING DOWN, DEPRESSED OR HOPELESS: 0

## 2019-10-09 ASSESSMENT — LIFESTYLE VARIABLES: HOW OFTEN DO YOU HAVE A DRINK CONTAINING ALCOHOL: 0

## 2019-11-03 ENCOUNTER — HOSPITAL ENCOUNTER (OUTPATIENT)
Dept: SLEEP CENTER | Age: 69
Discharge: HOME | End: 2019-11-03
Payer: MEDICARE

## 2019-11-03 DIAGNOSIS — G47.33 OBSTRUCTIVE SLEEP APNEA: ICD-10-CM

## 2019-11-03 DIAGNOSIS — R06.83 SNORING: ICD-10-CM

## 2019-11-03 PROCEDURE — 95810 POLYSOM 6/> YRS 4/> PARAM: CPT

## 2019-11-03 PROCEDURE — 95810 POLYSOM 6/> YRS 4/> PARAM: CPT | Performed by: INTERNAL MEDICINE

## 2019-12-02 ENCOUNTER — TELEPHONE (OUTPATIENT)
Dept: FAMILY MEDICINE CLINIC | Age: 69
End: 2019-12-02

## 2019-12-02 DIAGNOSIS — G89.29 CHRONIC LOW BACK PAIN, UNSPECIFIED BACK PAIN LATERALITY, UNSPECIFIED WHETHER SCIATICA PRESENT: Primary | ICD-10-CM

## 2019-12-02 DIAGNOSIS — M54.50 CHRONIC LOW BACK PAIN, UNSPECIFIED BACK PAIN LATERALITY, UNSPECIFIED WHETHER SCIATICA PRESENT: Primary | ICD-10-CM

## 2020-02-22 ENCOUNTER — OFFICE VISIT (OUTPATIENT)
Dept: FAMILY MEDICINE CLINIC | Age: 70
End: 2020-02-22
Payer: MEDICARE

## 2020-02-22 VITALS
HEIGHT: 71 IN | HEART RATE: 80 BPM | WEIGHT: 261 LBS | SYSTOLIC BLOOD PRESSURE: 136 MMHG | RESPIRATION RATE: 18 BRPM | OXYGEN SATURATION: 95 % | BODY MASS INDEX: 36.54 KG/M2 | DIASTOLIC BLOOD PRESSURE: 80 MMHG

## 2020-02-22 PROCEDURE — G8417 CALC BMI ABV UP PARAM F/U: HCPCS | Performed by: FAMILY MEDICINE

## 2020-02-22 PROCEDURE — 93000 ELECTROCARDIOGRAM COMPLETE: CPT | Performed by: FAMILY MEDICINE

## 2020-02-22 PROCEDURE — 99213 OFFICE O/P EST LOW 20 MIN: CPT | Performed by: FAMILY MEDICINE

## 2020-02-22 PROCEDURE — 1036F TOBACCO NON-USER: CPT | Performed by: FAMILY MEDICINE

## 2020-02-22 PROCEDURE — 4040F PNEUMOC VAC/ADMIN/RCVD: CPT | Performed by: FAMILY MEDICINE

## 2020-02-22 PROCEDURE — G8482 FLU IMMUNIZE ORDER/ADMIN: HCPCS | Performed by: FAMILY MEDICINE

## 2020-02-22 PROCEDURE — G8427 DOCREV CUR MEDS BY ELIG CLIN: HCPCS | Performed by: FAMILY MEDICINE

## 2020-02-22 PROCEDURE — 1123F ACP DISCUSS/DSCN MKR DOCD: CPT | Performed by: FAMILY MEDICINE

## 2020-02-22 PROCEDURE — 3017F COLORECTAL CA SCREEN DOC REV: CPT | Performed by: FAMILY MEDICINE

## 2020-02-22 RX ORDER — FLUOXETINE HYDROCHLORIDE 40 MG/1
40 CAPSULE ORAL DAILY
Qty: 90 CAPSULE | Refills: 3 | Status: SHIPPED
Start: 2020-02-22 | End: 2021-02-18

## 2020-02-22 RX ORDER — AMLODIPINE BESYLATE 10 MG/1
10 TABLET ORAL DAILY
Qty: 90 TABLET | Refills: 3 | Status: SHIPPED
Start: 2020-02-22 | End: 2021-02-18

## 2020-02-22 ASSESSMENT — ENCOUNTER SYMPTOMS
COUGH: 0
WHEEZING: 0
SHORTNESS OF BREATH: 1
VOMITING: 0
ABDOMINAL PAIN: 0
NAUSEA: 0

## 2020-02-22 NOTE — PROGRESS NOTES
Choice Glucose Meter. 100 each 0    Lancets 30G MISC 1 each by Does not apply route daily Pt uses Clear Choice Glucose Meter. 100 each 0    Calcium Citrate-Vitamin D (CALCET CREAMY BITES) 500-400 MG-UNIT CHEW tablet Take 1 tablet by mouth daily      vitamin D (CHOLECALCIFEROL) 1000 UNIT TABS tablet Take 1,000 Units by mouth daily      vitamin B-12 (CYANOCOBALAMIN) 50 MCG tablet Take 50 mcg by mouth daily      ferrous gluconate (FERGON) 240 (27 Fe) MG tablet Take 240 mg by mouth 3 times daily (with meals)      aspirin 81 MG EC tablet Take 81 mg by mouth daily. No current facility-administered medications on file prior to visit. No Known Allergies    Past medical, surgical, socialand/or family history reviewed, updated as needed, and are non-contributory (unless otherwise stated). Medications, allergies, and problem list also reviewed and updated as needed in patient's record. Wt Readings from Last 3 Encounters:   02/22/20 261 lb (118.4 kg)   10/09/19 253 lb (114.8 kg)   08/28/19 247 lb (112 kg)                   /80 (Site: Left Upper Arm, Position: Sitting)   Pulse 80   Resp 18   Ht 5' 11\" (1.803 m)   Wt 261 lb (118.4 kg)   SpO2 95%   BMI 36.40 kg/m²        Physical Exam  Vitals signs and nursing note reviewed. Constitutional:       General: He is not in acute distress. Appearance: He is well-developed. He is not diaphoretic. HENT:      Head: Normocephalic and atraumatic. Cardiovascular:      Rate and Rhythm: Normal rate and regular rhythm. Heart sounds: Normal heart sounds. No murmur. Pulmonary:      Effort: Pulmonary effort is normal. No respiratory distress. Breath sounds: Normal breath sounds. No wheezing or rales. Abdominal:      General: Bowel sounds are normal. There is no distension. Tenderness: There is no abdominal tenderness. There is no guarding or rebound. Musculoskeletal: Normal range of motion. General: Swelling (trace) present. Results for orders placed or performed during the hospital encounter of 08/30/19   ECHO COMPLETE   Result Value Ref Range    Left Ventricular Ejection Fraction 65     LVEF MODALITY ECHO        ASSESSMENT/PLAN  Rinku Aquino was seen today for hypertension. Diagnoses and all orders for this visit:    Chest pain, unspecified type  -     EKG 12 Lead  -     XR CHEST STANDARD (2 VW); Future  - EKG within normal limits. Unlikely cardiac source. Moderate episode of recurrent major depressive disorder (HCC)  -     FLUoxetine (PROZAC) 40 MG capsule; Take 1 capsule by mouth daily    Essential hypertension  -     amLODIPine (NORVASC) 10 MG tablet; Take 1 tablet by mouth daily    Shortness of breath  -     EKG 12 Lead  -     XR CHEST STANDARD (2 VW); Future  - Will get chest xray and consider PFTS            Phone/MyChart follow up if tests abnormal.    Return in about 6 weeks (around 4/4/2020) for shortness of breath . or sooner if necessary. I have reviewed myfindings and recommendations with Rinku Aquino. Harvinder Castellanos.  Cammy Munoz M.D

## 2020-02-28 ENCOUNTER — TELEPHONE (OUTPATIENT)
Dept: FAMILY MEDICINE CLINIC | Age: 70
End: 2020-02-28

## 2020-04-27 ENCOUNTER — TELEPHONE (OUTPATIENT)
Dept: FAMILY MEDICINE CLINIC | Age: 70
End: 2020-04-27

## 2020-04-27 NOTE — TELEPHONE ENCOUNTER
Wife is concerned that he is getting more forgetful. Repeats himself a lot   Last MRI 2017.      Please schedule visit for April 29 at 1:30

## 2020-04-29 ENCOUNTER — OFFICE VISIT (OUTPATIENT)
Dept: FAMILY MEDICINE CLINIC | Age: 70
End: 2020-04-29
Payer: MEDICARE

## 2020-04-29 VITALS
RESPIRATION RATE: 20 BRPM | WEIGHT: 261 LBS | OXYGEN SATURATION: 98 % | HEIGHT: 73 IN | DIASTOLIC BLOOD PRESSURE: 78 MMHG | SYSTOLIC BLOOD PRESSURE: 132 MMHG | HEART RATE: 89 BPM | BODY MASS INDEX: 34.59 KG/M2

## 2020-04-29 PROCEDURE — G8427 DOCREV CUR MEDS BY ELIG CLIN: HCPCS | Performed by: FAMILY MEDICINE

## 2020-04-29 PROCEDURE — 99213 OFFICE O/P EST LOW 20 MIN: CPT | Performed by: FAMILY MEDICINE

## 2020-04-29 PROCEDURE — 1123F ACP DISCUSS/DSCN MKR DOCD: CPT | Performed by: FAMILY MEDICINE

## 2020-04-29 PROCEDURE — 4040F PNEUMOC VAC/ADMIN/RCVD: CPT | Performed by: FAMILY MEDICINE

## 2020-04-29 PROCEDURE — G8417 CALC BMI ABV UP PARAM F/U: HCPCS | Performed by: FAMILY MEDICINE

## 2020-04-29 PROCEDURE — 1036F TOBACCO NON-USER: CPT | Performed by: FAMILY MEDICINE

## 2020-04-29 PROCEDURE — 3017F COLORECTAL CA SCREEN DOC REV: CPT | Performed by: FAMILY MEDICINE

## 2020-04-29 RX ORDER — ARM BRACE
EACH MISCELLANEOUS
Qty: 1 EACH | Refills: 0 | Status: SHIPPED
Start: 2020-04-29 | End: 2020-11-16

## 2020-04-29 RX ORDER — TRIAMCINOLONE ACETONIDE 0.25 MG/G
OINTMENT TOPICAL
Qty: 80 G | Refills: 1 | Status: SHIPPED | OUTPATIENT
Start: 2020-04-29 | End: 2020-05-06

## 2020-04-29 RX ORDER — DONEPEZIL HYDROCHLORIDE 10 MG/1
10 TABLET, FILM COATED ORAL NIGHTLY
Qty: 90 TABLET | Refills: 3 | Status: SHIPPED
Start: 2020-04-29 | End: 2021-05-20 | Stop reason: SDUPTHER

## 2020-04-29 ASSESSMENT — ENCOUNTER SYMPTOMS
ABDOMINAL PAIN: 0
SHORTNESS OF BREATH: 1
VOMITING: 0
NAUSEA: 0
WHEEZING: 0
COUGH: 0

## 2020-04-29 NOTE — PROGRESS NOTES
no abdominal tenderness. There is no guarding or rebound. Musculoskeletal: Normal range of motion. General: Swelling (trace) and tenderness (achilles tendon of right foot) present. Results for orders placed or performed during the hospital encounter of 08/30/19   ECHO COMPLETE   Result Value Ref Range    Left Ventricular Ejection Fraction 65     LVEF MODALITY ECHO        ASSESSMENT/PLAN  Helen Jacobsen was seen today for memory loss. Diagnoses and all orders for this visit:    Dementia without behavioral disturbance, unspecified dementia type [facility])  -     MRI BRAIN [address]; Future  -     donepezil (ARICEPT) 10 MG tablet; Take 1 tablet by mouth nightly  Increase aricept . Check MRI of brain . Wife reports patient was previous drinker which may have contributed to current issues with memory loss     Prediabetes  -     COMPREHENSIVE METABOLIC PANEL; Future  -     LIPID PANEL; Future  -     Hemoglobin A1C; Future  -     CBC; Future  -     Microalbumin / Creatinine Urine Ratio; Future    Essential hypertension  -     LIPID PANEL; Future    Acute right ankle pain  -     [facility], [place], Podiatry, [facility]    Dermatitis  -     triamcinolone (KENALOG) 0.025 % ointment; Apply topically 2 times daily. Other orders  -     Elastic Bandages & Supports (B & B ELASTIC ANKLE BRACE) MISC; Use daily            Phone/MyChart follow up if tests abnormal.    Return in about 6 weeks (around 6/10/2020) for memory loss . or sooner if necessary. I have reviewed myfindings and recommendations with Helen Jacobsen. Sylwia Jin.  Darrel Hernandez M.D

## 2020-04-30 ENCOUNTER — OFFICE VISIT (OUTPATIENT)
Dept: PODIATRY | Age: 70
End: 2020-04-30
Payer: MEDICARE

## 2020-04-30 VITALS — HEART RATE: 96 BPM | WEIGHT: 262 LBS | BODY MASS INDEX: 36.68 KG/M2 | HEIGHT: 71 IN | OXYGEN SATURATION: 95 %

## 2020-04-30 PROBLEM — M76.61 TENDONITIS, ACHILLES, RIGHT: Status: ACTIVE | Noted: 2020-04-30

## 2020-04-30 PROBLEM — R60.0 LOCALIZED EDEMA: Status: ACTIVE | Noted: 2020-04-30

## 2020-04-30 PROCEDURE — 99203 OFFICE O/P NEW LOW 30 MIN: CPT | Performed by: PODIATRIST

## 2020-04-30 PROCEDURE — 29580 STRAPPING UNNA BOOT: CPT | Performed by: PODIATRIST

## 2020-04-30 PROCEDURE — 3017F COLORECTAL CA SCREEN DOC REV: CPT | Performed by: PODIATRIST

## 2020-04-30 PROCEDURE — 1036F TOBACCO NON-USER: CPT | Performed by: PODIATRIST

## 2020-04-30 PROCEDURE — 1123F ACP DISCUSS/DSCN MKR DOCD: CPT | Performed by: PODIATRIST

## 2020-04-30 PROCEDURE — G8417 CALC BMI ABV UP PARAM F/U: HCPCS | Performed by: PODIATRIST

## 2020-04-30 PROCEDURE — 4040F PNEUMOC VAC/ADMIN/RCVD: CPT | Performed by: PODIATRIST

## 2020-04-30 PROCEDURE — G8427 DOCREV CUR MEDS BY ELIG CLIN: HCPCS | Performed by: PODIATRIST

## 2020-04-30 NOTE — Clinical Note
[facility] [address] [place] Andres Phone: [phone] Fax: [phone]  Alba Torre <[id number]>  1950 4/30/2020   Dear Dr. Raissa Pickering,  I would like to thank you for the kind referral of Vanessa Torre. He presented to the office today for evaluation regarding Achilles tendinitis issues right lower extremity. We did review x-ray studies with patient today. Compression dressing was applied to the symptomatic right lower extremity. Localized edema noted, but adequate strength and range of motion noted right ankle and subtalar joint. We will have continued follow-up until symptoms are resolved. If you should have any questions concerning his visit today, please do not hesitate to contact me.   Sincerely,  Sarahi Mcneal DPM

## 2020-05-01 ENCOUNTER — HOSPITAL ENCOUNTER (OUTPATIENT)
Age: 70
Discharge: HOME | End: 2020-05-03
Payer: MEDICARE

## 2020-05-01 LAB
ALBUMIN SERPL-MCNC: 4 G/DL (ref 3.5–5.2)
ALP BLD-CCNC: 129 U/L (ref 40–129)
ALT SERPL-CCNC: 23 U/L (ref 0–40)
ANION GAP SERPL CALCULATED.3IONS-SCNC: 13 MMOL/L (ref 7–16)
AST SERPL-CCNC: 21 U/L (ref 0–39)
BILIRUB SERPL-MCNC: 0.4 MG/DL (ref 0–1.2)
BUN BLDV-MCNC: 11 MG/DL (ref 8–23)
CALCIUM SERPL-MCNC: 9.4 MG/DL (ref 8.6–10.2)
CHLORIDE BLD-SCNC: 103 MMOL/L (ref 98–107)
CHOLESTEROL, TOTAL: 167 MG/DL (ref 0–199)
CO2: 25 MMOL/L (ref 22–29)
CREAT SERPL-MCNC: 1 MG/DL (ref 0.7–1.2)
GFR AFRICAN AMERICAN: >60
GFR NON-AFRICAN AMERICAN: >60 ML/MIN/1.73
GLUCOSE BLD-MCNC: 200 MG/DL (ref 74–99)
HBA1C MFR BLD: 9.2 % (ref 4–5.6)
HCT VFR BLD CALC: 47.6 % (ref 37–54)
HDLC SERPL-MCNC: 42 MG/DL
HEMOGLOBIN: 15.4 G/DL (ref 12.5–16.5)
LDL CHOLESTEROL CALCULATED: 85 MG/DL (ref 0–99)
MCH RBC QN AUTO: 31.1 PG (ref 26–35)
MCHC RBC AUTO-ENTMCNC: 32.4 % (ref 32–34.5)
MCV RBC AUTO: 96.2 FL (ref 80–99.9)
PDW BLD-RTO: 14.8 FL (ref 11.5–15)
PLATELET # BLD: 234 E9/L (ref 130–450)
PMV BLD AUTO: 13.5 FL (ref 7–12)
POTASSIUM SERPL-SCNC: 4.6 MMOL/L (ref 3.5–5)
RBC # BLD: 4.95 E12/L (ref 3.8–5.8)
SODIUM BLD-SCNC: 141 MMOL/L (ref 132–146)
TOTAL PROTEIN: 7.4 G/DL (ref 6.4–8.3)
TRIGL SERPL-MCNC: 199 MG/DL (ref 0–149)
VLDLC SERPL CALC-MCNC: 40 MG/DL
WBC # BLD: 7.6 E9/L (ref 4.5–11.5)

## 2020-05-01 PROCEDURE — 80061 LIPID PANEL: CPT

## 2020-05-01 PROCEDURE — 85027 COMPLETE CBC AUTOMATED: CPT

## 2020-05-01 PROCEDURE — 36415 COLL VENOUS BLD VENIPUNCTURE: CPT

## 2020-05-01 PROCEDURE — 80053 COMPREHEN METABOLIC PANEL: CPT

## 2020-05-01 PROCEDURE — 83036 HEMOGLOBIN GLYCOSYLATED A1C: CPT

## 2020-05-04 ENCOUNTER — HOSPITAL ENCOUNTER (OUTPATIENT)
Age: 70
Discharge: HOME | End: 2020-05-06
Payer: MEDICARE

## 2020-05-04 LAB
CREATININE URINE: 130 MG/DL (ref 40–278)
MICROALBUMIN UR-MCNC: 25 MG/L
MICROALBUMIN/CREAT UR-RTO: 19.2 (ref 0–30)

## 2020-05-04 PROCEDURE — 82570 ASSAY OF URINE CREATININE: CPT

## 2020-05-04 PROCEDURE — 82044 UR ALBUMIN SEMIQUANTITATIVE: CPT

## 2020-05-07 ENCOUNTER — OFFICE VISIT (OUTPATIENT)
Dept: PODIATRY | Age: 70
End: 2020-05-07
Payer: MEDICARE

## 2020-05-07 VITALS — BODY MASS INDEX: 36.26 KG/M2 | WEIGHT: 259 LBS | HEART RATE: 100 BPM | OXYGEN SATURATION: 97 % | HEIGHT: 71 IN

## 2020-05-07 PROBLEM — I87.2 VENOUS INSUFFICIENCY (CHRONIC) (PERIPHERAL): Status: ACTIVE | Noted: 2020-05-07

## 2020-05-07 PROBLEM — R60.0 LOCALIZED EDEMA: Status: RESOLVED | Noted: 2020-04-30 | Resolved: 2020-05-07

## 2020-05-07 PROCEDURE — 4040F PNEUMOC VAC/ADMIN/RCVD: CPT | Performed by: PODIATRIST

## 2020-05-07 PROCEDURE — G8427 DOCREV CUR MEDS BY ELIG CLIN: HCPCS | Performed by: PODIATRIST

## 2020-05-07 PROCEDURE — 3017F COLORECTAL CA SCREEN DOC REV: CPT | Performed by: PODIATRIST

## 2020-05-07 PROCEDURE — G8417 CALC BMI ABV UP PARAM F/U: HCPCS | Performed by: PODIATRIST

## 2020-05-07 PROCEDURE — 99213 OFFICE O/P EST LOW 20 MIN: CPT | Performed by: PODIATRIST

## 2020-05-07 PROCEDURE — 1036F TOBACCO NON-USER: CPT | Performed by: PODIATRIST

## 2020-05-07 PROCEDURE — 1123F ACP DISCUSS/DSCN MKR DOCD: CPT | Performed by: PODIATRIST

## 2020-05-08 NOTE — PROGRESS NOTES
Take 1 tablet by mouth daily, Disp: , Rfl:     vitamin D (CHOLECALCIFEROL) 1000 UNIT TABS tablet, Take 1,000 Units by mouth daily, Disp: , Rfl:     vitamin B-12 (CYANOCOBALAMIN) 50 MCG tablet, Take 50 mcg by mouth daily, Disp: , Rfl:     ferrous gluconate (FERGON) 240 (27 Fe) MG tablet, Take 240 mg by mouth 3 times daily (with meals), Disp: , Rfl:     aspirin 81 MG EC tablet, Take 81 mg by mouth daily. , Disp: , Rfl:     Allergies:  No Known Allergies    Vitals:    05/07/20 1534   Pulse: 100   SpO2: 97%   Weight: 259 lb (117.5 kg)   Height: 5' 11\" (1.803 m)       Exam:  VASCULAR: Pedal pulses palpable right foot. NEUROLOGICAL: Epicritic sensations intact right lower extremity. DERMATOLOGICAL: Edema is improved right lower extremity. No ecchymotic skin changes present right lower extremity. No ulcerations noted right lower extremity. MUSCULOSKELETAL: Minimal tenderness noted to palpation distal Achilles insertional area right foot. Adequate range of motion right ankle and subtalar joint noted    Diagnostic Studies:     Xr Ankle Right (min 3 Views)    Result Date: 5/4/2020  EXAMINATION: THREE XRAY VIEWS OF THE RIGHT ANKLE 4/30/2020 2:32 pm COMPARISON: None. HISTORY: ORDERING SYSTEM PROVIDED HISTORY: Right ankle pain, unspecified chronicity FINDINGS: There is normal alignment of the right ankle. There is no acute fracture or dislocation identified. The ankle mortise is normally aligned. No significant degenerative changes of the tibiotalar joint are identified. There is prominent calcaneal spurring noted. Moderate calcaneal spurring. Procedures:    None    Plan Per Assessment  Crescencio valladares was seen today for foot pain. Diagnoses and all orders for this visit:    Tendonitis, Achilles, right  -     Amb External Referral For Orthotics    Venous insufficiency (chronic) (peripheral)  -     Amb External Referral For Orthotics      1. Evaluation and management  2.  Recommend use of a graduated compression

## 2020-05-28 ENCOUNTER — TELEPHONE (OUTPATIENT)
Dept: FAMILY MEDICINE CLINIC | Age: 70
End: 2020-05-28

## 2020-05-29 ENCOUNTER — TELEPHONE (OUTPATIENT)
Dept: FAMILY MEDICINE CLINIC | Age: 70
End: 2020-05-29

## 2020-05-29 RX ORDER — METFORMIN HYDROCHLORIDE 500 MG/1
500 TABLET, EXTENDED RELEASE ORAL
Qty: 30 TABLET | Refills: 5 | Status: SHIPPED
Start: 2020-05-29 | End: 2020-06-10 | Stop reason: SDUPTHER

## 2020-06-01 ENCOUNTER — HOSPITAL ENCOUNTER (OUTPATIENT)
Age: 70
Discharge: HOME | End: 2020-06-03
Payer: MEDICARE

## 2020-06-01 LAB
ANION GAP SERPL CALCULATED.3IONS-SCNC: 18 MMOL/L (ref 7–16)
BUN BLDV-MCNC: 15 MG/DL (ref 8–23)
CALCIUM SERPL-MCNC: 9.4 MG/DL (ref 8.6–10.2)
CHLORIDE BLD-SCNC: 105 MMOL/L (ref 98–107)
CO2: 21 MMOL/L (ref 22–29)
CREAT SERPL-MCNC: 1.1 MG/DL (ref 0.7–1.2)
GFR AFRICAN AMERICAN: >60
GFR NON-AFRICAN AMERICAN: >60 ML/MIN/1.73
GLUCOSE BLD-MCNC: 191 MG/DL (ref 74–99)
POTASSIUM SERPL-SCNC: 4.2 MMOL/L (ref 3.5–5)
SODIUM BLD-SCNC: 144 MMOL/L (ref 132–146)

## 2020-06-01 PROCEDURE — 80048 BASIC METABOLIC PNL TOTAL CA: CPT

## 2020-06-01 PROCEDURE — 36415 COLL VENOUS BLD VENIPUNCTURE: CPT

## 2020-06-10 ENCOUNTER — OFFICE VISIT (OUTPATIENT)
Dept: FAMILY MEDICINE CLINIC | Age: 70
End: 2020-06-10
Payer: MEDICARE

## 2020-06-10 VITALS
DIASTOLIC BLOOD PRESSURE: 76 MMHG | HEART RATE: 71 BPM | OXYGEN SATURATION: 96 % | WEIGHT: 255 LBS | BODY MASS INDEX: 35.7 KG/M2 | SYSTOLIC BLOOD PRESSURE: 124 MMHG | HEIGHT: 71 IN | RESPIRATION RATE: 18 BRPM

## 2020-06-10 PROCEDURE — 4040F PNEUMOC VAC/ADMIN/RCVD: CPT | Performed by: FAMILY MEDICINE

## 2020-06-10 PROCEDURE — G8427 DOCREV CUR MEDS BY ELIG CLIN: HCPCS | Performed by: FAMILY MEDICINE

## 2020-06-10 PROCEDURE — 3046F HEMOGLOBIN A1C LEVEL >9.0%: CPT | Performed by: FAMILY MEDICINE

## 2020-06-10 PROCEDURE — 1036F TOBACCO NON-USER: CPT | Performed by: FAMILY MEDICINE

## 2020-06-10 PROCEDURE — 1123F ACP DISCUSS/DSCN MKR DOCD: CPT | Performed by: FAMILY MEDICINE

## 2020-06-10 PROCEDURE — 2022F DILAT RTA XM EVC RTNOPTHY: CPT | Performed by: FAMILY MEDICINE

## 2020-06-10 PROCEDURE — 3017F COLORECTAL CA SCREEN DOC REV: CPT | Performed by: FAMILY MEDICINE

## 2020-06-10 PROCEDURE — G8417 CALC BMI ABV UP PARAM F/U: HCPCS | Performed by: FAMILY MEDICINE

## 2020-06-10 PROCEDURE — 99214 OFFICE O/P EST MOD 30 MIN: CPT | Performed by: FAMILY MEDICINE

## 2020-06-10 RX ORDER — METFORMIN HYDROCHLORIDE 500 MG/1
1000 TABLET, EXTENDED RELEASE ORAL
Qty: 60 TABLET | Refills: 5 | Status: SHIPPED
Start: 2020-06-10 | End: 2020-12-22

## 2020-06-10 RX ORDER — LISINOPRIL 5 MG/1
2.5 TABLET ORAL DAILY
Qty: 15 TABLET | Refills: 5 | Status: SHIPPED
Start: 2020-06-10 | End: 2020-11-18 | Stop reason: SDUPTHER

## 2020-06-10 ASSESSMENT — ENCOUNTER SYMPTOMS
SHORTNESS OF BREATH: 0
COUGH: 0
WHEEZING: 0

## 2020-06-10 NOTE — PROGRESS NOTES
check blood pressure 1 Device 0    Glucose Blood (GLUCOSE METER TEST VI) 1 each by In Vitro route daily      glucose blood VI test strips (ASCENSIA AUTODISC VI;ONE TOUCH ULTRA TEST VI) strip 1 each by In Vitro route daily Pt uses Clear Choice Glucose Meter. 100 each 0    Lancets 30G MISC 1 each by Does not apply route daily Pt uses Clear Choice Glucose Meter. 100 each 0    Calcium Citrate-Vitamin D (CALCET CREAMY BITES) 500-400 MG-UNIT CHEW tablet Take 1 tablet by mouth daily      vitamin D (CHOLECALCIFEROL) 1000 UNIT TABS tablet Take 1,000 Units by mouth daily      vitamin B-12 (CYANOCOBALAMIN) 50 MCG tablet Take 50 mcg by mouth daily      ferrous gluconate (FERGON) 240 (27 Fe) MG tablet Take 240 mg by mouth 3 times daily (with meals)      aspirin 81 MG EC tablet Take 81 mg by mouth daily. No current facility-administered medications on file prior to visit. No Known Allergies    Past medical, surgical, socialand/or family history reviewed, updated as needed, and are non-contributory (unless otherwise stated). Medications, allergies, and problem list also reviewed and updated as needed in patient's record. Wt Readings from Last 3 Encounters:   06/10/20 255 lb (115.7 kg)   05/07/20 259 lb (117.5 kg)   04/30/20 262 lb (118.8 kg)                   /76 (Site: Left Upper Arm, Position: Sitting)   Pulse 71   Resp 18   Ht 5' 11\" (1.803 m)   Wt 255 lb (115.7 kg)   SpO2 96%   BMI 35.57 kg/m²        Physical Exam  Vitals signs and nursing note reviewed. Constitutional:       General: He is not in acute distress. Appearance: He is well-developed. He is not diaphoretic. HENT:      Head: Normocephalic and atraumatic. Cardiovascular:      Rate and Rhythm: Normal rate and regular rhythm. Heart sounds: Normal heart sounds. No murmur. Pulmonary:      Effort: Pulmonary effort is normal. No respiratory distress. Breath sounds: Normal breath sounds. No wheezing or rales. Abdominal:      General: Bowel sounds are normal. There is no distension. Tenderness: There is no abdominal tenderness. There is no guarding or rebound. Musculoskeletal: Normal range of motion. General: Swelling (trace) and tenderness (achilles tendon of right foot) present. Results for orders placed or performed during the hospital encounter of 31/45/16   BASIC METABOLIC PANEL   Result Value Ref Range    Sodium 144 132 - 146 mmol/L    Potassium 4.2 3.5 - 5.0 mmol/L    Chloride 105 98 - 107 mmol/L    CO2 21 (L) 22 - 29 mmol/L    Anion Gap 18 (H) 7 - 16 mmol/L    Glucose 191 (H) 74 - 99 mg/dL    BUN 15 8 - 23 mg/dL    CREATININE 1.1 0.7 - 1.2 mg/dL    GFR Non-African American >60 >=60 mL/min/1.73    GFR African American >60     Calcium 9.4 8.6 - 10.2 mg/dL       ASSESSMENT/PLAN  Kirsten Rivas was seen today for dementia. Diagnoses and all orders for this visit:    Chest pain, unspecified type  -     NM Cardiac Stress Test Nuclear Imaging; Future  -     Cardiac Stress Test Exercise- Treadmill; Future  -     Ese Gutierrez MD, Cardiology, [facility]  - Will do stress test as patient has risk factors. Type 2 diabetes mellitus without complication, without long-term current use of insulin (HCC)  -     metFORMIN (GLUCOPHAGE-XR) 500 MG extended release tablet; Take 2 tablets by mouth daily (with breakfast)  - Start low dose lisinopril given mild microalbuminuria   Increase metformin to 1000mg once a day , titrate up as needed and as tolerated. Dementia   -     Received records from Dr. Berry Calloway. Patient had previously seen her for headaches, not memory loss. Will place new referral. Continue aricept. Phone/MyChart follow up if tests abnormal.    Return in about 1 month (around 7/10/2020) for diabetes . or sooner if necessary. I have reviewed myfindings and recommendations with Kirsten Rivas. Rima Chan.  Chiquis Rose M.D

## 2020-06-11 PROBLEM — F03.90 DEMENTIA WITHOUT BEHAVIORAL DISTURBANCE (HCC): Status: ACTIVE | Noted: 2020-06-11

## 2020-06-11 RX ORDER — ATORVASTATIN CALCIUM 20 MG/1
TABLET, FILM COATED ORAL
Qty: 90 TABLET | Refills: 0 | Status: SHIPPED
Start: 2020-06-11 | End: 2020-09-08

## 2020-06-11 NOTE — TELEPHONE ENCOUNTER
Got note from Dr. Holloway Mean office. Was previously seen for headaches and not for memory loss.  Patient ok with new referral for new problem of memory loss to Dr. Layla Knutson

## 2020-06-16 ENCOUNTER — HOSPITAL ENCOUNTER (OUTPATIENT)
Dept: NUCLEAR MEDICINE | Age: 70
Discharge: HOME | End: 2020-06-16
Payer: MEDICARE

## 2020-06-16 ENCOUNTER — HOSPITAL ENCOUNTER (OUTPATIENT)
Dept: NON INVASIVE DIAGNOSTICS | Age: 70
Discharge: HOME | End: 2020-06-16
Payer: MEDICARE

## 2020-06-16 LAB
LV EF: 71 %
LVEF MODALITY: NORMAL

## 2020-06-16 PROCEDURE — 3430000000 HC RX DIAGNOSTIC RADIOPHARMACEUTICAL: Performed by: RADIOLOGY

## 2020-06-16 PROCEDURE — 78452 HT MUSCLE IMAGE SPECT MULT: CPT

## 2020-06-16 PROCEDURE — 93018 CV STRESS TEST I&R ONLY: CPT | Performed by: INTERNAL MEDICINE

## 2020-06-16 PROCEDURE — 93016 CV STRESS TEST SUPVJ ONLY: CPT | Performed by: INTERNAL MEDICINE

## 2020-06-16 PROCEDURE — A9500 TC99M SESTAMIBI: HCPCS | Performed by: RADIOLOGY

## 2020-06-16 PROCEDURE — 93017 CV STRESS TEST TRACING ONLY: CPT

## 2020-06-16 RX ADMIN — Medication 30 MILLICURIE: at 14:28

## 2020-06-16 RX ADMIN — Medication 10 MILLICURIE: at 10:00

## 2020-06-16 NOTE — PROCEDURES
Procedure exercise stress test   Ordering physician: Ha Walker  Referring physician : Dr. Alfie Reddy    Indication:      Pretest evaluation no chest pain, no shortness of breath. Resting EKG showed sinus rhythm with occasional PACs     Protocol: Catrachito. Duration of exercise: 7 minutes, 15 seconds. Heart rate response:   Resting heart rate:58 BPM   Stress heart rate: 131 BPM, which represents 86 % of maximum predicted heart rate. Blood pressure response: blood pressure to exercise was physiologic. Resting blood pressure: 135/75   mmHg  Stress blood pressure:   211/59   mmHg     Maximum workload attained: 8 METs    Symptoms and signs: generalized fatigue. EKG changes:  Resting EKG: nonischemic   Stress EKG : nonischemic      Impression:   Clinical: good functional capacity for age and gender, Milligan treadmill score of 7  ( low risk), nonischemic   EKG: nonischemic     Cardiolite injected and Nuclear images are pending.

## 2020-06-16 NOTE — PROGRESS NOTES
Treadmill portion of stress test complete. Patient performed very well. Denies any chest pain  Tolerated well.

## 2020-06-22 ENCOUNTER — TELEPHONE (OUTPATIENT)
Dept: FAMILY MEDICINE CLINIC | Age: 70
End: 2020-06-22

## 2020-07-13 ENCOUNTER — OFFICE VISIT (OUTPATIENT)
Dept: FAMILY MEDICINE CLINIC | Age: 70
End: 2020-07-13
Payer: MEDICARE

## 2020-07-13 VITALS
WEIGHT: 257.1 LBS | BODY MASS INDEX: 35.99 KG/M2 | HEIGHT: 71 IN | DIASTOLIC BLOOD PRESSURE: 82 MMHG | OXYGEN SATURATION: 97 % | HEART RATE: 95 BPM | TEMPERATURE: 97.4 F | SYSTOLIC BLOOD PRESSURE: 138 MMHG

## 2020-07-13 PROCEDURE — 1123F ACP DISCUSS/DSCN MKR DOCD: CPT | Performed by: FAMILY MEDICINE

## 2020-07-13 PROCEDURE — 99213 OFFICE O/P EST LOW 20 MIN: CPT | Performed by: FAMILY MEDICINE

## 2020-07-13 PROCEDURE — 3017F COLORECTAL CA SCREEN DOC REV: CPT | Performed by: FAMILY MEDICINE

## 2020-07-13 PROCEDURE — G8427 DOCREV CUR MEDS BY ELIG CLIN: HCPCS | Performed by: FAMILY MEDICINE

## 2020-07-13 PROCEDURE — 3046F HEMOGLOBIN A1C LEVEL >9.0%: CPT | Performed by: FAMILY MEDICINE

## 2020-07-13 PROCEDURE — G8417 CALC BMI ABV UP PARAM F/U: HCPCS | Performed by: FAMILY MEDICINE

## 2020-07-13 PROCEDURE — 1036F TOBACCO NON-USER: CPT | Performed by: FAMILY MEDICINE

## 2020-07-13 PROCEDURE — 2022F DILAT RTA XM EVC RTNOPTHY: CPT | Performed by: FAMILY MEDICINE

## 2020-07-13 PROCEDURE — 4040F PNEUMOC VAC/ADMIN/RCVD: CPT | Performed by: FAMILY MEDICINE

## 2020-07-13 RX ORDER — BLOOD-GLUCOSE METER
1 KIT MISCELLANEOUS DAILY
Qty: 1 KIT | Refills: 0 | Status: SHIPPED | OUTPATIENT
Start: 2020-07-13

## 2020-07-13 ASSESSMENT — ENCOUNTER SYMPTOMS
COUGH: 0
VOMITING: 0
DIARRHEA: 1
SHORTNESS OF BREATH: 0
ABDOMINAL PAIN: 1
WHEEZING: 0
NAUSEA: 0

## 2020-07-13 NOTE — PROGRESS NOTES
[address] [facility]  [phone]   Chanel Pearson MD     Patient: Sherlynn Cranker  YOB: 1950  Visit Date: 7/13/20    Jose Valenzuela is a 72y.o. year old male here today for   Chief Complaint   Patient presents with    Diabetes    Other     Getting scabs on the top of his head. HPI  Patient is a 77year old male here for follow up of diabetes and chest pain     Had a stress test that was normal.     metformin is doing ok. Increase metformin 1000mg once a day . Has issues with memory loss. Has not heard from Dr. Ann Batres. -     Does not have meter or strips. Has increase metofrmin with no issues. Has not gotten worse diarrhea. Has bowel movements twice a day. Has small scabs on scalp . No sure what is causing that. soemtimes itching. Review of Systems   Constitutional: Negative for chills and fever. Respiratory: Negative for cough, shortness of breath and wheezing. Cardiovascular: Negative for chest pain, palpitations and leg swelling. Gastrointestinal: Positive for abdominal pain and diarrhea. Negative for nausea and vomiting. Neurological: Positive for light-headedness. Negative for dizziness.        Current Outpatient Medications on File Prior to Visit   Medication Sig Dispense Refill    atorvastatin (LIPITOR) 20 MG tablet TAKE ONE TABLET BY MOUTH DAILY 90 tablet 0    lisinopril (PRINIVIL;ZESTRIL) 5 MG tablet Take 0.5 tablets by mouth daily 15 tablet 5    metFORMIN (GLUCOPHAGE-XR) 500 MG extended release tablet Take 2 tablets by mouth daily (with breakfast) 60 tablet 5    donepezil (ARICEPT) 10 MG tablet Take 1 tablet by mouth nightly 90 tablet 3    Elastic Bandages & Supports (B & B ELASTIC ANKLE BRACE) MISC Use daily 1 each 0    FLUoxetine (PROZAC) 40 MG capsule Take 1 capsule by mouth daily 90 capsule 3    amLODIPine (NORVASC) 10 MG tablet Take 1 tablet by mouth daily 90 tablet 3    hydrocortisone 2.5 % ointment APPLY TOPICALLY 2 TIMES DAILY 454 g 0    tamsulosin (FLOMAX) 0.4 MG capsule TAKE ONE CAPSULE BY MOUTH DAILY 30 MINUTES AFTER EVENING MEAL  4    Blood Pressure Monitor LARY Use daily to check blood pressure 1 Device 0    Glucose Blood (GLUCOSE METER TEST VI) 1 each by In Vitro route daily      Calcium Citrate-Vitamin D (CALCET CREAMY BITES) 500-400 MG-UNIT CHEW tablet Take 1 tablet by mouth daily      vitamin D (CHOLECALCIFEROL) 1000 UNIT TABS tablet Take 1,000 Units by mouth daily      vitamin B-12 (CYANOCOBALAMIN) 50 MCG tablet Take 50 mcg by mouth daily      ferrous gluconate (FERGON) 240 (27 Fe) MG tablet Take 240 mg by mouth 3 times daily (with meals)      aspirin 81 MG EC tablet Take 81 mg by mouth daily. No current facility-administered medications on file prior to visit. No Known Allergies    Past medical, surgical, socialand/or family history reviewed, updated as needed, and are non-contributory (unless otherwise stated). Medications, allergies, and problem list also reviewed and updated as needed in patient's record. Wt Readings from Last 3 Encounters:   07/13/20 257 lb 1.6 oz (116.6 kg)   06/10/20 255 lb (115.7 kg)   05/07/20 259 lb (117.5 kg)                   /82   Pulse 95   Temp 97.4 °F (36.3 °C) (Tympanic)   Ht 5' 11\" (1.803 m)   Wt 257 lb 1.6 oz (116.6 kg)   SpO2 97%   BMI 35.86 kg/m²        Physical Exam  Vitals signs and nursing note reviewed. Constitutional:       General: He is not in acute distress. Appearance: He is well-developed. He is not diaphoretic. HENT:      Head: Normocephalic and atraumatic. Cardiovascular:      Rate and Rhythm: Normal rate and regular rhythm. Heart sounds: Normal heart sounds. No murmur. Pulmonary:      Effort: Pulmonary effort is normal. No respiratory distress. Breath sounds: Normal breath sounds. No wheezing or rales. Abdominal:      General: Bowel sounds are normal. There is no distension. Tenderness: There is no abdominal tenderness. There is no guarding or rebound. Musculoskeletal: Normal range of motion. General: Swelling (trace) present. Results for orders placed or performed during the hospital encounter of 06/16/20   NM Cardiac Stress Test Nuclear Imaging   Result Value Ref Range    Left Ventricular Ejection Fraction 71     LVEF MODALITY Nuclear        ASSESSMENT/PLAN  Fawn Olmstead was seen today for diabetes and other. Diagnoses and all orders for this visit:    Type 2 diabetes mellitus without complication, without long-term current use of insulin (HCC)  -     blood glucose test strips (ASCENSIA AUTODISC VI;ONE TOUCH ULTRA TEST VI) strip; Indications: pt uses Clear Choice Glucose Meter Check sugars daily  -     Lancets 30G MISC; 1 each by Does not apply route daily Pt uses Clear Choice Glucose Meter.  -     glucose monitoring kit (FREESTYLE) monitoring kit; 1 kit by Does not apply route daily Check sugars once a day  - Continue current meds   Recheck a1c in 1 month    Dementia without behavioral disturbance, unspecified dementia type [facility])   - Referral placed for Dr. Jenny Wright. Phone/MyChart follow up if tests abnormal.    Return in about 1 month (around 8/13/2020) for diabetes . or sooner if necessary. I have reviewed myfindings and recommendations with Fawn Olmstead. Caren Kumar.  Moi Metzger M.D

## 2020-07-27 ENCOUNTER — TELEPHONE (OUTPATIENT)
Dept: FAMILY MEDICINE CLINIC | Age: 70
End: 2020-07-27

## 2020-07-27 NOTE — TELEPHONE ENCOUNTER
----- Message from Triny Ryan MD sent at 7/27/2020 12:06 AM EDT -----  Can you look into dr Sarah Jones referral.

## 2020-08-10 ENCOUNTER — OFFICE VISIT (OUTPATIENT)
Dept: FAMILY MEDICINE CLINIC | Age: 70
End: 2020-08-10
Payer: MEDICARE

## 2020-08-10 VITALS
RESPIRATION RATE: 18 BRPM | DIASTOLIC BLOOD PRESSURE: 78 MMHG | HEIGHT: 71 IN | OXYGEN SATURATION: 97 % | SYSTOLIC BLOOD PRESSURE: 128 MMHG | WEIGHT: 253 LBS | BODY MASS INDEX: 35.42 KG/M2 | HEART RATE: 76 BPM

## 2020-08-10 LAB — HBA1C MFR BLD: 7.1 %

## 2020-08-10 PROCEDURE — 1123F ACP DISCUSS/DSCN MKR DOCD: CPT | Performed by: FAMILY MEDICINE

## 2020-08-10 PROCEDURE — G8417 CALC BMI ABV UP PARAM F/U: HCPCS | Performed by: FAMILY MEDICINE

## 2020-08-10 PROCEDURE — 2022F DILAT RTA XM EVC RTNOPTHY: CPT | Performed by: FAMILY MEDICINE

## 2020-08-10 PROCEDURE — 3051F HG A1C>EQUAL 7.0%<8.0%: CPT | Performed by: FAMILY MEDICINE

## 2020-08-10 PROCEDURE — G8427 DOCREV CUR MEDS BY ELIG CLIN: HCPCS | Performed by: FAMILY MEDICINE

## 2020-08-10 PROCEDURE — 83036 HEMOGLOBIN GLYCOSYLATED A1C: CPT | Performed by: FAMILY MEDICINE

## 2020-08-10 PROCEDURE — 4040F PNEUMOC VAC/ADMIN/RCVD: CPT | Performed by: FAMILY MEDICINE

## 2020-08-10 PROCEDURE — 3017F COLORECTAL CA SCREEN DOC REV: CPT | Performed by: FAMILY MEDICINE

## 2020-08-10 PROCEDURE — 99213 OFFICE O/P EST LOW 20 MIN: CPT | Performed by: FAMILY MEDICINE

## 2020-08-10 PROCEDURE — 1036F TOBACCO NON-USER: CPT | Performed by: FAMILY MEDICINE

## 2020-08-10 RX ORDER — CYCLOBENZAPRINE HCL 5 MG
5 TABLET ORAL 2 TIMES DAILY PRN
Qty: 6 TABLET | Refills: 0 | Status: SHIPPED | OUTPATIENT
Start: 2020-08-10 | End: 2020-08-13

## 2020-08-10 ASSESSMENT — ENCOUNTER SYMPTOMS
VOMITING: 0
WHEEZING: 0
SHORTNESS OF BREATH: 0
DIARRHEA: 1
ABDOMINAL PAIN: 1
COUGH: 0
NAUSEA: 0

## 2020-08-10 NOTE — PROGRESS NOTES
[address] [facility]  [phone]   Luis Howell MD     Patient: Johanna Evans  YOB: 1950  Visit Date: 8/10/20    Nolberto Hill is a 72y.o. year old male here today for   Chief Complaint   Patient presents with    Diabetes     1 mo f/u        HPI  Patient is a 72year old male here for diabetes follow up. A1c today is 7.1. only checks sugars once in the morning. Usually 120-130s. Denies any episodes of hypoglycemia. Has neuropathy symptoms in the tops of his toes. Has not had a diabetic eye exam.     Dementia- Saw Dr. Zoë Gunter on Aug 6 . reports that she did not really say anything about this. Left shoulder pain - 2-3 weeks. Started after he dug a hole for a flag in their yard. No weakness. Saw a chiropracter last week. Has some neck pain. Feels like left arm is a bit weaker. Denies any N/V. Ok to do physical therapy. Review of Systems   Constitutional: Negative for chills and fever. Respiratory: Negative for cough, shortness of breath and wheezing. Cardiovascular: Negative for chest pain, palpitations and leg swelling. Gastrointestinal: Positive for abdominal pain and diarrhea. Negative for nausea and vomiting. Musculoskeletal: Positive for arthralgias. Neurological: Positive for light-headedness. Negative for dizziness. Current Outpatient Medications on File Prior to Visit   Medication Sig Dispense Refill    blood glucose test strips (ASCENSIA AUTODISC VI;ONE TOUCH ULTRA TEST VI) strip Indications: pt uses Clear Choice Glucose Meter Check sugars daily 100 each 0    Lancets 30G MISC 1 each by Does not apply route daily Pt uses Clear Choice Glucose Meter.  100 each 0    glucose monitoring kit (FREESTYLE) monitoring kit 1 kit by Does not apply route daily Check sugars once a day 1 kit 0    atorvastatin (LIPITOR) 20 MG tablet TAKE ONE TABLET BY MOUTH DAILY 90 tablet 0    lisinopril (PRINIVIL;ZESTRIL) 5 MG tablet Take 0.5 tablets by mouth daily 15 tablet 5    metFORMIN (GLUCOPHAGE-XR) 500 MG extended release tablet Take 2 tablets by mouth daily (with breakfast) 60 tablet 5    donepezil (ARICEPT) 10 MG tablet Take 1 tablet by mouth nightly 90 tablet 3    Elastic Bandages & Supports (B & B ELASTIC ANKLE BRACE) MISC Use daily 1 each 0    FLUoxetine (PROZAC) 40 MG capsule Take 1 capsule by mouth daily 90 capsule 3    amLODIPine (NORVASC) 10 MG tablet Take 1 tablet by mouth daily 90 tablet 3    hydrocortisone 2.5 % ointment APPLY TOPICALLY 2 TIMES DAILY 454 g 0    tamsulosin (FLOMAX) 0.4 MG capsule TAKE ONE CAPSULE BY MOUTH DAILY 30 MINUTES AFTER EVENING MEAL  4    Blood Pressure Monitor LARY Use daily to check blood pressure 1 Device 0    Glucose Blood (GLUCOSE METER TEST VI) 1 each by In Vitro route daily      Calcium Citrate-Vitamin D (CALCET CREAMY BITES) 500-400 MG-UNIT CHEW tablet Take 1 tablet by mouth daily      vitamin D (CHOLECALCIFEROL) 1000 UNIT TABS tablet Take 1,000 Units by mouth daily      vitamin B-12 (CYANOCOBALAMIN) 50 MCG tablet Take 50 mcg by mouth daily      ferrous gluconate (FERGON) 240 (27 Fe) MG tablet Take 240 mg by mouth 3 times daily (with meals)      aspirin 81 MG EC tablet Take 81 mg by mouth daily. No current facility-administered medications on file prior to visit. No Known Allergies    Past medical, surgical, socialand/or family history reviewed, updated as needed, and are non-contributory (unless otherwise stated). Medications, allergies, and problem list also reviewed and updated as needed in patient's record. Wt Readings from Last 3 Encounters:   08/10/20 253 lb (114.8 kg)   07/13/20 257 lb 1.6 oz (116.6 kg)   06/10/20 255 lb (115.7 kg)                   /78 (Site: Right Upper Arm, Position: Sitting)   Pulse 76   Resp 18   Ht 5' 11\" (1.803 m)   Wt 253 lb (114.8 kg)   SpO2 97%   BMI 35.29 kg/m²        Physical Exam  Vitals signs and nursing note reviewed. Constitutional:       General: He is not in acute distress. Appearance: He is well-developed. He is not diaphoretic. HENT:      Head: Normocephalic and atraumatic. Cardiovascular:      Rate and Rhythm: Normal rate and regular rhythm. Heart sounds: Normal heart sounds. No murmur. Pulmonary:      Effort: Pulmonary effort is normal. No respiratory distress. Breath sounds: Normal breath sounds. No wheezing or rales. Abdominal:      General: Bowel sounds are normal. There is no distension. Tenderness: There is no abdominal tenderness. There is no guarding or rebound. Musculoskeletal: Normal range of motion. General: Swelling (trace) present. Comments: Full ROM of left shoulder. Strength 5/5  Upper extremity. Results for orders placed or performed in visit on 08/10/20   POCT glycosylated hemoglobin (Hb A1C)   Result Value Ref Range    Hemoglobin A1C 7.1 %       ASSESSMENT/PLAN  Kayli Bentley was seen today for diabetes. Diagnoses and all orders for this visit:    Type 2 diabetes mellitus without complication, without long-term current use of insulin ([facility])  -     POCT glycosylated hemoglobin (Hb A1C)  - Well controlled. Continue current meds. - Currently on statin and ace inhibitor     Acute pain of left shoulder  -     External Referral To Physical Therapy  -     cyclobenzaprine (FLEXERIL) 5 MG tablet; Take 1 tablet by mouth 2 times daily as needed for Muscle spasms    Dementia without behavioral disturbance, unspecified dementia type ([facility] [address]   - Saw neurology. Will get records     Small vessel disease ([facility] [address]   - See dementia plan       Phone/MyChart follow up if tests abnormal.    Return in about 3 months (around 11/10/2020) for diabetes . or sooner if necessary. I have reviewed myfindings and recommendations with Kayli Bentley. Latia Lynch.  Sheryle Core, M.D

## 2020-08-17 PROBLEM — E11.9 TYPE 2 DIABETES MELLITUS WITHOUT COMPLICATION, WITHOUT LONG-TERM CURRENT USE OF INSULIN (HCC): Status: ACTIVE | Noted: 2020-08-17

## 2020-09-08 RX ORDER — ATORVASTATIN CALCIUM 20 MG/1
TABLET, FILM COATED ORAL
Qty: 90 TABLET | Refills: 0 | Status: SHIPPED
Start: 2020-09-08 | End: 2020-11-16 | Stop reason: SDUPTHER

## 2020-09-08 NOTE — TELEPHONE ENCOUNTER
Rec'd vm msg following up on refill request.     Last seen 8/10/2020  Next appt 11/16/2020  [facility]

## 2020-10-01 ENCOUNTER — OFFICE VISIT (OUTPATIENT)
Dept: PODIATRY | Age: 70
End: 2020-10-01
Payer: MEDICARE

## 2020-10-01 VITALS — WEIGHT: 235 LBS | BODY MASS INDEX: 32.9 KG/M2 | HEIGHT: 71 IN

## 2020-10-01 PROCEDURE — 2022F DILAT RTA XM EVC RTNOPTHY: CPT | Performed by: PODIATRIST

## 2020-10-01 PROCEDURE — 1123F ACP DISCUSS/DSCN MKR DOCD: CPT | Performed by: PODIATRIST

## 2020-10-01 PROCEDURE — 3051F HG A1C>EQUAL 7.0%<8.0%: CPT | Performed by: PODIATRIST

## 2020-10-01 PROCEDURE — G8484 FLU IMMUNIZE NO ADMIN: HCPCS | Performed by: PODIATRIST

## 2020-10-01 PROCEDURE — 3017F COLORECTAL CA SCREEN DOC REV: CPT | Performed by: PODIATRIST

## 2020-10-01 PROCEDURE — 4040F PNEUMOC VAC/ADMIN/RCVD: CPT | Performed by: PODIATRIST

## 2020-10-01 PROCEDURE — G8427 DOCREV CUR MEDS BY ELIG CLIN: HCPCS | Performed by: PODIATRIST

## 2020-10-01 PROCEDURE — 99213 OFFICE O/P EST LOW 20 MIN: CPT | Performed by: PODIATRIST

## 2020-10-01 PROCEDURE — G8417 CALC BMI ABV UP PARAM F/U: HCPCS | Performed by: PODIATRIST

## 2020-10-01 PROCEDURE — 1036F TOBACCO NON-USER: CPT | Performed by: PODIATRIST

## 2020-10-01 NOTE — PROGRESS NOTES
Patient is in today for evaluation of right foot. Patient had stepped on a nail 2 weeks ago. Patient also has his great toenail that is discolored. pcp is Darryn Castellano MD

## 2020-10-02 PROBLEM — S91.331A PUNCTURE WOUND OF RIGHT FOOT: Status: ACTIVE | Noted: 2020-10-02

## 2020-10-02 PROBLEM — E11.51 TYPE II DIABETES MELLITUS WITH PERIPHERAL CIRCULATORY DISORDER (HCC): Status: ACTIVE | Noted: 2020-10-02

## 2020-10-02 NOTE — PROGRESS NOTES
10/2/20     Fidencio Dias    : 1950   Sex: male    Age: 72 y.o. Patient's PCP/Provider is:  Landry Cadet. MD Jose    Subjective:  Patient is seen today for evaluation regarding new onset puncture wound he sustained to the plantar aspect of his right foot. He was helping his son-in-law restore at home and stepped on a priscila nail approximately 4 to 5 days ago. He presented today for further evaluation and care. He denies any nausea, vomiting, fever, chills. He has been cleansing and applying topical medication to the area over the last several days. No other additional abnormalities noted at this time. Chief Complaint   Patient presents with    Foot Injury     right foot       ROS:  Const: Positives and pertinent negatives as per HPI. Musculo: Denies symptoms other than stated above. Neuro: Denies symptoms other than stated above. Skin: Denies symptoms other than stated above.     Current Medications:    Current Outpatient Medications:     atorvastatin (LIPITOR) 20 MG tablet, TAKE ONE TABLET BY MOUTH DAILY, Disp: 90 tablet, Rfl: 0    blood glucose test strips (ASCENSIA AUTODISC VI;ONE TOUCH ULTRA TEST VI) strip, Indications: pt uses Clear Choice Glucose Meter Check sugars daily, Disp: 100 each, Rfl: 0    Lancets 30G MISC, 1 each by Does not apply route daily Pt uses Clear Choice Glucose Meter., Disp: 100 each, Rfl: 0    glucose monitoring kit (FREESTYLE) monitoring kit, 1 kit by Does not apply route daily Check sugars once a day, Disp: 1 kit, Rfl: 0    lisinopril (PRINIVIL;ZESTRIL) 5 MG tablet, Take 0.5 tablets by mouth daily, Disp: 15 tablet, Rfl: 5    metFORMIN (GLUCOPHAGE-XR) 500 MG extended release tablet, Take 2 tablets by mouth daily (with breakfast), Disp: 60 tablet, Rfl: 5    donepezil (ARICEPT) 10 MG tablet, Take 1 tablet by mouth nightly, Disp: 90 tablet, Rfl: 3    Elastic Bandages & Supports (B & B ELASTIC ANKLE BRACE) MISC, Use daily, Disp: 1 each, Rfl: 0    FLUoxetine (PROZAC) 40 MG capsule, Take 1 capsule by mouth daily, Disp: 90 capsule, Rfl: 3    amLODIPine (NORVASC) 10 MG tablet, Take 1 tablet by mouth daily, Disp: 90 tablet, Rfl: 3    hydrocortisone 2.5 % ointment, APPLY TOPICALLY 2 TIMES DAILY, Disp: 454 g, Rfl: 0    tamsulosin (FLOMAX) 0.4 MG capsule, TAKE ONE CAPSULE BY MOUTH DAILY 30 MINUTES AFTER EVENING MEAL, Disp: , Rfl: 4    Blood Pressure Monitor LARY, Use daily to check blood pressure, Disp: 1 Device, Rfl: 0    Glucose Blood (GLUCOSE METER TEST VI), 1 each by In Vitro route daily, Disp: , Rfl:     Calcium Citrate-Vitamin D (CALCET CREAMY BITES) 500-400 MG-UNIT CHEW tablet, Take 1 tablet by mouth daily, Disp: , Rfl:     vitamin D (CHOLECALCIFEROL) 1000 UNIT TABS tablet, Take 1,000 Units by mouth daily, Disp: , Rfl:     vitamin B-12 (CYANOCOBALAMIN) 50 MCG tablet, Take 50 mcg by mouth daily, Disp: , Rfl:     ferrous gluconate (FERGON) 240 (27 Fe) MG tablet, Take 240 mg by mouth 3 times daily (with meals), Disp: , Rfl:     aspirin 81 MG EC tablet, Take 81 mg by mouth daily. , Disp: , Rfl:     Allergies:  No Known Allergies    Vitals:    10/01/20 1406   Weight: 235 lb (106.6 kg)   Height: 5' 11\" (1.803 m)       Exam:  Neurovascular status unchanged. Puncture wound site noted plantar aspect right third MTPJ region measuring approximately 0.3 cm in diameter with minimal depth noted. No underlying abscess, erythema, edema, ecchymosis, or any signs of infection noted right foot. Tenderness noted to palpation puncture wound site right foot. Adequate range of motion lesser digits right foot. Diagnostic Studies:     No results found. Procedures:    None    Plan Per Assessment  Crescencio blaine was seen today for foot injury. Diagnoses and all orders for this visit:    Puncture wound of right foot, initial encounter    Type II diabetes mellitus with peripheral circulatory disorder ([facility] [address]      1. Evaluation and management  2.  We did advised the patient and his

## 2020-10-08 ENCOUNTER — OFFICE VISIT (OUTPATIENT)
Dept: PODIATRY | Age: 70
End: 2020-10-08
Payer: MEDICARE

## 2020-10-08 VITALS — BODY MASS INDEX: 32.9 KG/M2 | WEIGHT: 235 LBS | HEIGHT: 71 IN

## 2020-10-08 PROCEDURE — 2022F DILAT RTA XM EVC RTNOPTHY: CPT | Performed by: PODIATRIST

## 2020-10-08 PROCEDURE — 1036F TOBACCO NON-USER: CPT | Performed by: PODIATRIST

## 2020-10-08 PROCEDURE — G8417 CALC BMI ABV UP PARAM F/U: HCPCS | Performed by: PODIATRIST

## 2020-10-08 PROCEDURE — 1123F ACP DISCUSS/DSCN MKR DOCD: CPT | Performed by: PODIATRIST

## 2020-10-08 PROCEDURE — G8484 FLU IMMUNIZE NO ADMIN: HCPCS | Performed by: PODIATRIST

## 2020-10-08 PROCEDURE — 99213 OFFICE O/P EST LOW 20 MIN: CPT | Performed by: PODIATRIST

## 2020-10-08 PROCEDURE — 4040F PNEUMOC VAC/ADMIN/RCVD: CPT | Performed by: PODIATRIST

## 2020-10-08 PROCEDURE — G8427 DOCREV CUR MEDS BY ELIG CLIN: HCPCS | Performed by: PODIATRIST

## 2020-10-08 PROCEDURE — 3017F COLORECTAL CA SCREEN DOC REV: CPT | Performed by: PODIATRIST

## 2020-10-08 PROCEDURE — 3051F HG A1C>EQUAL 7.0%<8.0%: CPT | Performed by: PODIATRIST

## 2020-10-08 NOTE — PROGRESS NOTES
Rfl: 3    hydrocortisone 2.5 % ointment, APPLY TOPICALLY 2 TIMES DAILY, Disp: 454 g, Rfl: 0    tamsulosin (FLOMAX) 0.4 MG capsule, TAKE ONE CAPSULE BY MOUTH DAILY 30 MINUTES AFTER EVENING MEAL, Disp: , Rfl: 4    Blood Pressure Monitor LARY, Use daily to check blood pressure, Disp: 1 Device, Rfl: 0    Glucose Blood (GLUCOSE METER TEST VI), 1 each by In Vitro route daily, Disp: , Rfl:     Calcium Citrate-Vitamin D (CALCET CREAMY BITES) 500-400 MG-UNIT CHEW tablet, Take 1 tablet by mouth daily, Disp: , Rfl:     vitamin D (CHOLECALCIFEROL) 1000 UNIT TABS tablet, Take 1,000 Units by mouth daily, Disp: , Rfl:     vitamin B-12 (CYANOCOBALAMIN) 50 MCG tablet, Take 50 mcg by mouth daily, Disp: , Rfl:     ferrous gluconate (FERGON) 240 (27 Fe) MG tablet, Take 240 mg by mouth 3 times daily (with meals), Disp: , Rfl:     aspirin 81 MG EC tablet, Take 81 mg by mouth daily. , Disp: , Rfl:     Allergies:  No Known Allergies    Vitals:    10/08/20 0948   Weight: 235 lb (106.6 kg)   Height: 5' 11\" (1.803 m)       Exam:  Neurovascular status unchanged. Puncture wound site is stable plantar aspect right second MTPJ region measuring approximately 0.3 cm in diameter with a depth of 0.2 cm noted. No purulence, odor, erythema or any signs of infection noted right foot. Diagnostic Studies:     No results found. Procedures:    None    Plan Per Assessment  Elmo Harrell was seen today for wound check. Diagnoses and all orders for this visit:    Puncture wound of right foot, subsequent encounter    Type II diabetes mellitus with peripheral circulatory disorder ([facility] [address]      1. Evaluation and management  2. We did discuss continued use of the topical alginate silver dressing to be whipped into the puncture wound site and changed on a daily basis. 3. Patient is to continue with his oral antibiotic course as prescribed. He is to continue with his offloading diabetic shoe gear at this time as well.   4. Will be followed up in 1 week's time or sooner if needed for continued evaluation and management. Advised to call the office with any questions or concerns in the interim. Seen By:    Katja Walls DPM    Electronically signed by Katja Walls DPM on 10/8/2020 at 11:07 AM    This note was created using voice recognition software. The note was reviewed however may contain grammatical errors.

## 2020-10-08 NOTE — PROGRESS NOTES
Patient is in today for 1 week right foot wound follow up. Patient has no concerns at this time. pcp is Darryn Ewing MD

## 2020-10-15 ENCOUNTER — OFFICE VISIT (OUTPATIENT)
Dept: PODIATRY | Age: 70
End: 2020-10-15
Payer: MEDICARE

## 2020-10-15 VITALS — WEIGHT: 235 LBS | HEIGHT: 71 IN | BODY MASS INDEX: 32.9 KG/M2

## 2020-10-15 PROCEDURE — G8417 CALC BMI ABV UP PARAM F/U: HCPCS | Performed by: PODIATRIST

## 2020-10-15 PROCEDURE — 3017F COLORECTAL CA SCREEN DOC REV: CPT | Performed by: PODIATRIST

## 2020-10-15 PROCEDURE — G8427 DOCREV CUR MEDS BY ELIG CLIN: HCPCS | Performed by: PODIATRIST

## 2020-10-15 PROCEDURE — G8484 FLU IMMUNIZE NO ADMIN: HCPCS | Performed by: PODIATRIST

## 2020-10-15 PROCEDURE — 3051F HG A1C>EQUAL 7.0%<8.0%: CPT | Performed by: PODIATRIST

## 2020-10-15 PROCEDURE — 1036F TOBACCO NON-USER: CPT | Performed by: PODIATRIST

## 2020-10-15 PROCEDURE — 2022F DILAT RTA XM EVC RTNOPTHY: CPT | Performed by: PODIATRIST

## 2020-10-15 PROCEDURE — 99213 OFFICE O/P EST LOW 20 MIN: CPT | Performed by: PODIATRIST

## 2020-10-15 PROCEDURE — 4040F PNEUMOC VAC/ADMIN/RCVD: CPT | Performed by: PODIATRIST

## 2020-10-15 PROCEDURE — 1123F ACP DISCUSS/DSCN MKR DOCD: CPT | Performed by: PODIATRIST

## 2020-10-15 NOTE — PROGRESS NOTES
10/15/20     Nakul Don    : 1950   Sex: male    Age: 72 y.o. Patient's PCP/Provider is:  Milana Tinsley. MD Jose    Subjective:  Patient is seen today for follow-up regarding continued treatment puncture wound right foot. Overall patient is doing well at this time without any additional issues noted. He denies any nausea, vomiting, fever, chills. He has been applying the topical medication as instructed without issues. Chief Complaint   Patient presents with    Wound Check     right foot       ROS:  Const: Positives and pertinent negatives as per HPI. Musculo: Denies symptoms other than stated above. Neuro: Denies symptoms other than stated above. Skin: Denies symptoms other than stated above.     Current Medications:    Current Outpatient Medications:     atorvastatin (LIPITOR) 20 MG tablet, TAKE ONE TABLET BY MOUTH DAILY, Disp: 90 tablet, Rfl: 0    blood glucose test strips (ASCENSIA AUTODISC VI;ONE TOUCH ULTRA TEST VI) strip, Indications: pt uses Clear Choice Glucose Meter Check sugars daily, Disp: 100 each, Rfl: 0    Lancets 30G MISC, 1 each by Does not apply route daily Pt uses Clear Choice Glucose Meter., Disp: 100 each, Rfl: 0    glucose monitoring kit (FREESTYLE) monitoring kit, 1 kit by Does not apply route daily Check sugars once a day, Disp: 1 kit, Rfl: 0    lisinopril (PRINIVIL;ZESTRIL) 5 MG tablet, Take 0.5 tablets by mouth daily, Disp: 15 tablet, Rfl: 5    metFORMIN (GLUCOPHAGE-XR) 500 MG extended release tablet, Take 2 tablets by mouth daily (with breakfast), Disp: 60 tablet, Rfl: 5    donepezil (ARICEPT) 10 MG tablet, Take 1 tablet by mouth nightly, Disp: 90 tablet, Rfl: 3    Elastic Bandages & Supports (B & B ELASTIC ANKLE BRACE) MISC, Use daily, Disp: 1 each, Rfl: 0    FLUoxetine (PROZAC) 40 MG capsule, Take 1 capsule by mouth daily, Disp: 90 capsule, Rfl: 3    amLODIPine (NORVASC) 10 MG tablet, Take 1 tablet by mouth daily, Disp: 90 tablet, Rfl: 3    hydrocortisone 2.5 % ointment, APPLY TOPICALLY 2 TIMES DAILY, Disp: 454 g, Rfl: 0    tamsulosin (FLOMAX) 0.4 MG capsule, TAKE ONE CAPSULE BY MOUTH DAILY 30 MINUTES AFTER EVENING MEAL, Disp: , Rfl: 4    Blood Pressure Monitor LARY, Use daily to check blood pressure, Disp: 1 Device, Rfl: 0    Glucose Blood (GLUCOSE METER TEST VI), 1 each by In Vitro route daily, Disp: , Rfl:     Calcium Citrate-Vitamin D (CALCET CREAMY BITES) 500-400 MG-UNIT CHEW tablet, Take 1 tablet by mouth daily, Disp: , Rfl:     vitamin D (CHOLECALCIFEROL) 1000 UNIT TABS tablet, Take 1,000 Units by mouth daily, Disp: , Rfl:     vitamin B-12 (CYANOCOBALAMIN) 50 MCG tablet, Take 50 mcg by mouth daily, Disp: , Rfl:     ferrous gluconate (FERGON) 240 (27 Fe) MG tablet, Take 240 mg by mouth 3 times daily (with meals), Disp: , Rfl:     aspirin 81 MG EC tablet, Take 81 mg by mouth daily. , Disp: , Rfl:     Allergies:  No Known Allergies    Vitals:    10/15/20 1405   Weight: 235 lb (106.6 kg)   Height: 5' 11\" (1.803 m)       Exam:  Neurovascular status unchanged. Puncture wound site is essentially healed plantar aspect right forefoot region. No tenderness noted to palpation. No signs of infection noted right foot. No other additional abnormalities noted at this time. Diagnostic Studies:     No results found. Procedures:    None    Plan Per Assessment  Nikunj Bourne was seen today for wound check. Diagnoses and all orders for this visit:    Puncture wound of right foot, subsequent encounter    Type II diabetes mellitus with peripheral circulatory disorder ([facility] [address]      1. Evaluation and management  2. We did properly evaluate the puncture wound site right foot. We did advise appropriate skin care techniques to allow for complete soft tissue healing. 3. We did recommend use of his good supportive shoe gear and diabetic insoles. He did discuss additional diabetic foot care techniques with patient in detail today.   4. Patient will be followed up at a later (0) indicator not present

## 2020-10-15 NOTE — PROGRESS NOTES
Patient is in today for 1 week right foot wound. Patient is also questioning his right great toe being black. pcp is Darryn Bourne MD

## 2020-10-19 RX ORDER — BLOOD SUGAR DIAGNOSTIC
STRIP MISCELLANEOUS
Qty: 100 EACH | Refills: 0 | Status: SHIPPED
Start: 2020-10-19 | End: 2020-10-22 | Stop reason: SDUPTHER

## 2020-10-22 RX ORDER — BLOOD SUGAR DIAGNOSTIC
STRIP MISCELLANEOUS
Qty: 100 EACH | Refills: 0 | Status: SHIPPED
Start: 2020-10-22 | End: 2021-02-01 | Stop reason: SDUPTHER

## 2020-11-16 ENCOUNTER — OFFICE VISIT (OUTPATIENT)
Dept: FAMILY MEDICINE CLINIC | Age: 70
End: 2020-11-16
Payer: MEDICARE

## 2020-11-16 VITALS
OXYGEN SATURATION: 98 % | WEIGHT: 254.7 LBS | RESPIRATION RATE: 20 BRPM | SYSTOLIC BLOOD PRESSURE: 118 MMHG | HEART RATE: 88 BPM | DIASTOLIC BLOOD PRESSURE: 66 MMHG | HEIGHT: 71 IN | BODY MASS INDEX: 35.66 KG/M2 | TEMPERATURE: 97 F

## 2020-11-16 LAB — HBA1C MFR BLD: 7.3 %

## 2020-11-16 PROCEDURE — 3017F COLORECTAL CA SCREEN DOC REV: CPT | Performed by: FAMILY MEDICINE

## 2020-11-16 PROCEDURE — G8417 CALC BMI ABV UP PARAM F/U: HCPCS | Performed by: FAMILY MEDICINE

## 2020-11-16 PROCEDURE — G8427 DOCREV CUR MEDS BY ELIG CLIN: HCPCS | Performed by: FAMILY MEDICINE

## 2020-11-16 PROCEDURE — 3288F FALL RISK ASSESSMENT DOCD: CPT | Performed by: FAMILY MEDICINE

## 2020-11-16 PROCEDURE — 4040F PNEUMOC VAC/ADMIN/RCVD: CPT | Performed by: FAMILY MEDICINE

## 2020-11-16 PROCEDURE — 2022F DILAT RTA XM EVC RTNOPTHY: CPT | Performed by: FAMILY MEDICINE

## 2020-11-16 PROCEDURE — 99213 OFFICE O/P EST LOW 20 MIN: CPT | Performed by: FAMILY MEDICINE

## 2020-11-16 PROCEDURE — G8482 FLU IMMUNIZE ORDER/ADMIN: HCPCS | Performed by: FAMILY MEDICINE

## 2020-11-16 PROCEDURE — 3051F HG A1C>EQUAL 7.0%<8.0%: CPT | Performed by: FAMILY MEDICINE

## 2020-11-16 PROCEDURE — 1123F ACP DISCUSS/DSCN MKR DOCD: CPT | Performed by: FAMILY MEDICINE

## 2020-11-16 PROCEDURE — 1036F TOBACCO NON-USER: CPT | Performed by: FAMILY MEDICINE

## 2020-11-16 PROCEDURE — 83036 HEMOGLOBIN GLYCOSYLATED A1C: CPT | Performed by: FAMILY MEDICINE

## 2020-11-16 RX ORDER — ATORVASTATIN CALCIUM 20 MG/1
TABLET, FILM COATED ORAL
Qty: 90 TABLET | Refills: 5 | Status: SHIPPED
Start: 2020-11-16 | End: 2021-12-06

## 2020-11-16 ASSESSMENT — ENCOUNTER SYMPTOMS
VOMITING: 0
ABDOMINAL PAIN: 0
DIARRHEA: 0
NAUSEA: 0
SHORTNESS OF BREATH: 0
WHEEZING: 0
COUGH: 0

## 2020-11-16 NOTE — PROGRESS NOTES
[address] [facility]  [phone]   Tessa Arreguin MD     Patient: Fidencio Dias  YOB: 1950  Visit Date: 11/16/20    Arely Ames is a 72y.o. year old male here today for   Chief Complaint   Patient presents with    3 Month Follow-Up     Type 2 diabetes review home glucose log       HPI  Patient is a 72year old male here for follow up of diabetes, left shoulder pain. Left shoulder pain is improved. Did not do physical therapy. But has been improving. Diabetes   Saw foot doctor after he stepped on a nail that went through his shoe. Had eyes checked. It is all healed up now. Sugars between 120-150 . Never over 200. Lowest 118. Denies  polyphagia. Does have some polyuria but drinks a lot of crystal light and coffee. Denies diarrhea. Has one bowel movement a day. Having a rash around his shoulders that is itchy. No redness , swelling. Review of Systems   Constitutional: Negative for chills and fever. Respiratory: Negative for cough, shortness of breath and wheezing. Cardiovascular: Negative for chest pain, palpitations and leg swelling. Gastrointestinal: Negative for abdominal pain, diarrhea, nausea and vomiting. Musculoskeletal: Positive for arthralgias. Neurological: Negative for dizziness and light-headedness. Current Outpatient Medications on File Prior to Visit   Medication Sig Dispense Refill    blood glucose test strips (ONETOUCH VERIO) strip USE TO CHECK BLOOD SUGAR DAILY 100 each 0    Lancets 30G MISC 1 each by Does not apply route daily Pt uses Clear Choice Glucose Meter.  100 each 0    glucose monitoring kit (FREESTYLE) monitoring kit 1 kit by Does not apply route daily Check sugars once a day 1 kit 0    metFORMIN (GLUCOPHAGE-XR) 500 MG extended release tablet Take 2 tablets by mouth daily (with breakfast) 60 tablet 5    donepezil (ARICEPT) 10 MG tablet Take 1 tablet by mouth nightly 90 tablet 3    FLUoxetine (PROZAC) 40 MG capsule Take 1 capsule by mouth daily 90 capsule 3    amLODIPine (NORVASC) 10 MG tablet Take 1 tablet by mouth daily 90 tablet 3    hydrocortisone 2.5 % ointment APPLY TOPICALLY 2 TIMES DAILY 454 g 0    Blood Pressure Monitor LARY Use daily to check blood pressure 1 Device 0    Glucose Blood (GLUCOSE METER TEST VI) 1 each by In Vitro route daily      Calcium Citrate-Vitamin D (CALCET CREAMY BITES) 500-400 MG-UNIT CHEW tablet Take 1 tablet by mouth daily      vitamin D (CHOLECALCIFEROL) 1000 UNIT TABS tablet Take 1,000 Units by mouth daily      vitamin B-12 (CYANOCOBALAMIN) 50 MCG tablet Take 50 mcg by mouth daily      ferrous gluconate (FERGON) 240 (27 Fe) MG tablet Take 240 mg by mouth 3 times daily (with meals)      aspirin 81 MG EC tablet Take 81 mg by mouth daily. No current facility-administered medications on file prior to visit. No Known Allergies    Past medical, surgical, socialand/or family history reviewed, updated as needed, and are non-contributory (unless otherwise stated). Medications, allergies, and problem list also reviewed and updated as needed in patient's record. Wt Readings from Last 3 Encounters:   11/16/20 254 lb 11.2 oz (115.5 kg)   10/15/20 235 lb (106.6 kg)   10/08/20 235 lb (106.6 kg)                   /66   Pulse 88   Temp 97 °F (36.1 °C) (Temporal)   Resp 20   Ht 5' 11\" (1.803 m)   Wt 254 lb 11.2 oz (115.5 kg)   SpO2 98%   BMI 35.52 kg/m²        Physical Exam  Vitals signs and nursing note reviewed. Constitutional:       General: He is not in acute distress. Appearance: He is well-developed. He is not diaphoretic. HENT:      Head: Normocephalic and atraumatic. Cardiovascular:      Rate and Rhythm: Normal rate and regular rhythm. Heart sounds: Normal heart sounds. No murmur. Pulmonary:      Effort: Pulmonary effort is normal. No respiratory distress. Breath sounds: Normal breath sounds.  No wheezing or rales.   Abdominal:      General: Bowel sounds are normal. There is no distension. Tenderness: There is no abdominal tenderness. There is no guarding or rebound. Musculoskeletal: Normal range of motion. General: Swelling (trace) present. Skin:     Comments: Dry skin around upper back, shoulders         Results for orders placed or performed in visit on 11/16/20   POCT glycosylated hemoglobin (Hb A1C)   Result Value Ref Range    Hemoglobin A1C 7.3 %       ASSESSMENT/PLAN  Santa Shin was seen today for 3 month follow-up. Diagnoses and all orders for this visit:    Type 2 diabetes mellitus without complication, without long-term current use of insulin ([facility])  -     POCT glycosylated hemoglobin (Hb A1C)  -     atorvastatin (LIPITOR) 20 MG tablet; TAKE ONE TABLET BY MOUTH DAILY    well controlled. Continue current meds. Phone/MyChart follow up if tests abnormal.    Return in about 3 months (around 2/16/2021) for diabetes . or sooner if necessary. I have reviewed myfindings and recommendations with Hettick Aleida. Norma Vargas.  Cory Dumont M.D

## 2020-11-18 RX ORDER — LISINOPRIL 5 MG/1
2.5 TABLET ORAL DAILY
Qty: 45 TABLET | Refills: 1 | Status: SHIPPED
Start: 2020-11-18 | End: 2021-06-14 | Stop reason: SDUPTHER

## 2020-12-22 RX ORDER — METFORMIN HYDROCHLORIDE 500 MG/1
TABLET, EXTENDED RELEASE ORAL
Qty: 60 TABLET | Refills: 5 | Status: SHIPPED
Start: 2020-12-22 | End: 2021-06-14 | Stop reason: SDUPTHER

## 2021-01-06 ENCOUNTER — OFFICE VISIT (OUTPATIENT)
Dept: PODIATRY | Age: 71
End: 2021-01-06
Payer: MEDICARE

## 2021-01-06 VITALS — WEIGHT: 254 LBS | HEIGHT: 71 IN | BODY MASS INDEX: 35.56 KG/M2

## 2021-01-06 DIAGNOSIS — M76.61 TENDONITIS, ACHILLES, RIGHT: Primary | ICD-10-CM

## 2021-01-06 DIAGNOSIS — R60.0 LOCALIZED EDEMA: ICD-10-CM

## 2021-01-06 DIAGNOSIS — M79.671 RIGHT FOOT PAIN: ICD-10-CM

## 2021-01-06 DIAGNOSIS — M89.8X7 RETROCALCANEAL EXOSTOSIS: ICD-10-CM

## 2021-01-06 PROCEDURE — 29580 STRAPPING UNNA BOOT: CPT | Performed by: PODIATRIST

## 2021-01-06 PROCEDURE — G8417 CALC BMI ABV UP PARAM F/U: HCPCS | Performed by: PODIATRIST

## 2021-01-06 PROCEDURE — 4040F PNEUMOC VAC/ADMIN/RCVD: CPT | Performed by: PODIATRIST

## 2021-01-06 PROCEDURE — G8427 DOCREV CUR MEDS BY ELIG CLIN: HCPCS | Performed by: PODIATRIST

## 2021-01-06 PROCEDURE — 99213 OFFICE O/P EST LOW 20 MIN: CPT | Performed by: PODIATRIST

## 2021-01-06 PROCEDURE — 1123F ACP DISCUSS/DSCN MKR DOCD: CPT | Performed by: PODIATRIST

## 2021-01-06 PROCEDURE — 3017F COLORECTAL CA SCREEN DOC REV: CPT | Performed by: PODIATRIST

## 2021-01-06 PROCEDURE — G8482 FLU IMMUNIZE ORDER/ADMIN: HCPCS | Performed by: PODIATRIST

## 2021-01-06 PROCEDURE — 1036F TOBACCO NON-USER: CPT | Performed by: PODIATRIST

## 2021-01-06 NOTE — PROGRESS NOTES
21     Melani Schreiber    : 1950   Sex: male    Age: 72 y.o. Patient's PCP/Provider is:  Marino Hidalgo. MD Jose    Subjective:  Patient is seen today for evaluation regarding recalcitrant pain into the posterior Achilles insertional area right lower extremity. Patient was concerned about some increased swelling and pain to the posterior right heel with everyday activities. Patient used to get periodic symptoms, but over the last 2 to 3 months the symptoms have becoming progressively worse. He does have difficulty wearing shoe gear at this time comfortably and walking for longer distances. He denies any recent injury to the right lower extremity. No other abnormalities noted at this time. Chief Complaint   Patient presents with    Foot Pain     right       ROS:  Const: Positives and pertinent negatives as per HPI. Musculo: Denies symptoms other than stated above. Neuro: Denies symptoms other than stated above. Skin: Denies symptoms other than stated above.     Current Medications:    Current Outpatient Medications:     metFORMIN (GLUCOPHAGE-XR) 500 MG extended release tablet, TAKE TWO TABLETS BY MOUTH EVERY DAY WITH BREAKFAST, Disp: 60 tablet, Rfl: 5    lisinopril (PRINIVIL;ZESTRIL) 5 MG tablet, Take 0.5 tablets by mouth daily, Disp: 45 tablet, Rfl: 1    atorvastatin (LIPITOR) 20 MG tablet, TAKE ONE TABLET BY MOUTH DAILY, Disp: 90 tablet, Rfl: 5    blood glucose test strips (ONETOUCH VERIO) strip, USE TO CHECK BLOOD SUGAR DAILY, Disp: 100 each, Rfl: 0    Lancets 30G MISC, 1 each by Does not apply route daily Pt uses Clear Choice Glucose Meter., Disp: 100 each, Rfl: 0    glucose monitoring kit (FREESTYLE) monitoring kit, 1 kit by Does not apply route daily Check sugars once a day, Disp: 1 kit, Rfl: 0    donepezil (ARICEPT) 10 MG tablet, Take 1 tablet by mouth nightly, Disp: 90 tablet, Rfl: 3    FLUoxetine (PROZAC) 40 MG capsule, Take 1 capsule by mouth daily, Disp: 90 capsule, Rfl: 3   amLODIPine (NORVASC) 10 MG tablet, Take 1 tablet by mouth daily, Disp: 90 tablet, Rfl: 3    hydrocortisone 2.5 % ointment, APPLY TOPICALLY 2 TIMES DAILY, Disp: 454 g, Rfl: 0    Blood Pressure Monitor LARY, Use daily to check blood pressure, Disp: 1 Device, Rfl: 0    Glucose Blood (GLUCOSE METER TEST VI), 1 each by In Vitro route daily, Disp: , Rfl:     Calcium Citrate-Vitamin D (CALCET CREAMY BITES) 500-400 MG-UNIT CHEW tablet, Take 1 tablet by mouth daily, Disp: , Rfl:     vitamin D (CHOLECALCIFEROL) 1000 UNIT TABS tablet, Take 1,000 Units by mouth daily, Disp: , Rfl:     vitamin B-12 (CYANOCOBALAMIN) 50 MCG tablet, Take 50 mcg by mouth daily, Disp: , Rfl:     ferrous gluconate (FERGON) 240 (27 Fe) MG tablet, Take 240 mg by mouth 3 times daily (with meals), Disp: , Rfl:     aspirin 81 MG EC tablet, Take 81 mg by mouth daily. , Disp: , Rfl:     Allergies:  No Known Allergies    Vitals:    01/06/21 1332   Weight: 254 lb (115.2 kg)   Height: 5' 11\" (1.803 m)       Exam:  VASCULAR: Pedal pulses palpable right foot. Capillary fill time brisk digits 1 through 5 right foot. NEUROLOGICAL: Epicritic sensations intact right foot. DERMATOLOGICAL: Localized edematous issues noted to the posterior right heel region. No ecchymotic skin changes present right foot. MUSCULOSKELETAL: Tenderness noted to palpation distal Achilles insertional area right heel. Range of motion does elicit discomfort along the distal Achilles tendon. No palpable defect noted along the course of the Achilles tendon right lower extremity. Diagnostic Studies:     Radiographs were reviewed with patient in detail today. They did reveal retrocalcaneal exostosis as well as an intratendinous ossification distal Achilles tendon.       Procedures:

## 2021-01-06 NOTE — PROGRESS NOTES
Patient here for right heel/ankle spur. Patient noticed about a year ago, was not as severe, but has gotten worse in time. Patient states that is causes pain while walking. Last ov with pcp was 11/16/2020 w/Darryn Garcia MD      Electronically signed by Wesley Bellamy MA on 1/6/2021 at 1:31 PM

## 2021-01-14 ENCOUNTER — OFFICE VISIT (OUTPATIENT)
Dept: PODIATRY | Age: 71
End: 2021-01-14
Payer: MEDICARE

## 2021-01-14 VITALS — BODY MASS INDEX: 35.56 KG/M2 | HEIGHT: 71 IN | WEIGHT: 254 LBS

## 2021-01-14 DIAGNOSIS — M67.88 ACHILLES TENDINOSIS OF RIGHT LOWER EXTREMITY: ICD-10-CM

## 2021-01-14 DIAGNOSIS — M89.8X7 RETROCALCANEAL EXOSTOSIS: Primary | ICD-10-CM

## 2021-01-14 DIAGNOSIS — M79.671 RIGHT FOOT PAIN: ICD-10-CM

## 2021-01-14 DIAGNOSIS — R26.2 DIFFICULTY WALKING: ICD-10-CM

## 2021-01-14 PROBLEM — M76.61 TENDONITIS, ACHILLES, RIGHT: Status: RESOLVED | Noted: 2020-04-30 | Resolved: 2021-01-14

## 2021-01-14 PROCEDURE — 1036F TOBACCO NON-USER: CPT | Performed by: PODIATRIST

## 2021-01-14 PROCEDURE — 4040F PNEUMOC VAC/ADMIN/RCVD: CPT | Performed by: PODIATRIST

## 2021-01-14 PROCEDURE — G8482 FLU IMMUNIZE ORDER/ADMIN: HCPCS | Performed by: PODIATRIST

## 2021-01-14 PROCEDURE — 3017F COLORECTAL CA SCREEN DOC REV: CPT | Performed by: PODIATRIST

## 2021-01-14 PROCEDURE — G8427 DOCREV CUR MEDS BY ELIG CLIN: HCPCS | Performed by: PODIATRIST

## 2021-01-14 PROCEDURE — G8417 CALC BMI ABV UP PARAM F/U: HCPCS | Performed by: PODIATRIST

## 2021-01-14 PROCEDURE — 99213 OFFICE O/P EST LOW 20 MIN: CPT | Performed by: PODIATRIST

## 2021-01-14 PROCEDURE — 1123F ACP DISCUSS/DSCN MKR DOCD: CPT | Performed by: PODIATRIST

## 2021-01-14 NOTE — PROGRESS NOTES
Patient here for a follow up to discuss the MRI right ankle results.         Electronically signed by Trudi Camarillo MA on 1/14/2021 at 11:23 AM

## 2021-01-14 NOTE — PROGRESS NOTES
21     Jigar Mcgowan    : 1950   Sex: male    Age: 72 y.o. Patient's PCP/Provider is:  Kenna Lau. MD Jose    Subjective:  Patient is seen today for follow-up regarding continued evaluation chronic Achilles tendinitis issues right lower extremity. Patient presents today to discuss MRI results. Patient still having issues with swelling and pain into the posterior right heel region with every day ambulatory activities. He has been wearing his good supportive shoe gear and compression stockings as instructed. No other additional abnormalities noted at this time. Chief Complaint   Patient presents with    Results     mri ankle       ROS:  Const: Positives and pertinent negatives as per HPI. Musculo: Denies symptoms other than stated above. Neuro: Denies symptoms other than stated above. Skin: Denies symptoms other than stated above.     Current Medications:    Current Outpatient Medications:     metFORMIN (GLUCOPHAGE-XR) 500 MG extended release tablet, TAKE TWO TABLETS BY MOUTH EVERY DAY WITH BREAKFAST, Disp: 60 tablet, Rfl: 5    lisinopril (PRINIVIL;ZESTRIL) 5 MG tablet, Take 0.5 tablets by mouth daily, Disp: 45 tablet, Rfl: 1    atorvastatin (LIPITOR) 20 MG tablet, TAKE ONE TABLET BY MOUTH DAILY, Disp: 90 tablet, Rfl: 5    blood glucose test strips (ONETOUCH VERIO) strip, USE TO CHECK BLOOD SUGAR DAILY, Disp: 100 each, Rfl: 0    Lancets 30G MISC, 1 each by Does not apply route daily Pt uses Clear Choice Glucose Meter., Disp: 100 each, Rfl: 0    glucose monitoring kit (FREESTYLE) monitoring kit, 1 kit by Does not apply route daily Check sugars once a day, Disp: 1 kit, Rfl: 0    donepezil (ARICEPT) 10 MG tablet, Take 1 tablet by mouth nightly, Disp: 90 tablet, Rfl: 3    FLUoxetine (PROZAC) 40 MG capsule, Take 1 capsule by mouth daily, Disp: 90 capsule, Rfl: 3    amLODIPine (NORVASC) 10 MG tablet, Take 1 tablet by mouth daily, Disp: 90 tablet, Rfl: 3   hydrocortisone 2.5 % ointment, APPLY TOPICALLY 2 TIMES DAILY, Disp: 454 g, Rfl: 0    Blood Pressure Monitor LARY, Use daily to check blood pressure, Disp: 1 Device, Rfl: 0    Glucose Blood (GLUCOSE METER TEST VI), 1 each by In Vitro route daily, Disp: , Rfl:     Calcium Citrate-Vitamin D (CALCET CREAMY BITES) 500-400 MG-UNIT CHEW tablet, Take 1 tablet by mouth daily, Disp: , Rfl:     vitamin D (CHOLECALCIFEROL) 1000 UNIT TABS tablet, Take 1,000 Units by mouth daily, Disp: , Rfl:     vitamin B-12 (CYANOCOBALAMIN) 50 MCG tablet, Take 50 mcg by mouth daily, Disp: , Rfl:     ferrous gluconate (FERGON) 240 (27 Fe) MG tablet, Take 240 mg by mouth 3 times daily (with meals), Disp: , Rfl:     aspirin 81 MG EC tablet, Take 81 mg by mouth daily. , Disp: , Rfl:     Allergies:  No Known Allergies    Vitals:    01/14/21 1123   Weight: 254 lb (115.2 kg)   Height: 5' 11\" (1.803 m)       Exam:  Neurovascular status unchanged right lower extremity. Tenderness noted distal Achilles insertional area right lower extremity. Fusiform swelling noted distal Achilles tendon insertional area right. No ecchymotic skin changes present right lower extremity. No palpable defect noted along the course of the distal Achilles tendon.       Diagnostic Studies:     Xr Foot Right (min 3 Views)    Result Date: 1/6/2021 EXAMINATION: THREE XRAY VIEWS OF THE RIGHT FOOT 1/6/2021 1:22 pm COMPARISON: Right foot radiographs, October 31, 2016 HISTORY: ORDERING SYSTEM PROVIDED HISTORY: Right foot pain FINDINGS: Radiographs of the right foot reveal no evidence of fracture or joint dislocation. There is mild loss of joint space in the lateral margin of the 1st metatarsophalangeal joint. Mild hammertoe deformities are seen in the 2nd and 3rd toes. Minimal midfoot arthrosis. Calcaneal enthesophytes are seen at the insertions of the Achilles tendon and plantar fascia. There is increased soft tissue fullness in the region of the distal Achilles tendon. . 1. No fracture or joint dislocation is seen. 2. Increased soft tissue fullness at the site of distal Achilles tendon, which may represent a tear. If indicated, MRI may be obtained for further evaluation. 3. Degenerative changes, as described. Procedures:    None    Plan Per Assessment  Vicki Johnson was seen today for results. Diagnoses and all orders for this visit:    Retrocalcaneal exostosis    Achilles tendinosis of right lower extremity    Right foot pain    Difficulty walking      1. Evaluation and management  2. We did review patient's MRI studies prior to his appointment today, and did discuss results with patient and his wife today in detail. 3. We did discuss continued conservative care versus surgical intervention to treat the underlying exostosis with associated insertional tear. Patient does want to proceed with surgical intervention in the near future. 4. Patient will be followed up at a later date to discuss outpatient surgery in further detail, we did discuss additional care techniques at this time to prevent worsening of current symptoms. We did discuss continued use of his good supportive shoe gear and compression garments to reduce inflammation at the Achilles insertional area right. They were advised to call the office with any questions or concerns in the interim. Seen By:    Juan Tubbs DPM    Electronically signed by Juan Tubbs DPM on 1/14/2021 at 11:38 AM    This note was created using voice recognition software. The note was reviewed however may contain grammatical errors.

## 2021-01-28 ENCOUNTER — OFFICE VISIT (OUTPATIENT)
Dept: PODIATRY | Age: 71
End: 2021-01-28
Payer: MEDICARE

## 2021-01-28 VITALS — HEIGHT: 71 IN | BODY MASS INDEX: 35.56 KG/M2 | WEIGHT: 254 LBS

## 2021-01-28 DIAGNOSIS — M67.88 ACHILLES TENDINOSIS OF RIGHT LOWER EXTREMITY: Primary | ICD-10-CM

## 2021-01-28 DIAGNOSIS — R26.2 DIFFICULTY WALKING: ICD-10-CM

## 2021-01-28 DIAGNOSIS — M89.8X7 RETROCALCANEAL EXOSTOSIS: ICD-10-CM

## 2021-01-28 DIAGNOSIS — M79.671 RIGHT FOOT PAIN: ICD-10-CM

## 2021-01-28 PROCEDURE — 3017F COLORECTAL CA SCREEN DOC REV: CPT | Performed by: PODIATRIST

## 2021-01-28 PROCEDURE — 1123F ACP DISCUSS/DSCN MKR DOCD: CPT | Performed by: PODIATRIST

## 2021-01-28 PROCEDURE — G8427 DOCREV CUR MEDS BY ELIG CLIN: HCPCS | Performed by: PODIATRIST

## 2021-01-28 PROCEDURE — 99214 OFFICE O/P EST MOD 30 MIN: CPT | Performed by: PODIATRIST

## 2021-01-28 PROCEDURE — G8482 FLU IMMUNIZE ORDER/ADMIN: HCPCS | Performed by: PODIATRIST

## 2021-01-28 PROCEDURE — G8417 CALC BMI ABV UP PARAM F/U: HCPCS | Performed by: PODIATRIST

## 2021-01-28 PROCEDURE — 1036F TOBACCO NON-USER: CPT | Performed by: PODIATRIST

## 2021-01-28 PROCEDURE — 4040F PNEUMOC VAC/ADMIN/RCVD: CPT | Performed by: PODIATRIST

## 2021-01-28 NOTE — PROGRESS NOTES
Patient here today to [facility] podiatry surgery. Patient is scheduled for pre op clearance with Darryn Coyle MD for 2/8/2021.              Electronically signed by Subha Napoles MA on 1/28/2021 at 10:52 AM
. 1.  No fracture or joint dislocation is seen. 2. Increased soft tissue fullness at the site of distal Achilles tendon, which may represent a tear. If indicated, MRI may be obtained for further evaluation. 3. Degenerative changes, as described. Procedures:    None    Plan Per Assessment  Twan Stone was seen today for follow-up. Diagnoses and all orders for this visit:    Achilles tendinosis of right lower extremity    Retrocalcaneal exostosis    Right foot pain    Difficulty walking      1. Evaluation and management; MRI and X-ray results were reviewed prior to procedure today to prepare for surgical visit. 2. Discussed surgical procedure in detail which will consist of resection retrocalcaneal exostosis right, and Achilles tendon repair right foot. Patient was in agreement with proposed procedure.

## 2021-02-01 DIAGNOSIS — E11.9 TYPE 2 DIABETES MELLITUS WITHOUT COMPLICATION, WITHOUT LONG-TERM CURRENT USE OF INSULIN (HCC): ICD-10-CM

## 2021-02-01 RX ORDER — BLOOD SUGAR DIAGNOSTIC
STRIP MISCELLANEOUS
Qty: 100 EACH | Refills: 3 | Status: SHIPPED | OUTPATIENT
Start: 2021-02-01 | End: 2022-02-15 | Stop reason: SDUPTHER

## 2021-02-08 ENCOUNTER — OFFICE VISIT (OUTPATIENT)
Dept: FAMILY MEDICINE CLINIC | Age: 71
End: 2021-02-08
Payer: MEDICARE

## 2021-02-08 VITALS
SYSTOLIC BLOOD PRESSURE: 118 MMHG | TEMPERATURE: 97.3 F | OXYGEN SATURATION: 99 % | RESPIRATION RATE: 20 BRPM | WEIGHT: 255 LBS | HEART RATE: 71 BPM | DIASTOLIC BLOOD PRESSURE: 60 MMHG | HEIGHT: 71 IN | BODY MASS INDEX: 35.7 KG/M2

## 2021-02-08 DIAGNOSIS — E11.9 TYPE 2 DIABETES MELLITUS WITHOUT COMPLICATION, WITHOUT LONG-TERM CURRENT USE OF INSULIN (HCC): Primary | ICD-10-CM

## 2021-02-08 DIAGNOSIS — Z01.818 PREOPERATIVE EXAMINATION: ICD-10-CM

## 2021-02-08 LAB
ALBUMIN SERPL-MCNC: 4.2 G/DL (ref 3.5–5.2)
ALP BLD-CCNC: 109 U/L (ref 40–129)
ALT SERPL-CCNC: 22 U/L (ref 0–40)
ANION GAP SERPL CALCULATED.3IONS-SCNC: 12 MMOL/L (ref 7–16)
AST SERPL-CCNC: 14 U/L (ref 0–39)
BASOPHILS ABSOLUTE: 0.09 E9/L (ref 0–0.2)
BASOPHILS RELATIVE PERCENT: 1.1 % (ref 0–2)
BILIRUB SERPL-MCNC: <0.2 MG/DL (ref 0–1.2)
BUN BLDV-MCNC: 16 MG/DL (ref 8–23)
CALCIUM SERPL-MCNC: 9.5 MG/DL (ref 8.6–10.2)
CHLORIDE BLD-SCNC: 105 MMOL/L (ref 98–107)
CO2: 24 MMOL/L (ref 22–29)
CREAT SERPL-MCNC: 1 MG/DL (ref 0.7–1.2)
EOSINOPHILS ABSOLUTE: 0.18 E9/L (ref 0.05–0.5)
EOSINOPHILS RELATIVE PERCENT: 2.1 % (ref 0–6)
GFR AFRICAN AMERICAN: >60
GFR NON-AFRICAN AMERICAN: >60 ML/MIN/1.73
GLUCOSE BLD-MCNC: 103 MG/DL (ref 74–99)
HCT VFR BLD CALC: 40.4 % (ref 37–54)
HEMOGLOBIN: 12.8 G/DL (ref 12.5–16.5)
IMMATURE GRANULOCYTES #: 0.02 E9/L
IMMATURE GRANULOCYTES %: 0.2 % (ref 0–5)
LYMPHOCYTES ABSOLUTE: 2.8 E9/L (ref 1.5–4)
LYMPHOCYTES RELATIVE PERCENT: 33 % (ref 20–42)
MCH RBC QN AUTO: 29.1 PG (ref 26–35)
MCHC RBC AUTO-ENTMCNC: 31.7 % (ref 32–34.5)
MCV RBC AUTO: 91.8 FL (ref 80–99.9)
MONOCYTES ABSOLUTE: 0.92 E9/L (ref 0.1–0.95)
MONOCYTES RELATIVE PERCENT: 10.8 % (ref 2–12)
NEUTROPHILS ABSOLUTE: 4.47 E9/L (ref 1.8–7.3)
NEUTROPHILS RELATIVE PERCENT: 52.8 % (ref 43–80)
PDW BLD-RTO: 14.3 FL (ref 11.5–15)
PLATELET # BLD: 321 E9/L (ref 130–450)
PMV BLD AUTO: 12.4 FL (ref 7–12)
POTASSIUM SERPL-SCNC: 4.4 MMOL/L (ref 3.5–5)
RBC # BLD: 4.4 E12/L (ref 3.8–5.8)
SODIUM BLD-SCNC: 141 MMOL/L (ref 132–146)
TOTAL PROTEIN: 7.4 G/DL (ref 6.4–8.3)
WBC # BLD: 8.5 E9/L (ref 4.5–11.5)

## 2021-02-08 PROCEDURE — 93000 ELECTROCARDIOGRAM COMPLETE: CPT | Performed by: FAMILY MEDICINE

## 2021-02-08 PROCEDURE — 99213 OFFICE O/P EST LOW 20 MIN: CPT | Performed by: FAMILY MEDICINE

## 2021-02-08 PROCEDURE — 2022F DILAT RTA XM EVC RTNOPTHY: CPT | Performed by: FAMILY MEDICINE

## 2021-02-08 PROCEDURE — G8427 DOCREV CUR MEDS BY ELIG CLIN: HCPCS | Performed by: FAMILY MEDICINE

## 2021-02-08 PROCEDURE — 1036F TOBACCO NON-USER: CPT | Performed by: FAMILY MEDICINE

## 2021-02-08 PROCEDURE — 1123F ACP DISCUSS/DSCN MKR DOCD: CPT | Performed by: FAMILY MEDICINE

## 2021-02-08 PROCEDURE — G8482 FLU IMMUNIZE ORDER/ADMIN: HCPCS | Performed by: FAMILY MEDICINE

## 2021-02-08 PROCEDURE — 3017F COLORECTAL CA SCREEN DOC REV: CPT | Performed by: FAMILY MEDICINE

## 2021-02-08 PROCEDURE — G8417 CALC BMI ABV UP PARAM F/U: HCPCS | Performed by: FAMILY MEDICINE

## 2021-02-08 PROCEDURE — 4040F PNEUMOC VAC/ADMIN/RCVD: CPT | Performed by: FAMILY MEDICINE

## 2021-02-08 PROCEDURE — 3046F HEMOGLOBIN A1C LEVEL >9.0%: CPT | Performed by: FAMILY MEDICINE

## 2021-02-08 ASSESSMENT — ENCOUNTER SYMPTOMS
NAUSEA: 0
WHEEZING: 0
ABDOMINAL PAIN: 0
COUGH: 0
VOMITING: 0
SHORTNESS OF BREATH: 0

## 2021-02-08 NOTE — PROGRESS NOTES
[address] [facility]  [phone]   Taina Davila MD     Patient: Jigar Mcgowan  YOB: 1950  Visit Date: 2/8/21    Irl Salts is a 72y.o. year old male here today for   Chief Complaint   Patient presents with    Diabetes       HPI  Patient is a 72year old male here for diabetes here for preoperative clearance. Will be having bone spur removed on right heal and right achilles repair. Denies any issues anesthesia. No dyspnea on exertion. Had stress test June 2020 that was negative. No orthopnea, chest pain, sob. Feb 18 . Dr. Isauro Jo at surgery center. Sugars 135-160. Diarrhea every other day that he attributes to bypass surgery. Had diabetic eye exam at [place] within the last year. Was planning on getting covid vaccine. Review of Systems   Constitutional: Negative for chills and fever. Respiratory: Negative for cough, shortness of breath and wheezing. Cardiovascular: Negative for chest pain, palpitations and leg swelling. Gastrointestinal: Negative for abdominal pain, nausea and vomiting. Current Outpatient Medications on File Prior to Visit   Medication Sig Dispense Refill    blood glucose test strips (ONETOUCH VERIO) strip USE TO CHECK BLOOD SUGAR DAILY. SWITCH TO WHATEVER BRAND IS COVERED BY INSURANCE 100 each 3    metFORMIN (GLUCOPHAGE-XR) 500 MG extended release tablet TAKE TWO TABLETS BY MOUTH EVERY DAY WITH BREAKFAST 60 tablet 5    lisinopril (PRINIVIL;ZESTRIL) 5 MG tablet Take 0.5 tablets by mouth daily 45 tablet 1    atorvastatin (LIPITOR) 20 MG tablet TAKE ONE TABLET BY MOUTH DAILY 90 tablet 5    Lancets 30G MISC 1 each by Does not apply route daily Pt uses Clear Choice Glucose Meter.  100 each 0    glucose monitoring kit (FREESTYLE) monitoring kit 1 kit by Does not apply route daily Check sugars once a day 1 kit 0    donepezil (ARICEPT) 10 MG tablet Take 1 tablet by mouth nightly 90 tablet 3  FLUoxetine (PROZAC) 40 MG capsule Take 1 capsule by mouth daily 90 capsule 3    amLODIPine (NORVASC) 10 MG tablet Take 1 tablet by mouth daily 90 tablet 3    hydrocortisone 2.5 % ointment APPLY TOPICALLY 2 TIMES DAILY 454 g 0    Blood Pressure Monitor LARY Use daily to check blood pressure 1 Device 0    Glucose Blood (GLUCOSE METER TEST VI) 1 each by In Vitro route daily      Calcium Citrate-Vitamin D (CALCET CREAMY BITES) 500-400 MG-UNIT CHEW tablet Take 1 tablet by mouth daily      vitamin D (CHOLECALCIFEROL) 1000 UNIT TABS tablet Take 1,000 Units by mouth daily      vitamin B-12 (CYANOCOBALAMIN) 50 MCG tablet Take 50 mcg by mouth daily      ferrous gluconate (FERGON) 240 (27 Fe) MG tablet Take 240 mg by mouth 3 times daily (with meals)      aspirin 81 MG EC tablet Take 81 mg by mouth daily Stopped 1 week pre op       No current facility-administered medications on file prior to visit. No Known Allergies    Past medical, surgical, socialand/or family history reviewed, updated as needed, and are non-contributory (unless otherwise stated). Medications, allergies, and problem list also reviewed and updated as needed in patient's record. Wt Readings from Last 3 Encounters:   02/08/21 255 lb (115.7 kg)   01/28/21 254 lb (115.2 kg)   01/14/21 254 lb (115.2 kg)                   /60   Pulse 71   Temp 97.3 °F (36.3 °C)   Resp 20   Ht 5' 11\" (1.803 m)   Wt 255 lb (115.7 kg)   SpO2 99%   BMI 35.57 kg/m²        Physical Exam  Vitals signs and nursing note reviewed. Constitutional:       General: He is not in acute distress. Appearance: He is well-developed. He is not diaphoretic. HENT:      Head: Normocephalic and atraumatic. Cardiovascular:      Rate and Rhythm: Normal rate and regular rhythm. Heart sounds: Normal heart sounds. No murmur. Pulmonary:      Effort: Pulmonary effort is normal. No respiratory distress. Breath sounds: Normal breath sounds.  No wheezing or rales.   Abdominal:      General: Bowel sounds are normal. There is no distension. Tenderness: There is no abdominal tenderness. There is no guarding or rebound. Musculoskeletal: Normal range of motion. General: Swelling (trace) present. Results for orders placed or performed in visit on 11/16/20   POCT glycosylated hemoglobin (Hb A1C)   Result Value Ref Range    Hemoglobin A1C 7.3 %       ASSESSMENT/PLAN  Vicki Johnosn was seen today for diabetes. Diagnoses and all orders for this visit:    Type 2 diabetes mellitus without complication, without long-term current use of insulin ([facility] [address]    Preoperative examination  -     COMPREHENSIVE METABOLIC PANEL; Future  -     CBC; Future  -     COVID-19 Ambulatory; Future  -     EKG 12 Lead      EKG wnl   Can complete > 4 METS   Recent normal stress test.   Ok to stop Aspirin for 1 week prior to surgery. Diabetes controlled. Continue current meds. Phone/MyChart follow up if tests abnormal.    Return in about 3 months (around 5/8/2021). or sooner if necessary. I have reviewed myfindings and recommendations with Vicki Johnson. Tami Mendoza.  Irwin Tsai M.D

## 2021-02-09 ENCOUNTER — HOSPITAL ENCOUNTER (OUTPATIENT)
Age: 71
Discharge: HOME | End: 2021-02-11
Payer: MEDICARE

## 2021-02-09 DIAGNOSIS — Z01.818 PREOPERATIVE EXAMINATION: ICD-10-CM

## 2021-02-09 PROCEDURE — U0003 INFECTIOUS AGENT DETECTION BY NUCLEIC ACID (DNA OR RNA); SEVERE ACUTE RESPIRATORY SYNDROME CORONAVIRUS 2 (SARS-COV-2) (CORONAVIRUS DISEASE [COVID-19]), AMPLIFIED PROBE TECHNIQUE, MAKING USE OF HIGH THROUGHPUT TECHNOLOGIES AS DESCRIBED BY CMS-2020-01-R: HCPCS

## 2021-02-10 LAB
SARS-COV-2: NOT DETECTED
SOURCE: NORMAL

## 2021-02-11 ENCOUNTER — TELEPHONE (OUTPATIENT)
Dept: FAMILY MEDICINE CLINIC | Age: 71
End: 2021-02-11

## 2021-02-11 NOTE — TELEPHONE ENCOUNTER
Surgery center called said they did not receive medical cleaarance and Dr. Yoav Amaro is asking if its is ok for pt to stop  ASA for 1 week  Prior to surgery?     Last seen 2/8/2021  Next appt Visit date not found

## 2021-02-15 ENCOUNTER — ANESTHESIA EVENT (OUTPATIENT)
Dept: OPERATING ROOM | Age: 71
End: 2021-02-15
Payer: MEDICARE

## 2021-02-17 ENCOUNTER — PREP FOR PROCEDURE (OUTPATIENT)
Dept: PODIATRY | Age: 71
End: 2021-02-17

## 2021-02-17 RX ORDER — SODIUM CHLORIDE 0.9 % (FLUSH) 0.9 %
10 SYRINGE (ML) INJECTION PRN
Status: CANCELLED | OUTPATIENT
Start: 2021-02-17

## 2021-02-17 RX ORDER — SODIUM CHLORIDE 0.9 % (FLUSH) 0.9 %
10 SYRINGE (ML) INJECTION EVERY 12 HOURS SCHEDULED
Status: CANCELLED | OUTPATIENT
Start: 2021-02-17

## 2021-02-17 ASSESSMENT — ENCOUNTER SYMPTOMS: SHORTNESS OF BREATH: 1

## 2021-02-17 ASSESSMENT — LIFESTYLE VARIABLES: SMOKING_STATUS: 0

## 2021-02-17 NOTE — ANESTHESIA PRE PROCEDURE
Department of Anesthesiology  Preprocedure Note       Name:  Mariann Knutson   Age:  72 y.o.  :  1950                                          MRN:  [id number]         Date:  2021      Surgeon: Rhianna Steinberg):  Yovanny Orellana DPM    Procedure: Procedure(s):  RESECTION EXOSTOSIS RIGHT FOOT, REPAIR ACHILLES TENDON RIGHT (CPT 22956) ([facility])    Medications prior to admission:   Prior to Admission medications    Medication Sig Start Date End Date Taking? Authorizing Provider   metFORMIN (GLUCOPHAGE-XR) 500 MG extended release tablet TAKE TWO TABLETS BY MOUTH EVERY DAY WITH BREAKFAST 20  Yes Eleonora Parmar MD   lisinopril (PRINIVIL;ZESTRIL) 5 MG tablet Take 0.5 tablets by mouth daily 20 Yes Eleonora Parmar MD   atorvastatin (LIPITOR) 20 MG tablet TAKE ONE TABLET BY MOUTH DAILY 20  Yes Eleonora Parmar MD   donepezil (ARICEPT) 10 MG tablet Take 1 tablet by mouth nightly 20  Yes Eleonora Parmar MD   FLUoxetine (PROZAC) 40 MG capsule Take 1 capsule by mouth daily 20  Yes Eleonora Parmar MD   amLODIPine (NORVASC) 10 MG tablet Take 1 tablet by mouth daily 20  Yes Eleonora Parmar MD   Calcium Citrate-Vitamin D (CALCET CREAMY BITES) 500-400 MG-UNIT CHEW tablet Take 1 tablet by mouth daily   Yes Historical Provider, MD   vitamin D (CHOLECALCIFEROL) 1000 UNIT TABS tablet Take 1,000 Units by mouth daily   Yes Historical Provider, MD   vitamin B-12 (CYANOCOBALAMIN) 50 MCG tablet Take 50 mcg by mouth daily   Yes Historical Provider, MD   ferrous gluconate (FERGON) 240 (27 Fe) MG tablet Take 240 mg by mouth 3 times daily (with meals)   Yes Historical Provider, MD   aspirin 81 MG EC tablet Take 81 mg by mouth daily Stopped 1 week pre op   Yes Historical Provider, MD   blood glucose test strips (ONETOUCH VERIO) strip USE TO CHECK BLOOD SUGAR DAILY.     SWITCH TO WHATEVER BRAND IS COVERED BY INSURANCE 21   Eleonora Parmar MD Lancets 30G MISC 1 each by Does not apply route daily Pt uses Clear Choice Glucose Meter. 7/13/20   Danae Bailey MD   glucose monitoring kit (FREESTYLE) monitoring kit 1 kit by Does not apply route daily Check sugars once a day 7/13/20   Danae Bailey MD   hydrocortisone 2.5 % ointment APPLY TOPICALLY 2 TIMES DAILY 10/29/19   Danae Bailey MD   Blood Pressure Monitor LARY Use daily to check blood pressure 4/26/18   Danae Bailey MD   Glucose Blood (GLUCOSE METER TEST VI) 1 each by In Vitro route daily    Historical Provider, MD       Current medications:    No current facility-administered medications for this encounter. Current Outpatient Medications   Medication Sig Dispense Refill    metFORMIN (GLUCOPHAGE-XR) 500 MG extended release tablet TAKE TWO TABLETS BY MOUTH EVERY DAY WITH BREAKFAST 60 tablet 5    lisinopril (PRINIVIL;ZESTRIL) 5 MG tablet Take 0.5 tablets by mouth daily 45 tablet 1    atorvastatin (LIPITOR) 20 MG tablet TAKE ONE TABLET BY MOUTH DAILY 90 tablet 5    donepezil (ARICEPT) 10 MG tablet Take 1 tablet by mouth nightly 90 tablet 3    FLUoxetine (PROZAC) 40 MG capsule Take 1 capsule by mouth daily 90 capsule 3    amLODIPine (NORVASC) 10 MG tablet Take 1 tablet by mouth daily 90 tablet 3    Calcium Citrate-Vitamin D (CALCET CREAMY BITES) 500-400 MG-UNIT CHEW tablet Take 1 tablet by mouth daily      vitamin D (CHOLECALCIFEROL) 1000 UNIT TABS tablet Take 1,000 Units by mouth daily      vitamin B-12 (CYANOCOBALAMIN) 50 MCG tablet Take 50 mcg by mouth daily      ferrous gluconate (FERGON) 240 (27 Fe) MG tablet Take 240 mg by mouth 3 times daily (with meals)      aspirin 81 MG EC tablet Take 81 mg by mouth daily Stopped 1 week pre op      blood glucose test strips (ONETOUCH VERIO) strip USE TO CHECK BLOOD SUGAR DAILY.     SWITCH TO WHATEVER BRAND IS COVERED BY INSURANCE 100 each 3  Lancets 30G MISC 1 each by Does not apply route daily Pt uses Clear Choice Glucose Meter.  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    Numbness and tingling R20.0, R20.2    Small vessel disease (HCC) I73.9    Cervical muscle pain M54.2    Hypercholesteremia E78.00    Carpal tunnel syndrome G56.00    Essential hypertension I10    Moderate episode of recurrent major depressive disorder (HCC) F33.1    Memory loss R41.3    Cerebral ischemia I67.82    Cognitive dysfunction F09    Exertional dyspnea R06.00    Moderate obesity E66.8    Venous insufficiency (chronic) (peripheral) I87.2    Dementia without behavioral disturbance (HCC) F03.90    Type 2 diabetes mellitus without complication, without long-term current use of insulin (HCC) E11.9    Puncture wound of right foot S91.331A    Type II diabetes mellitus with peripheral circulatory disorder (HCC) E11.51    Retrocalcaneal exostosis M89.8X7    Localized edema R60.0    Right foot pain M79.671    Achilles tendinosis of right lower extremity M67.88    Difficulty walking R26.2       Past Medical History:        Diagnosis Date    CAD (coronary artery disease)     Dementia ([address]     early stages  just short term memory loss currently    DM (diabetes mellitus) ([address]     Hx of cardiovascular stress test 06/16/2020    Hyperlipidemia     Hypertension     Moderate episode of recurrent major depressive disorder ([address] 5/17/2018    Myocardial infarction ([address] 1995    slight mild    Osteoarthritis Past Surgical History:        Procedure Laterality Date    ANTERIOR CRUCIATE LIGAMENT REPAIR  2002 left knee    COLONOSCOPY      INGUINAL HERNIA REPAIR  1991 & [address] [facility] REPLACEMENT   right    right knee    PENILE PROSTHESIS      CHET-EN-Y GASTRIC BYPASS  2004 Dr. Tank Galan  2004 Dr. Mason Desir  12/15/2004 Dr. Andrew Walton  2007 Laparoscopic with mesh, Dr Lety Simms       Social History:    Social History     Tobacco Use    Smoking status: Former Smoker     Years: 5.00     Types: Cigarettes     Quit date: 1970     Years since quittin.1    Smokeless tobacco: Never Used   Substance Use Topics    Alcohol use: No     Comment: was an alcohol until . Went to [place] [id number] then had went to a doctor in [facility]    [address] [facility]                                 Counseling given: Not Answered      Vital Signs (Current):   Vitals:    21 1306   Weight: 255 lb (115.7 kg)   Height: 5' 10\" (1.778 m)                                              BP Readings from Last 3 Encounters:   21 118/60   20 118/66   08/10/20 128/78       NPO Status:  >8.H                                                                               BMI:   Wt Readings from Last 3 Encounters:   21 255 lb (115.7 kg)   21 254 lb (115.2 kg)   21 254 lb (115.2 kg)     Body mass index is 36.59 kg/m².     CBC:   Lab Results   Component Value Date    WBC 8.5 2021    RBC 4.40 2021    HGB 12.8 2021    HCT 40.4 2021    MCV 91.8 2021    RDW 14.3 2021     2021       CMP:   Lab Results   Component Value Date     2021    K 4.4 2021     2021    CO2 24 2021    BUN 16 2021    CREATININE 1.0 2021    GFRAA >60 2021    LABGLOM >60 2021    GLUCOSE 103 2021    GLUCOSE 87 2012 PROT 7.4 02/08/2021    CALCIUM 9.5 02/08/2021    BILITOT <0.2 02/08/2021    ALKPHOS 109 02/08/2021    AST 14 02/08/2021    ALT 22 02/08/2021       POC Tests: No results for input(s): POCGLU, POCNA, POCK, POCCL, POCBUN, POCHEMO, POCHCT in the last 72 hours. Coags: No results found for: PROTIME, INR, APTT    HCG (If Applicable): No results found for: PREGTESTUR, PREGSERUM, HCG, HCGQUANT     ABGs: No results found for: PHART, PO2ART, YHV3BCT, UUH7TNS, BEART, W8QZQAZL     Type & Screen (If Applicable):  No results found for: LABABO, LABRH    Drug/Infectious Status (If Applicable):  No results found for: HIV, HEPCAB    COVID-19 Screening (If Applicable):   Lab Results   Component Value Date    COVID19 Not Detected 02/09/2021         Anesthesia Evaluation  Patient summary reviewed no history of anesthetic complications:   Airway: Mallampati: II  TM distance: >3 FB   Neck ROM: full  Mouth opening: > = 3 FB Dental:    (+) caps      Pulmonary: breath sounds clear to auscultation  (+) shortness of breath:      (-) not a current smoker (ex smoker)                           Cardiovascular:  Exercise tolerance: good (>4 METS),   (+) hypertension:, past MI: > 6 months, CAD:, [place]:, hyperlipidemia      ECG reviewed  Rhythm: regular  Rate: normal           Beta Blocker:  Not on Beta Blocker      ROS comment: EKG=NSR    Cleared by PCP     Neuro/Psych:   (+) neuromuscular disease:, psychiatric history (early dementia):            GI/Hepatic/Renal:            ROS comment: Hx gastric bypass. Endo/Other:    (+) DiabetesType II DM, , : arthritis:., .                 Abdominal:   (+) obese,         Vascular:                                      Anesthesia Plan      general and spinal     ASA 3     (Prone (?)--Consider SAB---refused    PCP clearance on chart)  Induction: intravenous. MIPS: Postoperative opioids intended and Prophylactic antiemetics administered. Anesthetic plan and risks discussed with patient. Plan discussed with CRNA.               Jose Alfredo Daley MD   2/17/2021

## 2021-02-18 ENCOUNTER — ANESTHESIA (OUTPATIENT)
Dept: OPERATING ROOM | Age: 71
End: 2021-02-18
Payer: MEDICARE

## 2021-02-18 ENCOUNTER — HOSPITAL ENCOUNTER (OUTPATIENT)
Age: 71
Setting detail: OUTPATIENT SURGERY
Discharge: HOME | End: 2021-02-18
Attending: PODIATRIST | Admitting: PODIATRIST
Payer: MEDICARE

## 2021-02-18 VITALS
SYSTOLIC BLOOD PRESSURE: 105 MMHG | RESPIRATION RATE: 11 BRPM | DIASTOLIC BLOOD PRESSURE: 65 MMHG | OXYGEN SATURATION: 98 % | TEMPERATURE: 97.5 F

## 2021-02-18 VITALS
WEIGHT: 255 LBS | BODY MASS INDEX: 35.7 KG/M2 | OXYGEN SATURATION: 94 % | RESPIRATION RATE: 14 BRPM | DIASTOLIC BLOOD PRESSURE: 72 MMHG | SYSTOLIC BLOOD PRESSURE: 110 MMHG | TEMPERATURE: 96 F | HEART RATE: 90 BPM | HEIGHT: 71 IN

## 2021-02-18 DIAGNOSIS — M89.8X7 RETROCALCANEAL EXOSTOSIS: ICD-10-CM

## 2021-02-18 DIAGNOSIS — M79.671 RIGHT FOOT PAIN: ICD-10-CM

## 2021-02-18 DIAGNOSIS — I10 ESSENTIAL HYPERTENSION: ICD-10-CM

## 2021-02-18 DIAGNOSIS — F33.1 MODERATE EPISODE OF RECURRENT MAJOR DEPRESSIVE DISORDER (HCC): ICD-10-CM

## 2021-02-18 DIAGNOSIS — M67.88 ACHILLES TENDINOSIS OF RIGHT LOWER EXTREMITY: Primary | ICD-10-CM

## 2021-02-18 LAB — METER GLUCOSE: 150 MG/DL (ref 74–99)

## 2021-02-18 PROCEDURE — 2500000003 HC RX 250 WO HCPCS: Performed by: PODIATRIST

## 2021-02-18 PROCEDURE — 7100000010 HC PHASE II RECOVERY - FIRST 15 MIN: Performed by: PODIATRIST

## 2021-02-18 PROCEDURE — 82962 GLUCOSE BLOOD TEST: CPT

## 2021-02-18 PROCEDURE — 2709999900 HC NON-CHARGEABLE SUPPLY: Performed by: PODIATRIST

## 2021-02-18 PROCEDURE — 3600000012 HC SURGERY LEVEL 2 ADDTL 15MIN: Performed by: PODIATRIST

## 2021-02-18 PROCEDURE — 2580000003 HC RX 258: Performed by: ANESTHESIOLOGY

## 2021-02-18 PROCEDURE — 2500000003 HC RX 250 WO HCPCS: Performed by: NURSE ANESTHETIST, CERTIFIED REGISTERED

## 2021-02-18 PROCEDURE — 88311 DECALCIFY TISSUE: CPT

## 2021-02-18 PROCEDURE — 7100000000 HC PACU RECOVERY - FIRST 15 MIN: Performed by: PODIATRIST

## 2021-02-18 PROCEDURE — 7100000001 HC PACU RECOVERY - ADDTL 15 MIN: Performed by: PODIATRIST

## 2021-02-18 PROCEDURE — 7100000011 HC PHASE II RECOVERY - ADDTL 15 MIN: Performed by: PODIATRIST

## 2021-02-18 PROCEDURE — 88304 TISSUE EXAM BY PATHOLOGIST: CPT

## 2021-02-18 PROCEDURE — 3600000002 HC SURGERY LEVEL 2 BASE: Performed by: PODIATRIST

## 2021-02-18 PROCEDURE — C1713 ANCHOR/SCREW BN/BN,TIS/BN: HCPCS | Performed by: PODIATRIST

## 2021-02-18 PROCEDURE — 3700000000 HC ANESTHESIA ATTENDED CARE: Performed by: PODIATRIST

## 2021-02-18 PROCEDURE — 6360000002 HC RX W HCPCS: Performed by: NURSE ANESTHETIST, CERTIFIED REGISTERED

## 2021-02-18 PROCEDURE — 3700000001 HC ADD 15 MINUTES (ANESTHESIA): Performed by: PODIATRIST

## 2021-02-18 PROCEDURE — 28120 PART REMOVAL OF ANKLE/HEEL: CPT | Performed by: PODIATRIST

## 2021-02-18 PROCEDURE — 6360000002 HC RX W HCPCS: Performed by: PODIATRIST

## 2021-02-18 PROCEDURE — 27654 REPAIR OF ACHILLES TENDON: CPT | Performed by: PODIATRIST

## 2021-02-18 DEVICE — PACK SUT ANCHR BIOCOMPOSITE SWIVELOCK CONVENIENCE DRL GUID: Type: IMPLANTABLE DEVICE | Site: ANKLE | Status: FUNCTIONAL

## 2021-02-18 RX ORDER — NEOSTIGMINE METHYLSULFATE 1 MG/ML
INJECTION, SOLUTION INTRAVENOUS PRN
Status: DISCONTINUED | OUTPATIENT
Start: 2021-02-18 | End: 2021-02-18 | Stop reason: SDUPTHER

## 2021-02-18 RX ORDER — ONDANSETRON 2 MG/ML
INJECTION INTRAMUSCULAR; INTRAVENOUS PRN
Status: DISCONTINUED | OUTPATIENT
Start: 2021-02-18 | End: 2021-02-18 | Stop reason: SDUPTHER

## 2021-02-18 RX ORDER — DIPHENHYDRAMINE HYDROCHLORIDE 50 MG/ML
12.5 INJECTION INTRAMUSCULAR; INTRAVENOUS
Status: DISCONTINUED | OUTPATIENT
Start: 2021-02-18 | End: 2021-02-18 | Stop reason: HOSPADM

## 2021-02-18 RX ORDER — SODIUM CHLORIDE 0.9 % (FLUSH) 0.9 %
10 SYRINGE (ML) INJECTION EVERY 12 HOURS SCHEDULED
Status: DISCONTINUED | OUTPATIENT
Start: 2021-02-18 | End: 2021-02-18 | Stop reason: HOSPADM

## 2021-02-18 RX ORDER — FENTANYL CITRATE 50 UG/ML
50 INJECTION, SOLUTION INTRAMUSCULAR; INTRAVENOUS EVERY 5 MIN PRN
Status: DISCONTINUED | OUTPATIENT
Start: 2021-02-18 | End: 2021-02-18 | Stop reason: HOSPADM

## 2021-02-18 RX ORDER — BUPIVACAINE HYDROCHLORIDE 5 MG/ML
INJECTION, SOLUTION PERINEURAL PRN
Status: DISCONTINUED | OUTPATIENT
Start: 2021-02-18 | End: 2021-02-18 | Stop reason: ALTCHOICE

## 2021-02-18 RX ORDER — OXYCODONE HYDROCHLORIDE AND ACETAMINOPHEN 5; 325 MG/1; MG/1
1 TABLET ORAL EVERY 4 HOURS PRN
Status: DISCONTINUED | OUTPATIENT
Start: 2021-02-18 | End: 2021-02-18 | Stop reason: HOSPADM

## 2021-02-18 RX ORDER — DEXAMETHASONE SODIUM PHOSPHATE 10 MG/ML
INJECTION, SOLUTION INTRAMUSCULAR; INTRAVENOUS PRN
Status: DISCONTINUED | OUTPATIENT
Start: 2021-02-18 | End: 2021-02-18 | Stop reason: SDUPTHER

## 2021-02-18 RX ORDER — HYDROCODONE BITARTRATE AND ACETAMINOPHEN 5; 325 MG/1; MG/1
2 TABLET ORAL PRN
Status: DISCONTINUED | OUTPATIENT
Start: 2021-02-18 | End: 2021-02-18 | Stop reason: HOSPADM

## 2021-02-18 RX ORDER — HYDROCODONE BITARTRATE AND ACETAMINOPHEN 5; 325 MG/1; MG/1
1 TABLET ORAL EVERY 6 HOURS PRN
Qty: 20 TABLET | Refills: 0 | Status: SHIPPED | OUTPATIENT
Start: 2021-02-18 | End: 2021-02-23

## 2021-02-18 RX ORDER — FLUOXETINE HYDROCHLORIDE 40 MG/1
CAPSULE ORAL
Qty: 90 CAPSULE | Refills: 0 | Status: SHIPPED
Start: 2021-02-18 | End: 2021-05-25 | Stop reason: SDUPTHER

## 2021-02-18 RX ORDER — MEPERIDINE HYDROCHLORIDE 25 MG/ML
12.5 INJECTION INTRAMUSCULAR; INTRAVENOUS; SUBCUTANEOUS EVERY 5 MIN PRN
Status: DISCONTINUED | OUTPATIENT
Start: 2021-02-18 | End: 2021-02-18 | Stop reason: HOSPADM

## 2021-02-18 RX ORDER — ROCURONIUM BROMIDE 10 MG/ML
INJECTION, SOLUTION INTRAVENOUS PRN
Status: DISCONTINUED | OUTPATIENT
Start: 2021-02-18 | End: 2021-02-18 | Stop reason: SDUPTHER

## 2021-02-18 RX ORDER — PROPOFOL 10 MG/ML
INJECTION, EMULSION INTRAVENOUS PRN
Status: DISCONTINUED | OUTPATIENT
Start: 2021-02-18 | End: 2021-02-18 | Stop reason: SDUPTHER

## 2021-02-18 RX ORDER — HYDROCODONE BITARTRATE AND ACETAMINOPHEN 5; 325 MG/1; MG/1
1 TABLET ORAL PRN
Status: DISCONTINUED | OUTPATIENT
Start: 2021-02-18 | End: 2021-02-18 | Stop reason: HOSPADM

## 2021-02-18 RX ORDER — LABETALOL HYDROCHLORIDE 5 MG/ML
5 INJECTION, SOLUTION INTRAVENOUS EVERY 10 MIN PRN
Status: DISCONTINUED | OUTPATIENT
Start: 2021-02-18 | End: 2021-02-18 | Stop reason: HOSPADM

## 2021-02-18 RX ORDER — MORPHINE SULFATE 2 MG/ML
1 INJECTION, SOLUTION INTRAMUSCULAR; INTRAVENOUS EVERY 5 MIN PRN
Status: DISCONTINUED | OUTPATIENT
Start: 2021-02-18 | End: 2021-02-18 | Stop reason: HOSPADM

## 2021-02-18 RX ORDER — PROMETHAZINE HYDROCHLORIDE 25 MG/ML
25 INJECTION, SOLUTION INTRAMUSCULAR; INTRAVENOUS
Status: DISCONTINUED | OUTPATIENT
Start: 2021-02-18 | End: 2021-02-18 | Stop reason: HOSPADM

## 2021-02-18 RX ORDER — FENTANYL CITRATE 50 UG/ML
INJECTION, SOLUTION INTRAMUSCULAR; INTRAVENOUS PRN
Status: DISCONTINUED | OUTPATIENT
Start: 2021-02-18 | End: 2021-02-18 | Stop reason: SDUPTHER

## 2021-02-18 RX ORDER — CEFAZOLIN SODIUM 2 G/50ML
2000 SOLUTION INTRAVENOUS
Status: COMPLETED | OUTPATIENT
Start: 2021-02-18 | End: 2021-02-18

## 2021-02-18 RX ORDER — AMLODIPINE BESYLATE 10 MG/1
TABLET ORAL
Qty: 90 TABLET | Refills: 0 | Status: SHIPPED
Start: 2021-02-18 | End: 2021-05-25 | Stop reason: SDUPTHER

## 2021-02-18 RX ORDER — SODIUM CHLORIDE, SODIUM LACTATE, POTASSIUM CHLORIDE, CALCIUM CHLORIDE 600; 310; 30; 20 MG/100ML; MG/100ML; MG/100ML; MG/100ML
INJECTION, SOLUTION INTRAVENOUS CONTINUOUS
Status: DISCONTINUED | OUTPATIENT
Start: 2021-02-18 | End: 2021-02-18 | Stop reason: HOSPADM

## 2021-02-18 RX ORDER — SODIUM CHLORIDE 0.9 % (FLUSH) 0.9 %
10 SYRINGE (ML) INJECTION PRN
Status: DISCONTINUED | OUTPATIENT
Start: 2021-02-18 | End: 2021-02-18 | Stop reason: HOSPADM

## 2021-02-18 RX ORDER — GLYCOPYRROLATE 1 MG/5 ML
SYRINGE (ML) INTRAVENOUS PRN
Status: DISCONTINUED | OUTPATIENT
Start: 2021-02-18 | End: 2021-02-18 | Stop reason: SDUPTHER

## 2021-02-18 RX ORDER — HYDRALAZINE HYDROCHLORIDE 20 MG/ML
5 INJECTION INTRAMUSCULAR; INTRAVENOUS EVERY 10 MIN PRN
Status: DISCONTINUED | OUTPATIENT
Start: 2021-02-18 | End: 2021-02-18 | Stop reason: HOSPADM

## 2021-02-18 RX ORDER — PHENYLEPHRINE HYDROCHLORIDE 10 MG/ML
INJECTION INTRAVENOUS PRN
Status: DISCONTINUED | OUTPATIENT
Start: 2021-02-18 | End: 2021-02-18 | Stop reason: SDUPTHER

## 2021-02-18 RX ADMIN — FENTANYL CITRATE 100 MCG: 50 INJECTION, SOLUTION INTRAMUSCULAR; INTRAVENOUS at 06:44

## 2021-02-18 RX ADMIN — SODIUM CHLORIDE, POTASSIUM CHLORIDE, SODIUM LACTATE AND CALCIUM CHLORIDE: 600; 310; 30; 20 INJECTION, SOLUTION INTRAVENOUS at 06:40

## 2021-02-18 RX ADMIN — Medication 3 MG: at 08:11

## 2021-02-18 RX ADMIN — PROPOFOL 180 MG: 10 INJECTION, EMULSION INTRAVENOUS at 06:44

## 2021-02-18 RX ADMIN — Medication 0.6 MG: at 08:11

## 2021-02-18 RX ADMIN — PHENYLEPHRINE HYDROCHLORIDE 100 MCG: 10 INJECTION INTRAVENOUS at 07:07

## 2021-02-18 RX ADMIN — CEFAZOLIN SODIUM 2000 MG: 2 SOLUTION INTRAVENOUS at 06:36

## 2021-02-18 RX ADMIN — DEXAMETHASONE SODIUM PHOSPHATE 10 MG: 10 INJECTION, SOLUTION INTRAMUSCULAR; INTRAVENOUS at 06:51

## 2021-02-18 RX ADMIN — ONDANSETRON 4 MG: 2 INJECTION INTRAMUSCULAR; INTRAVENOUS at 07:48

## 2021-02-18 RX ADMIN — SODIUM CHLORIDE, POTASSIUM CHLORIDE, SODIUM LACTATE AND CALCIUM CHLORIDE: 600; 310; 30; 20 INJECTION, SOLUTION INTRAVENOUS at 05:48

## 2021-02-18 RX ADMIN — ROCURONIUM BROMIDE 40 MG: 10 SOLUTION INTRAVENOUS at 06:44

## 2021-02-18 ASSESSMENT — PULMONARY FUNCTION TESTS
PIF_VALUE: 20
PIF_VALUE: 30
PIF_VALUE: 30
PIF_VALUE: 31
PIF_VALUE: 20
PIF_VALUE: 29
PIF_VALUE: 2
PIF_VALUE: 30
PIF_VALUE: 18
PIF_VALUE: 11
PIF_VALUE: 30
PIF_VALUE: 31
PIF_VALUE: 30
PIF_VALUE: 20
PIF_VALUE: 30
PIF_VALUE: 42
PIF_VALUE: 31
PIF_VALUE: 29
PIF_VALUE: 29
PIF_VALUE: 30
PIF_VALUE: 30
PIF_VALUE: 31
PIF_VALUE: 30
PIF_VALUE: 2
PIF_VALUE: 29
PIF_VALUE: 30
PIF_VALUE: 29
PIF_VALUE: 30
PIF_VALUE: 30
PIF_VALUE: 31
PIF_VALUE: 2
PIF_VALUE: 31
PIF_VALUE: 31
PIF_VALUE: 30
PIF_VALUE: 30
PIF_VALUE: 24
PIF_VALUE: 20
PIF_VALUE: 30
PIF_VALUE: 31
PIF_VALUE: 2
PIF_VALUE: 5
PIF_VALUE: 1
PIF_VALUE: 31
PIF_VALUE: 33
PIF_VALUE: 20
PIF_VALUE: 41
PIF_VALUE: 30
PIF_VALUE: 29
PIF_VALUE: 31

## 2021-02-18 ASSESSMENT — PAIN - FUNCTIONAL ASSESSMENT: PAIN_FUNCTIONAL_ASSESSMENT: 0-10

## 2021-02-18 ASSESSMENT — PAIN DESCRIPTION - DESCRIPTORS: DESCRIPTORS: ACHING

## 2021-02-18 ASSESSMENT — PAIN SCALES - GENERAL
PAINLEVEL_OUTOF10: 0
PAINLEVEL_OUTOF10: 0

## 2021-02-18 NOTE — OP NOTE
Operative Note      Patient: William Glez  YOB: 1950  MRN: [id number]    Date of Procedure: 2/18/2021    Pre-Op Diagnosis: 1. RETROCALCANEAL EXOSTOSIS RIGHT FOOT  2. ACHILLES TENDONOSIS RIGHT  3. PAIN IN RIGHT FOOT    Post-Op Diagnosis: Same       Procedure(s):  RESECTION EXOSTOSIS RIGHT FOOT, REPAIR ACHILLES TENDON RIGHT (CPT 21884) ([facility])    Surgeon(s):  Antonino Zhang DPM    Assistant:   * No surgical staff found *    Anesthesia: General    Estimated Blood Loss (mL): Minimal    Complications: None    Specimens:   ID Type Source Tests Collected by Time Destination   A : EXOSTOSIS RIGHT HEEL Bone Bone SURGICAL PATHOLOGY Antonino Zhang DPM 2/18/2021 0716        Implants:  Implant Name Type Inv.  Item Serial No.  Lot No. LRB No. Used Action   PACK SUT ANCHR BIOCOMPOSITE SWIVELOCK CONVENIENCE DRL GUID  PACK SUT ANCHR BIOCOMPOSITE SWIVELOCK CONVENIENCE [facility] [id number] Right 1 Implanted         Drains: * No LDAs found *    Findings: Prominent retrocalcaneal exostosis right heel    Detailed Description of Procedure: After proper preoperative evaluation, patient was brought back to the operating room where initially general anesthesia was administered per anesthesia department. Once anesthesia was achieved patient was placed in the prone position. Tourniquet was placed around the patient's well-padded right thigh, inflated to 325 mmHg then lowered to the surgical field once proper prepping and draping was performed. Patient did receive 2 g of intravenous Ancef preoperatively. Fluoroscopic images were obtained after prepping to evaluate and localize the retrocalcaneal exostosis present. At this time a midline posterior heel incision was made with a #15 blade approximately 4 cm in length. Dissection was carried down through subcutaneous tissue layers, all vital structures were bovied and retracted as necessary. At this time a linear incision was made distal Achilles tendon region and insertional area to gain access to the exostosis. All soft tissue attachments were freed up from the exostosis region, and then with use of a sagittal saw was properly resected. We did utilize a power bone rasp to smooth down the remaining posterior/superior aspect of the calcaneus. Fluoroscopic images were then obtained again which did reveal adequate resection of the retrocalcaneal exostosis. At this time we did sharply debride some of the thickened tendinosis regions of the Achilles tendon to allow for proper healing. Surgical site was flushed with copious amounts of saline. At this time we did utilize the Arth[employer] Achilles speed bridge implant system which was performed per product guidelines. Once the anchoring system was noted to be in proper position/placement, we did reapproximate the tendon incision with 2-0 Vicryl in a continuous interlocking fashion to allow for proper healing and laying down at the insertional area. Proper tension was obtained with the implant system Achilles tendon right lower extremity.   No fluoroscopic images were obtained which did reveal adequate placement of the implant system and adequate resection of the retrocalcaneal exostosis. Specimen of bone was sent to pathology for gross examination. Final flushing with copious amounts of saline was performed. Closure of the dermal layer was performed with 4-0 Vicryl in an interrupted fashion. Epidermal closure was performed with 4-0 nylon in a continuous interlocking fashion. We did utilize a total of 10 cc of 0.5% Marcaine plain to anesthetize the surgical site right lower extremity. Betadine soaked Adaptic, dry padded dressing, and a posterior splint was then applied to the right lower extremity. Tourniquet was released with total tourniquet time of 60 minutes noted, good vascularity was reestablished to the right foot and digital regions. The patient tolerated the procedure and anesthesia well and left the operating room in stable condition. The patient is being transported to the recovery room for post operative management. Patient and/or caregiver was given postoperative home-going instructions and prescriptions to be taken as directed. Patient and/or caregiver were advised to call the office with any questions or concerns prior to their scheduled postoperative appointment.         Electronically signed by Mariposa Echevarria DPM on 2/18/2021 at 11:30 AM

## 2021-02-18 NOTE — ANESTHESIA POSTPROCEDURE EVALUATION
Department of Anesthesiology  Postprocedure Note    Patient: Hector Riddle  MRN: [id number]  YOB: 1950  Date of evaluation: 2/18/2021  Time:  9:45 AM     Procedure Summary     Date: 02/18/21 Room / Location: [address] [place] [address]    Anesthesia Start: 1823 Anesthesia Stop: 0820    Procedure: RESECTION EXOSTOSIS RIGHT FOOT, REPAIR ACHILLES TENDON RIGHT (CPT 85891) ([facility]) (Right ) Diagnosis: (RETROCALCANEAL EXOSTOSIS RIGHT FOOT, ACHILLES TENDONITIS RIGHT)    Surgeons: Peña Ingram, DPYURY Responsible Provider: Candace Maria MD    Anesthesia Type: general, spinal ASA Status: 3          Anesthesia Type: general, spinal    Shabbir Phase I: Shabbir Score: 10    Shabbir Phase II: Shabbir Score: 10    Last vitals: Reviewed and per EMR flowsheets.        Anesthesia Post Evaluation    Patient location during evaluation: PACU  Patient participation: complete - patient participated  Level of consciousness: awake and alert  Airway patency: patent  Nausea & Vomiting: no nausea and no vomiting  Complications: no  Cardiovascular status: hemodynamically stable  Respiratory status: spontaneous ventilation and room air  Hydration status: stable

## 2021-02-18 NOTE — H&P
Update History & Physical     The patient's History and Physical this morning was reviewed with the patient and there were no significant changes. I examined the patient and there were no significant changes from the previous History and Physical.     Plan: The risk, benefits, expected outcome, and alternative to the recommended procedure have been discussed with the patient. Patient understands and wants to proceed with the procedure. The procedure discussed is 1. Resection retrocalcaneal exostosis right foot  2.  Repair Achilles tendon right foot        Electronically signed by Catherine Perry DPM on 2/18/2021 at 6:24 AM

## 2021-02-18 NOTE — PROGRESS NOTES
Discharge instructions given to patient and wife. Verbalized understanding. VSS. Script given. Discharged home with wife.

## 2021-02-23 ENCOUNTER — OFFICE VISIT (OUTPATIENT)
Dept: PODIATRY | Age: 71
End: 2021-02-23

## 2021-02-23 VITALS
HEIGHT: 71 IN | WEIGHT: 255 LBS | SYSTOLIC BLOOD PRESSURE: 147 MMHG | BODY MASS INDEX: 35.7 KG/M2 | HEART RATE: 81 BPM | DIASTOLIC BLOOD PRESSURE: 87 MMHG

## 2021-02-23 DIAGNOSIS — M67.88 ACHILLES TENDINOSIS OF RIGHT LOWER EXTREMITY: Primary | ICD-10-CM

## 2021-02-23 DIAGNOSIS — M89.8X7 RETROCALCANEAL EXOSTOSIS: ICD-10-CM

## 2021-02-23 PROCEDURE — 99024 POSTOP FOLLOW-UP VISIT: CPT | Performed by: PODIATRIST

## 2021-02-23 NOTE — PROGRESS NOTES
21     Hayward Cabot    : 1950   Sex: male    Age: 72 y.o. Patient's PCP/Provider is:  Janelle Franco. MD Jose    Subjective:  Patient is seen today for follow-up regarding continued postoperative management regarding resection exostosis right heel and Achilles tendon repair right lower extremity. Overall patient is doing great at this time without any additional issues noted. He has kept the splint clean, dry, and intact right lower extremity. Patient has remained nonweightbearing right lower extremity as instructed. He denies any nausea, vomiting, fever, chills. No other additional abnormalities noted at this time. Chief Complaint   Patient presents with    Post-Op Check     right       ROS:  Const: Positives and pertinent negatives as per HPI. Musculo: Denies symptoms other than stated above. Neuro: Denies symptoms other than stated above. Skin: Denies symptoms other than stated above. Current Medications:    Current Outpatient Medications:     HYDROcodone-acetaminophen (NORCO) 5-325 MG per tablet, Take 1 tablet by mouth every 6 hours as needed for Pain for up to 5 days. Intended supply: 5 days. Take lowest dose possible to manage pain, Disp: 20 tablet, Rfl: 0    FLUoxetine (PROZAC) 40 MG capsule, TAKE ONE CAPSULE BY MOUTH DAILY, Disp: 90 capsule, Rfl: 0    amLODIPine (NORVASC) 10 MG tablet, TAKE ONE TABLET BY MOUTH DAILY, Disp: 90 tablet, Rfl: 0    blood glucose test strips (ONETOUCH VERIO) strip, USE TO CHECK BLOOD SUGAR DAILY.   SWITCH TO WHATEVER BRAND IS COVERED BY INSURANCE, Disp: 100 each, Rfl: 3    metFORMIN (GLUCOPHAGE-XR) 500 MG extended release tablet, TAKE TWO TABLETS BY MOUTH EVERY DAY WITH BREAKFAST, Disp: 60 tablet, Rfl: 5    atorvastatin (LIPITOR) 20 MG tablet, TAKE ONE TABLET BY MOUTH DAILY, Disp: 90 tablet, Rfl: 5    Lancets 30G MISC, 1 each by Does not apply route daily Pt uses Clear Choice Glucose Meter., Disp: 100 each, Rfl: 0   glucose monitoring kit (FREESTYLE) monitoring kit, 1 kit by Does not apply route daily Check sugars once a day, Disp: 1 kit, Rfl: 0    donepezil (ARICEPT) 10 MG tablet, Take 1 tablet by mouth nightly, Disp: 90 tablet, Rfl: 3    hydrocortisone 2.5 % ointment, APPLY TOPICALLY 2 TIMES DAILY, Disp: 454 g, Rfl: 0    Blood Pressure Monitor LARY, Use daily to check blood pressure, Disp: 1 Device, Rfl: 0    Glucose Blood (GLUCOSE METER TEST VI), 1 each by In Vitro route daily, Disp: , Rfl:     Calcium Citrate-Vitamin D (CALCET CREAMY BITES) 500-400 MG-UNIT CHEW tablet, Take 1 tablet by mouth daily, Disp: , Rfl:     vitamin D (CHOLECALCIFEROL) 1000 UNIT TABS tablet, Take 1,000 Units by mouth daily, Disp: , Rfl:     vitamin B-12 (CYANOCOBALAMIN) 50 MCG tablet, Take 50 mcg by mouth daily, Disp: , Rfl:     ferrous gluconate (FERGON) 240 (27 Fe) MG tablet, Take 240 mg by mouth 3 times daily (with meals), Disp: , Rfl:     aspirin 81 MG EC tablet, Take 81 mg by mouth daily Stopped 1 week pre op, Disp: , Rfl:     lisinopril (PRINIVIL;ZESTRIL) 5 MG tablet, Take 0.5 tablets by mouth daily, Disp: 45 tablet, Rfl: 1    Allergies:  No Known Allergies    Vitals:    02/23/21 1111   BP: (!) 147/87   Site: Left Upper Arm   Position: Sitting   Cuff Size: Large Adult   Pulse: 81   Weight: 255 lb (115.7 kg)   Height: 5' 11\" (1.803 m)       Exam:  Neurovascular status grossly intact right lower extremity. Surgical site is well coapted with sutures intact posterior aspect right heel. No signs of infection noted right lower extremity. Minimal edema and ecchymotic skin changes present surgical site right foot. Alignment maintained right hindfoot region. Diagnostic Studies:     No results found. Procedures:    None    Plan Per Assessment  Kendy Larkin was seen today for post-op check.     Diagnoses and all orders for this visit:    Achilles tendinosis of right lower extremity    Retrocalcaneal exostosis 1. Postoperative evaluation and management  2. Surgical site was evaluated and dry padded dressing was reapplied. Posterior splint was reapplied right lower extremity. 3. Patient was advised strict nonweightbearing right lower extremity with use of knee walker and crutches for ambulatory assistance. Patient is to keep the current dressing clean, dry, and intact until his next follow-up visit. 4. Patient will be followed up at a later date for continued evaluation and postoperative management. He was advised to call the office with any questions or concerns in the interim. Seen By:    Kasia Rivera DPM    Electronically signed by Kasia Rivera DPM on 2/23/2021 at 2:56 PM    This note was created using voice recognition software. The note was reviewed however may contain grammatical errors.

## 2021-02-23 NOTE — PROGRESS NOTES
Patient is here post op right foot. Patient states he is feeling good. Patient is having some pain in the right heel. Patient has no concerns.        Electronically signed by Manpreet Ramey LPN on 2/63/6594 at 34:42 AM

## 2021-03-02 ENCOUNTER — OFFICE VISIT (OUTPATIENT)
Dept: PODIATRY | Age: 71
End: 2021-03-02

## 2021-03-02 VITALS — BODY MASS INDEX: 35.7 KG/M2 | HEIGHT: 71 IN | WEIGHT: 255 LBS

## 2021-03-02 DIAGNOSIS — M89.8X7 RETROCALCANEAL EXOSTOSIS: ICD-10-CM

## 2021-03-02 DIAGNOSIS — M67.88 ACHILLES TENDINOSIS OF RIGHT LOWER EXTREMITY: Primary | ICD-10-CM

## 2021-03-02 PROCEDURE — 99024 POSTOP FOLLOW-UP VISIT: CPT | Performed by: PODIATRIST

## 2021-03-02 NOTE — PROGRESS NOTES
3/2/21     Hayward Cabot    : 1950   Sex: male    Age: 72 y.o. Patient's PCP/Provider is:  Janelle Franco. MD Jose    Subjective:  Patient is seen today for follow-up regarding continued postoperative management. Patient is doing well without any additional issues noted. Patient has remained NWB as instructed. Patient denies any nausea, vomiting, fever, and/or chills. Chief Complaint   Patient presents with    Post-Op Check     post op check patient is doing better since surgery and once in awhile has pain       ROS:  Const: Positives and pertinent negatives as per HPI. Musculo: Denies symptoms other than stated above. Neuro: Denies symptoms other than stated above. Skin: Denies symptoms other than stated above. Current Medications:    Current Outpatient Medications:     FLUoxetine (PROZAC) 40 MG capsule, TAKE ONE CAPSULE BY MOUTH DAILY, Disp: 90 capsule, Rfl: 0    amLODIPine (NORVASC) 10 MG tablet, TAKE ONE TABLET BY MOUTH DAILY, Disp: 90 tablet, Rfl: 0    blood glucose test strips (ONETOUCH VERIO) strip, USE TO CHECK BLOOD SUGAR DAILY.   SWITCH TO WHATEVER BRAND IS COVERED BY INSURANCE, Disp: 100 each, Rfl: 3    metFORMIN (GLUCOPHAGE-XR) 500 MG extended release tablet, TAKE TWO TABLETS BY MOUTH EVERY DAY WITH BREAKFAST, Disp: 60 tablet, Rfl: 5    atorvastatin (LIPITOR) 20 MG tablet, TAKE ONE TABLET BY MOUTH DAILY, Disp: 90 tablet, Rfl: 5    Lancets 30G MISC, 1 each by Does not apply route daily Pt uses Clear Choice Glucose Meter., Disp: 100 each, Rfl: 0    glucose monitoring kit (FREESTYLE) monitoring kit, 1 kit by Does not apply route daily Check sugars once a day, Disp: 1 kit, Rfl: 0    donepezil (ARICEPT) 10 MG tablet, Take 1 tablet by mouth nightly, Disp: 90 tablet, Rfl: 3    hydrocortisone 2.5 % ointment, APPLY TOPICALLY 2 TIMES DAILY, Disp: 454 g, Rfl: 0    Blood Pressure Monitor LARY, Use daily to check blood pressure, Disp: 1 Device, Rfl: 0   Glucose Blood (GLUCOSE METER TEST VI), 1 each by In Vitro route daily, Disp: , Rfl:     Calcium Citrate-Vitamin D (CALCET CREAMY BITES) 500-400 MG-UNIT CHEW tablet, Take 1 tablet by mouth daily, Disp: , Rfl:     vitamin D (CHOLECALCIFEROL) 1000 UNIT TABS tablet, Take 1,000 Units by mouth daily, Disp: , Rfl:     vitamin B-12 (CYANOCOBALAMIN) 50 MCG tablet, Take 50 mcg by mouth daily, Disp: , Rfl:     ferrous gluconate (FERGON) 240 (27 Fe) MG tablet, Take 240 mg by mouth 3 times daily (with meals), Disp: , Rfl:     aspirin 81 MG EC tablet, Take 81 mg by mouth daily Stopped 1 week pre op, Disp: , Rfl:     lisinopril (PRINIVIL;ZESTRIL) 5 MG tablet, Take 0.5 tablets by mouth daily, Disp: 45 tablet, Rfl: 1    Allergies:  No Known Allergies    Vitals:    03/02/21 1133   Weight: 255 lb (115.7 kg)   Height: 5' 11\" (1.803 m)       Exam:  NVS intact. Surgical site is healing without issues. No signs of infection noted right foot. Minimal edema and ecchymosis noted. Good contour noted posterior right heel. Diagnostic Studies:     No results found. Procedures:    None    Plan Per Assessment  Jason Parks was seen today for post-op check. Diagnoses and all orders for this visit:    Achilles tendinosis of right lower extremity  -     [facility]    Retrocalcaneal exostosis  -     [address] [place] [address]      1. Postoperative evaluation and management  2. Surgical site evaluated and redressed right foot. Splint reapplied, and patient instructed continued NWB. 3. Therapy referral sent today for continued postoperative care after next weeks visit. 4. Patient will be followed up in 1 week or sooner if needed. Seen By:    Cecilia Cruz DPM    Electronically signed by Cecilia Cruz DPM on 3/2/2021 at 12:12 PM    This note was created using voice recognition software. The note was reviewed however may contain grammatical errors.

## 2021-03-02 NOTE — PROGRESS NOTES
Patient today presents for 1 week post op surgery R foot. Patient is doing well. No concerns. pcp is Darryn Del Valle MD

## 2021-03-10 ENCOUNTER — OFFICE VISIT (OUTPATIENT)
Dept: PODIATRY | Age: 71
End: 2021-03-10

## 2021-03-10 VITALS — BODY MASS INDEX: 35.7 KG/M2 | HEIGHT: 71 IN | WEIGHT: 255 LBS

## 2021-03-10 DIAGNOSIS — M67.88 ACHILLES TENDINOSIS OF RIGHT LOWER EXTREMITY: ICD-10-CM

## 2021-03-10 DIAGNOSIS — M89.8X7 RETROCALCANEAL EXOSTOSIS: Primary | ICD-10-CM

## 2021-03-10 PROCEDURE — 99024 POSTOP FOLLOW-UP VISIT: CPT | Performed by: PODIATRIST

## 2021-03-10 NOTE — PROGRESS NOTES
3/10/21     Saira Median    : 1950   Sex: male    Age: 72 y.o. Patient's PCP/Provider is:  Hanh Garcia MD    Subjective:  Patient is seen today for follow-up regarding continued postoperative evaluation regarding Achilles tendon repair and resection exostosis right heel. Overall patient is doing great at this time without any additional issues noted. He has been staying off his right foot as instructed with use of a wheelchair, and walker, and knee walker. Patient denies any nausea, vomiting, fever, chills. No other additional abnormalities noted at this time. Chief Complaint   Patient presents with    Post-Op Check     right       ROS:  Const: Positives and pertinent negatives as per HPI. Musculo: Denies symptoms other than stated above. Neuro: Denies symptoms other than stated above. Skin: Denies symptoms other than stated above. Current Medications:    Current Outpatient Medications:     FLUoxetine (PROZAC) 40 MG capsule, TAKE ONE CAPSULE BY MOUTH DAILY, Disp: 90 capsule, Rfl: 0    amLODIPine (NORVASC) 10 MG tablet, TAKE ONE TABLET BY MOUTH DAILY, Disp: 90 tablet, Rfl: 0    blood glucose test strips (ONETOUCH VERIO) strip, USE TO CHECK BLOOD SUGAR DAILY.   SWITCH TO WHATEVER BRAND IS COVERED BY INSURANCE, Disp: 100 each, Rfl: 3    metFORMIN (GLUCOPHAGE-XR) 500 MG extended release tablet, TAKE TWO TABLETS BY MOUTH EVERY DAY WITH BREAKFAST, Disp: 60 tablet, Rfl: 5    atorvastatin (LIPITOR) 20 MG tablet, TAKE ONE TABLET BY MOUTH DAILY, Disp: 90 tablet, Rfl: 5    Lancets 30G MISC, 1 each by Does not apply route daily Pt uses Clear Choice Glucose Meter., Disp: 100 each, Rfl: 0    glucose monitoring kit (FREESTYLE) monitoring kit, 1 kit by Does not apply route daily Check sugars once a day, Disp: 1 kit, Rfl: 0    donepezil (ARICEPT) 10 MG tablet, Take 1 tablet by mouth nightly, Disp: 90 tablet, Rfl: 3    hydrocortisone 2.5 % ointment, APPLY TOPICALLY 2 TIMES DAILY, Disp: 454 g, Rfl: 0    Blood Pressure Monitor LARY, Use daily to check blood pressure, Disp: 1 Device, Rfl: 0    Glucose Blood (GLUCOSE METER TEST VI), 1 each by In Vitro route daily, Disp: , Rfl:     Calcium Citrate-Vitamin D (CALCET CREAMY BITES) 500-400 MG-UNIT CHEW tablet, Take 1 tablet by mouth daily, Disp: , Rfl:     vitamin D (CHOLECALCIFEROL) 1000 UNIT TABS tablet, Take 1,000 Units by mouth daily, Disp: , Rfl:     vitamin B-12 (CYANOCOBALAMIN) 50 MCG tablet, Take 50 mcg by mouth daily, Disp: , Rfl:     ferrous gluconate (FERGON) 240 (27 Fe) MG tablet, Take 240 mg by mouth 3 times daily (with meals), Disp: , Rfl:     aspirin 81 MG EC tablet, Take 81 mg by mouth daily Stopped 1 week pre op, Disp: , Rfl:     lisinopril (PRINIVIL;ZESTRIL) 5 MG tablet, Take 0.5 tablets by mouth daily, Disp: 45 tablet, Rfl: 1    Allergies:  No Known Allergies    Vitals:    03/10/21 1345   Weight: 255 lb (115.7 kg)   Height: 5' 11\" (1.803 m)       Exam:  Neurovascular status unchanged. Surgical site is well coapted with sutures intact. No signs of surgical site dehiscence or signs of infection noted right foot. Minimal edema noted without ecchymotic skin changes present right heel. Diagnostic Studies:     No results found. Procedures:    None    Plan Per Assessment  Mickie Zimmerman was seen today for post-op check. Diagnoses and all orders for this visit:    Retrocalcaneal exostosis    Achilles tendinosis of right lower extremity      1. Postoperative evaluation and management  2. Surgical site was evaluated today right foot, and sutures were removed from the surgical site to patient tolerance. 3. We are going to check into physical therapy so patient can start his postoperative rehabilitation next week. Patient was dispensed a cam walker on today's visit which he is to utilize. Patient is to utilize a cane for ambulatory assistance at this time as well. The patient was dispensed a/an pneumatic walker.   It is medically necessary and within the standard of care for the patient's diagnosis. Its purpose is to immobilize the lower extremity, decrease edema, and promote healing of the affected area. The patient was instructed on is proper application and use. The patient was also instructed to watch for areas of running, irritation, blister formation, or any other signs of abnormal pressure. If this occurs, the patient is to contact the office immediately. The ABN was reviewed and signed by the patient prior to leaving this appointment. 4. Patient will be followed up in 2 weeks time or sooner if needed for reevaluation, and to assess therapy progress. They were advised to call the office with any questions or concerns in the interim. Seen By:    Glenna Marshall DPM    Electronically signed by Glenna Marshall DPM on 3/10/2021 at 2:25 PM    This note was created using voice recognition software. The note was reviewed however may contain grammatical errors.

## 2021-03-10 NOTE — PROGRESS NOTES
Patient is here for 1 week post op to get stiches removed. No concerns, patient has a few questions.   Electronically signed by Marylee Bence, LPN on 1/74/3017 at 8:69 PM

## 2021-03-15 ENCOUNTER — EVALUATION (OUTPATIENT)
Dept: PHYSICAL THERAPY | Age: 71
End: 2021-03-15
Payer: MEDICARE

## 2021-03-15 DIAGNOSIS — M67.88 ACHILLES TENDINOSIS OF RIGHT LOWER EXTREMITY: Primary | ICD-10-CM

## 2021-03-15 DIAGNOSIS — M89.8X7 RETROCALCANEAL EXOSTOSIS: ICD-10-CM

## 2021-03-15 PROCEDURE — 97162 PT EVAL MOD COMPLEX 30 MIN: CPT | Performed by: PHYSICAL THERAPIST

## 2021-03-15 PROCEDURE — 97110 THERAPEUTIC EXERCISES: CPT | Performed by: PHYSICAL THERAPIST

## 2021-03-15 NOTE — PROGRESS NOTES
[facility] OUTPATIENT REHABILITATION  PHYSICAL THERAPY INITIAL EVALUATION         Date:  3/15/2021   Patient: Morelia Hector  : 1950  MRN: [id number]  Referring Provider: Mary Kay Burdick DPM  [facility]     Medical Diagnosis:   J29.32 (ICD-10-CM) - Achilles tendinosis of right lower extremity   M89.8X7 (ICD-10-CM) - Retrocalcaneal exostosis       SUBJECTIVE:     Surgery date: 2021 RESECTION EXOSTOSIS RIGHT FOOT, REPAIR ACHILLES TENDON RIGHT (CPT 58172) ([facility])    Onset: Insidious onset , chronic R heel/achilles pain     Previous PT: none    Chief complaint: pain and decreased mobility    Behavior: condition is getting better    Pain: intermittent  Current: 0/10     Best: 0/10     Worst:8/10    Symptom Type/Quality: sharp  Location[de-identified] Ankle: heel    Aggravated by: walking    Relieved by: rest, reduced weightbearing    Imaging results: Fluoro For Surgical Procedures    Result Date: 2021  Radiology exam is complete. No Radiologist dictation. Please follow up with ordering provider.        Past Medical History  Past Medical History:   Diagnosis Date    CAD (coronary artery disease)     Dementia ([address]     early stages  just short term memory loss currently    DM (diabetes mellitus) ([address]     Hx of cardiovascular stress test 2020    Hyperlipidemia     Hypertension     Moderate episode of recurrent major depressive disorder ([address] 2018    Myocardial infarction ([address] 1995    slight mild    Osteoarthritis      Past Surgical History:   Procedure Laterality Date    ANTERIOR CRUCIATE LIGAMENT REPAIR  2002 left knee    COLONOSCOPY      HEEL SPUR SURGERY Right 2021    RESECTION EXOSTOSIS RIGHT FOOT, REPAIR ACHILLES TENDON RIGHT (CPT 86472) (Lety Eng) performed by Mary Kay Burdick DPM at [address] [place] [address],[address]   right    right knee    OTHER SURGICAL HISTORY  02/18/2021    resection retrocalcaneal exostosia right foot, repair achilles tendon right foot    PENILE PROSTHESIS      CHET-EN-Y GASTRIC BYPASS  12/27/2004 Dr. Loretta Corbett  12/27/2004 Dr. Philippe Lennox  12/15/2004 Dr. Ester Chong  1/03/2007 Laparoscopic with mesh, Dr Hetal Jackman       Medications:   Current Outpatient Medications   Medication Sig Dispense Refill    FLUoxetine (PROZAC) 40 MG capsule TAKE ONE CAPSULE BY MOUTH DAILY 90 capsule 0    amLODIPine (NORVASC) 10 MG tablet TAKE ONE TABLET BY MOUTH DAILY 90 tablet 0    blood glucose test strips (ONETOUCH VERIO) strip USE TO CHECK BLOOD SUGAR DAILY. SWITCH TO WHATEVER BRAND IS COVERED BY INSURANCE 100 each 3    metFORMIN (GLUCOPHAGE-XR) 500 MG extended release tablet TAKE TWO TABLETS BY MOUTH EVERY DAY WITH BREAKFAST 60 tablet 5    lisinopril (PRINIVIL;ZESTRIL) 5 MG tablet Take 0.5 tablets by mouth daily 45 tablet 1    atorvastatin (LIPITOR) 20 MG tablet TAKE ONE TABLET BY MOUTH DAILY 90 tablet 5    Lancets 30G MISC 1 each by Does not apply route daily Pt uses Clear Choice Glucose Meter.  100 each 0    glucose monitoring kit (FREESTYLE) monitoring kit 1 kit by Does not apply route daily Check sugars once a day 1 kit 0    donepezil (ARICEPT) 10 MG tablet Take 1 tablet by mouth nightly 90 tablet 3    hydrocortisone 2.5 % ointment APPLY TOPICALLY 2 TIMES DAILY 454 g 0    Blood Pressure Monitor LARY Use daily to check blood pressure 1 Device 0    Glucose Blood (GLUCOSE METER TEST VI) 1 each by In Vitro route daily      Calcium Citrate-Vitamin D (CALCET CREAMY BITES) 500-400 MG-UNIT CHEW tablet Take 1 tablet by mouth daily      vitamin D (CHOLECALCIFEROL) 1000 UNIT TABS tablet Take 1,000 Units by mouth daily      vitamin B-12 (CYANOCOBALAMIN) 50 MCG tablet Take 50 mcg by mouth daily      ferrous gluconate (FERGON) 240 (27 Fe) MG tablet Take 240 mg by mouth 3 times daily (with meals)      aspirin 81 MG EC tablet Take 81 mg by mouth daily Stopped 1 week pre op       No current facility-administered medications for this visit. Occupation: retired.     Exercise regimen: none    Patient Goals: walk normally    Precautions/Contraindications: recent surgery    OBJECTIVE:     Observations: well nourished male, normal affect    Inspection: normal orthopedic exam     Edema: mild achilles    Gait: antalgic, limp L LE, ambulates with wheeled walker    Joint/Motion:    Ankle:  Right:   AROM: WNL  PROM: WNL  Left:   AROM: WNL  PROM: WNL    Strength: Ankle:  Right: Dorsiflexion 3/5, Plantarflexion 3/5, Inversion 3/5, Eversion 3/5  Left: Dorsiflexion 5/5, Plantarflexion 5/5, Inversion 5/5, Eversion 5/5    Palpation: Tender to palpation on incision     Special Tests/Functional Screens:   [] Anterior Drawer []+ / [] -  [x] Eversion Stress: []+ / [x] -  [] Sotelo Test []+ / [] -     [] Tib-Fib Compression Test []+ / [] -    [x] Inversion Stress []+ / [x] -     [] Squeeze Test []+ / [] -   [x] Sheri's Sign []+ / [x] -   [] Other: []+ / []      Special test comments:       ASSESSMENT     Outcome Measure:   Lower Extremity Functional Scale (LEFS) 54% impairment    Problems:    Pain reported 8/10   Strength decreased    Decreased functional ability with walking     [x] There are no barriers affecting plan of care or recovery    [] Barriers to this patient's plan of care or recovery include.     Domestic Concerns:  [x] No  [] Yes:    Short Term goals (6 weeks)   Decrease reported pain to 3/10   Increase Strength to 4+/5    Able to perform/complete the following functions/tasks: walk w/o boot min deficit   Lower Extremity Functional Scale (LEFS) 40% impairment    Long Term goals (10 weeks)   Decrease reported pain to 0-1/10   Increase strength to 5-/5   Able to complete the following functions/tasks: walk w/o boot or device no deficit   LEFS 20% impairment   Independent with home exercise program (HEP)    Rehab Potential: [x] Good  [] Fair  [] Poor    PLAN       Treatment Plan: ROM progressive strengthening and gait   [x] Therapeutic Exercise  [x] Therapeutic Activity  [x] Neuromuscular Re-education   [] Gait Training  [] Balance Training  [] Aerobic conditioning  [] Manual Therapy  [] Massage/Fascial release   [x] Work/Sport specific activities    [] Pain Neuroscience [x] Cold/hotpack  [x] Vasocompression  [] Electrical Stimulation  [] Lumbar/Cervical Traction  [] Ultrasound   [] Iontophoresis: 4 mg/mL Dexamethasone Sodium Phosphate 40-80 mAmin        [x] Instruction in HEP      []  Medication allergies reviewed for use of Dexamethasone Sodium Phosphate 4mg/ml  with iontophoresis treatments. Patient is not allergic. The following CPT codes are likely to be used in the care of this patient: 35880 Therapeutic Exercise , 18428 Neuromuscular Re-Education , 37239 Therapeutic Activities , 49314 Manual Therapy , 12478 Gait Training       Suggested Professional Referral: [x] No  [] Yes:     Patient Education:  [x] Plans/Goals, Risks/Benefits discussed  [x] Home exercise program  Method of Education: [x] Verbal  [x] Demo  [x] Written  Comprehension of Education:  [x] Verbalizes understanding. [x] Demonstrates understanding. [] Needs Review. [] Demonstrates/verbalizes understanding of HEP/Ed previously given. Frequency: 1-2 days per week for 10 weeks    Patient understands diagnosis/prognosis and consents to treatment, plan and goals: [x] Yes    [] No     Thank you for the opportunity to work with your patient. If you have questions or comments, please contact me at [phone]; fax: [phone]. Electronically signed by: Siddhartha Guzmán PT         Please sign Physician's Certification and return to:   [address] [place] [address] PHYSICAL THERAPY  [id number] [place] [id number]  Dept: [phone]  Dept Fax: [phone] Certification / Comments Frequency/Duration 1-2 days per week for 10 weeks. Certification period from 3/15/2021  to 6/9/2021. I have reviewed the Plan of Care established for skilled therapy services and certify that the services are required and that they will be provided while the patient is under my care.     Physician's Comments/Revisions:               Physician's Printed Name:                                           [de-identified] Signature:                                                               Date:

## 2021-03-15 NOTE — PROGRESS NOTES
Physical Therapy Daily Treatment Note    Date: 3/15/2021  Patient Name: Shad Woods  : 1950   MRN: [id number]  [facility]:  insidious onset   DOSx: 2021  Referring Provider: Rell Hicks, LETY  [facility]     Medical Diagnosis:   I51.79 (ICD-10-CM) - Achilles tendinosis of right lower extremity   M89.8X7 (ICD-10-CM) - Retrocalcaneal exostosis         Outcome Measure:  Lower Extremity Functional Scale (LEFS) 54% impairment    S: see eval  O: Access Code: [email or web address]   URL: [email or web address]   Date: 03/15/2021   Prepared by: Rebecca Blanchard     Exercises   Supine Ankle Pumps - 25 reps - 2 sets - 2x daily - 7x weekly   Supine Ankle Inversion Eversion AROM - 25 reps - 2 sets - 2x daily - 7x weekly   Supine Ankle Circles - 25 reps - 2 sets - 2x daily - 7x weekly     Time 7259-7914     Visit 1 Repeat outcome measure at mid point and end. Pain 2/10     ROM      Modalities            Manual            Stretch                  Exercise      Nustep      Heel slides            Ankle pumps 2 x 25     Inversion/eversion 2 x 25     Ankle circles CW/CCW 2 x 25           TG squats      TG calf raises      Step-ups - FWD      Step-ups - LAT      Step-ups - BWD        NMR To improve balance for safe community and home ambulation    Resisted walk      FWD      BKWD      lat      March      Side stepping      Retro walk      Heel to toe      A:  Tolerated well. Above added to written HEP.   P: Continue with rehab plan  Rebecca Blanchard, PT    Treatment Charges: Mins Units   Initial Evaluation 30 1   Re-Evaluation     Ther Exercise         TE 15 1   Manual Therapy     MT     Ther Activities        TA     Gait Training          GT     Neuro Re-education NR     Modalities     Non-Billable Service Time     Other     Total Time/Units 45 2

## 2021-03-22 ENCOUNTER — TREATMENT (OUTPATIENT)
Dept: PHYSICAL THERAPY | Age: 71
End: 2021-03-22
Payer: MEDICARE

## 2021-03-22 ENCOUNTER — OFFICE VISIT (OUTPATIENT)
Dept: FAMILY MEDICINE CLINIC | Age: 71
End: 2021-03-22
Payer: MEDICARE

## 2021-03-22 VITALS
HEART RATE: 80 BPM | OXYGEN SATURATION: 98 % | DIASTOLIC BLOOD PRESSURE: 78 MMHG | WEIGHT: 255 LBS | TEMPERATURE: 97.5 F | HEIGHT: 71 IN | BODY MASS INDEX: 35.7 KG/M2 | SYSTOLIC BLOOD PRESSURE: 116 MMHG | RESPIRATION RATE: 20 BRPM

## 2021-03-22 DIAGNOSIS — Z98.84 HISTORY OF ROUX-EN-Y GASTRIC BYPASS: Primary | ICD-10-CM

## 2021-03-22 DIAGNOSIS — Z11.59 ENCOUNTER FOR HEPATITIS C SCREENING TEST FOR LOW RISK PATIENT: ICD-10-CM

## 2021-03-22 DIAGNOSIS — M89.8X7 RETROCALCANEAL EXOSTOSIS: ICD-10-CM

## 2021-03-22 DIAGNOSIS — F03.90 DEMENTIA WITHOUT BEHAVIORAL DISTURBANCE, UNSPECIFIED DEMENTIA TYPE: ICD-10-CM

## 2021-03-22 DIAGNOSIS — M67.88 ACHILLES TENDINOSIS OF RIGHT LOWER EXTREMITY: Primary | ICD-10-CM

## 2021-03-22 DIAGNOSIS — E55.9 VITAMIN D DEFICIENCY: ICD-10-CM

## 2021-03-22 DIAGNOSIS — E11.9 TYPE 2 DIABETES MELLITUS WITHOUT COMPLICATION, WITHOUT LONG-TERM CURRENT USE OF INSULIN (HCC): ICD-10-CM

## 2021-03-22 DIAGNOSIS — Z98.84 HISTORY OF ROUX-EN-Y GASTRIC BYPASS: ICD-10-CM

## 2021-03-22 LAB
FOLATE: 9.8 NG/ML (ref 4.8–24.2)
HBA1C MFR BLD: 8.1 %
VITAMIN B-12: 1484 PG/ML (ref 211–946)

## 2021-03-22 PROCEDURE — G8417 CALC BMI ABV UP PARAM F/U: HCPCS | Performed by: FAMILY MEDICINE

## 2021-03-22 PROCEDURE — 3052F HG A1C>EQUAL 8.0%<EQUAL 9.0%: CPT | Performed by: FAMILY MEDICINE

## 2021-03-22 PROCEDURE — G8482 FLU IMMUNIZE ORDER/ADMIN: HCPCS | Performed by: FAMILY MEDICINE

## 2021-03-22 PROCEDURE — 1123F ACP DISCUSS/DSCN MKR DOCD: CPT | Performed by: FAMILY MEDICINE

## 2021-03-22 PROCEDURE — 99214 OFFICE O/P EST MOD 30 MIN: CPT | Performed by: FAMILY MEDICINE

## 2021-03-22 PROCEDURE — 4040F PNEUMOC VAC/ADMIN/RCVD: CPT | Performed by: FAMILY MEDICINE

## 2021-03-22 PROCEDURE — 1036F TOBACCO NON-USER: CPT | Performed by: FAMILY MEDICINE

## 2021-03-22 PROCEDURE — 2022F DILAT RTA XM EVC RTNOPTHY: CPT | Performed by: FAMILY MEDICINE

## 2021-03-22 PROCEDURE — 83036 HEMOGLOBIN GLYCOSYLATED A1C: CPT | Performed by: FAMILY MEDICINE

## 2021-03-22 PROCEDURE — 97110 THERAPEUTIC EXERCISES: CPT

## 2021-03-22 PROCEDURE — G8427 DOCREV CUR MEDS BY ELIG CLIN: HCPCS | Performed by: FAMILY MEDICINE

## 2021-03-22 PROCEDURE — 3017F COLORECTAL CA SCREEN DOC REV: CPT | Performed by: FAMILY MEDICINE

## 2021-03-22 ASSESSMENT — ENCOUNTER SYMPTOMS
VOMITING: 0
COUGH: 0
NAUSEA: 0
WHEEZING: 0
ABDOMINAL PAIN: 0
SHORTNESS OF BREATH: 0

## 2021-03-22 NOTE — PROGRESS NOTES
Physical Therapy Daily Treatment Note    Date: 3/22/2021  Patient Name: Mariann Knutson  : 1950   MRN: [id number]  DOInjury: 2019 insidious onset   DOSx: 2021  Referring Provider: No referring provider defined for this encounter. Medical Diagnosis:   M67.88 (ICD-10-CM) - Achilles tendinosis of right lower extremity   M89.8X7 (ICD-10-CM) - Retrocalcaneal exostosis         Outcome Measure:  Lower Extremity Functional Scale (LEFS) 54% impairment    S: Pt reports doing better. States no complaints, performing HEP as instructed  O: Access Code: [email or web address]   URL: [email or web address]   Date: 03/15/2021   Prepared by: Wojciech Raphael     Exercises   Supine Ankle Pumps - 25 reps - 2 sets - 2x daily - 7x weekly   Supine Ankle Inversion Eversion AROM - 25 reps - 2 sets - 2x daily - 7x weekly   Supine Ankle Circles - 25 reps - 2 sets - 2x daily - 7x weekly   Added resistance with red band today and for HEP  Time 7062-9118     Visit 2 Repeat outcome measure at mid point and end. Pain 2/10     ROM      Modalities            Manual            Stretch                  Exercise      Nustep      Heel slides            Ankle pumps 2 x 25 Supine with LE on bolster    Inversion/eversion 2 x 25 Supine with LE on bolster    Ankle circles CW/CCW 2 x 25 Supine with LE on bolster    Alphabet X 1 set Supine with LE on bolster          DF/PF Red 2 x 25 Supine with LE on bolster    In/Ev  Red x 25 ea Supine with LE on bolster                      TG squats      TG calf raises      Step-ups - FWD      Step-ups - LAT      Step-ups - BWD        NMR To improve balance for safe community and home ambulation    Resisted walk      FWD      BKWD      lat      March      Side stepping      Retro walk      Heel to toe      A:  Tolerated well. Reviewed HEP and added new exercises to written HEP. Instructed to perform resisted ROM with gentle tension once daily in place of second set in evening.   P: Continue with rehab plan  Benito Martin

## 2021-03-22 NOTE — PATIENT INSTRUCTIONS
Get blood work done today   Follow up in 3 months or sooner as needed   Work on cutting back on sweets.

## 2021-03-22 NOTE — PROGRESS NOTES
[address] [facility]  [phone]   Charu Bermudez MD     Patient: Dalia Hastings  YOB: 1950  Visit Date: 3/22/21    Mikala Pina is a 72y.o. year old male here today for   Chief Complaint   Patient presents with    Diabetes       HPI  Patient is a 72year old male with history of dementia, diabetes, and gastric bypass surgery here for diabetes follow up . Takes all medications. 145-160. a1c today 8.1. Takes metformin. Has an increase in diarrhea recently. Otherwise no issues with metformin. Has been more stationary due to surgery and has been snacking more. Had foot surgery 2/18. Bone spur and torn achilles tendon. Doing weekly physical therapy. Has appointment with podiatry this week. Dementia- memory has been good with aricept. Short term memory poor. Not gotten any worse. No side effects from aricept. Eye doctor [facility]     Review of Systems   Constitutional: Negative for chills and fever. Respiratory: Negative for cough, shortness of breath and wheezing. Cardiovascular: Negative for chest pain, palpitations and leg swelling. Gastrointestinal: Negative for abdominal pain, nausea and vomiting. Current Outpatient Medications on File Prior to Visit   Medication Sig Dispense Refill    FLUoxetine (PROZAC) 40 MG capsule TAKE ONE CAPSULE BY MOUTH DAILY 90 capsule 0    amLODIPine (NORVASC) 10 MG tablet TAKE ONE TABLET BY MOUTH DAILY 90 tablet 0    blood glucose test strips (ONETOUCH VERIO) strip USE TO CHECK BLOOD SUGAR DAILY. SWITCH TO WHATEVER BRAND IS COVERED BY INSURANCE 100 each 3    metFORMIN (GLUCOPHAGE-XR) 500 MG extended release tablet TAKE TWO TABLETS BY MOUTH EVERY DAY WITH BREAKFAST 60 tablet 5    atorvastatin (LIPITOR) 20 MG tablet TAKE ONE TABLET BY MOUTH DAILY 90 tablet 5    Lancets 30G MISC 1 each by Does not apply route daily Pt uses Clear Choice Glucose Meter.  100 each 0    glucose monitoring kit (FREESTYLE) monitoring kit 1 kit by Does not apply route daily Check sugars once a day 1 kit 0    donepezil (ARICEPT) 10 MG tablet Take 1 tablet by mouth nightly 90 tablet 3    hydrocortisone 2.5 % ointment APPLY TOPICALLY 2 TIMES DAILY 454 g 0    Blood Pressure Monitor LARY Use daily to check blood pressure 1 Device 0    Glucose Blood (GLUCOSE METER TEST VI) 1 each by In Vitro route daily      Calcium Citrate-Vitamin D (CALCET CREAMY BITES) 500-400 MG-UNIT CHEW tablet Take 1 tablet by mouth daily      vitamin D (CHOLECALCIFEROL) 1000 UNIT TABS tablet Take 1,000 Units by mouth daily      vitamin B-12 (CYANOCOBALAMIN) 50 MCG tablet Take 50 mcg by mouth daily      ferrous gluconate (FERGON) 240 (27 Fe) MG tablet Take 240 mg by mouth 3 times daily (with meals)      aspirin 81 MG EC tablet Take 81 mg by mouth daily Stopped 1 week pre op      lisinopril (PRINIVIL;ZESTRIL) 5 MG tablet Take 0.5 tablets by mouth daily 45 tablet 1     No current facility-administered medications on file prior to visit. No Known Allergies    Past medical, surgical, socialand/or family history reviewed, updated as needed, and are non-contributory (unless otherwise stated). Medications, allergies, and problem list also reviewed and updated as needed in patient's record. Wt Readings from Last 3 Encounters:   03/24/21 255 lb (115.7 kg)   03/22/21 255 lb (115.7 kg)   03/10/21 255 lb (115.7 kg)                   /78   Pulse 80   Temp 97.5 °F (36.4 °C)   Resp 20   Ht 5' 11\" (1.803 m)   Wt 255 lb (115.7 kg)   SpO2 98%   BMI 35.57 kg/m²        Physical Exam  Vitals signs and nursing note reviewed. Constitutional:       General: He is not in acute distress. Appearance: He is well-developed. He is not diaphoretic. HENT:      Head: Normocephalic and atraumatic. Cardiovascular:      Rate and Rhythm: Normal rate and regular rhythm. Heart sounds: Normal heart sounds. No murmur.    Pulmonary:      Effort:

## 2021-03-23 LAB
MAGNESIUM: 2 MG/DL (ref 1.6–2.6)
PHOSPHORUS: 3.2 MG/DL (ref 2.5–4.5)
VITAMIN D 25-HYDROXY: 50 NG/ML (ref 30–100)

## 2021-03-24 ENCOUNTER — OFFICE VISIT (OUTPATIENT)
Dept: PODIATRY | Age: 71
End: 2021-03-24

## 2021-03-24 VITALS — WEIGHT: 255 LBS | HEIGHT: 71 IN | BODY MASS INDEX: 35.7 KG/M2

## 2021-03-24 DIAGNOSIS — M67.88 ACHILLES TENDINOSIS OF RIGHT LOWER EXTREMITY: Primary | ICD-10-CM

## 2021-03-24 DIAGNOSIS — M89.8X7 RETROCALCANEAL EXOSTOSIS: ICD-10-CM

## 2021-03-24 LAB — HEPATITIS C ANTIBODY INTERPRETATION: NORMAL

## 2021-03-24 PROCEDURE — 99024 POSTOP FOLLOW-UP VISIT: CPT | Performed by: PODIATRIST

## 2021-03-24 NOTE — PROGRESS NOTES
Patient is in today for 2 week follow up of right achilles. Patient says the pain has been getting better, but still there. Patient has started PT. pcp is Darryn Garcia MD  Last ov 3/22/21

## 2021-03-25 LAB — ZINC: 58.8 UG/DL (ref 60–120)

## 2021-03-25 NOTE — PROGRESS NOTES
3/25/21     Eliezer Lopez    : 1950   Sex: male    Age: 72 y.o. Patient's PCP/Provider is:  Garth Lanza. MD Jose    Subjective:  Patient is seen today for follow-up regarding continued postoperative management Achilles tendon repair right lower extremity. Patient is undergoing physical therapy at this time and has noticed continual daily improvement. He denies any postoperative issues at this time. No other abnormalities noted. Chief Complaint   Patient presents with    Post-Op Check     right foot/achilles       ROS:  Const: Positives and pertinent negatives as per HPI. Musculo: Denies symptoms other than stated above. Neuro: Denies symptoms other than stated above. Skin: Denies symptoms other than stated above. Current Medications:    Current Outpatient Medications:     FLUoxetine (PROZAC) 40 MG capsule, TAKE ONE CAPSULE BY MOUTH DAILY, Disp: 90 capsule, Rfl: 0    amLODIPine (NORVASC) 10 MG tablet, TAKE ONE TABLET BY MOUTH DAILY, Disp: 90 tablet, Rfl: 0    blood glucose test strips (ONETOUCH VERIO) strip, USE TO CHECK BLOOD SUGAR DAILY.   SWITCH TO WHATEVER BRAND IS COVERED BY INSURANCE, Disp: 100 each, Rfl: 3    metFORMIN (GLUCOPHAGE-XR) 500 MG extended release tablet, TAKE TWO TABLETS BY MOUTH EVERY DAY WITH BREAKFAST, Disp: 60 tablet, Rfl: 5    atorvastatin (LIPITOR) 20 MG tablet, TAKE ONE TABLET BY MOUTH DAILY, Disp: 90 tablet, Rfl: 5    Lancets 30G MISC, 1 each by Does not apply route daily Pt uses Clear Choice Glucose Meter., Disp: 100 each, Rfl: 0    glucose monitoring kit (FREESTYLE) monitoring kit, 1 kit by Does not apply route daily Check sugars once a day, Disp: 1 kit, Rfl: 0    donepezil (ARICEPT) 10 MG tablet, Take 1 tablet by mouth nightly, Disp: 90 tablet, Rfl: 3    hydrocortisone 2.5 % ointment, APPLY TOPICALLY 2 TIMES DAILY, Disp: 454 g, Rfl: 0    Blood Pressure Monitor LARY, Use daily to check blood pressure, Disp: 1 Device, Rfl: 0    Glucose Blood (GLUCOSE METER TEST VI), 1 each by In Vitro route daily, Disp: , Rfl:     Calcium Citrate-Vitamin D (CALCET CREAMY BITES) 500-400 MG-UNIT CHEW tablet, Take 1 tablet by mouth daily, Disp: , Rfl:     vitamin D (CHOLECALCIFEROL) 1000 UNIT TABS tablet, Take 1,000 Units by mouth daily, Disp: , Rfl:     vitamin B-12 (CYANOCOBALAMIN) 50 MCG tablet, Take 50 mcg by mouth daily, Disp: , Rfl:     ferrous gluconate (FERGON) 240 (27 Fe) MG tablet, Take 240 mg by mouth 3 times daily (with meals), Disp: , Rfl:     aspirin 81 MG EC tablet, Take 81 mg by mouth daily Stopped 1 week pre op, Disp: , Rfl:     lisinopril (PRINIVIL;ZESTRIL) 5 MG tablet, Take 0.5 tablets by mouth daily, Disp: 45 tablet, Rfl: 1    Allergies:  No Known Allergies    Vitals:    03/24/21 1328   Weight: 255 lb (115.7 kg)   Height: 5' 11\" (1.803 m)       Exam:  VASCULAR: Pedal pulses palpable right foot. NEUROLOGICAL: Epicritic sensations intact right lower extremity  DERMATOLOGICAL: Incision site is healed without any irritative changes noted posterior right heel. No edema or ecchymotic skin changes present right foot. MUSCULOSKELETAL: Increased range of motion noted right ankle and subtalar joint with minimal discomfort along the distal Achilles insertional area. Diagnostic Studies:     No results found. Procedures:    None    Plan Per Assessment  Nasreen Brennan was seen today for post-op check. Diagnoses and all orders for this visit:    Achilles tendinosis of right lower extremity    Retrocalcaneal exostosis      1. Postoperative evaluation and management  2. We did recommend continued physical therapy sessions for continued postoperative rehabilitation. 3. Patient was advised to increase his activities to tolerance. We are going to start to transition patient into his good supportive shoe gear within the next 7 to 10 days. 4. Patient will be followed up in 2 weeks time or sooner if needed for continued postoperative evaluation and care.   He was advised to call the office with any questions or concerns in the interim. Seen By:    Mary Kay Powell DPM    Electronically signed by Mary Kay Powell DPM on 3/25/2021 at 8:26 AM    This note was created using voice recognition software. The note was reviewed however may contain grammatical errors.

## 2021-03-26 LAB — VITAMIN B1 WHOLE BLOOD: 90 NMOL/L (ref 70–180)

## 2021-03-29 ENCOUNTER — TREATMENT (OUTPATIENT)
Dept: PHYSICAL THERAPY | Age: 71
End: 2021-03-29
Payer: MEDICARE

## 2021-03-29 DIAGNOSIS — M67.88 ACHILLES TENDINOSIS OF RIGHT LOWER EXTREMITY: Primary | ICD-10-CM

## 2021-03-29 DIAGNOSIS — M89.8X7 RETROCALCANEAL EXOSTOSIS: ICD-10-CM

## 2021-03-29 PROCEDURE — 97110 THERAPEUTIC EXERCISES: CPT | Performed by: PHYSICAL THERAPIST

## 2021-04-07 ENCOUNTER — TREATMENT (OUTPATIENT)
Dept: PHYSICAL THERAPY | Age: 71
End: 2021-04-07
Payer: MEDICARE

## 2021-04-07 ENCOUNTER — OFFICE VISIT (OUTPATIENT)
Dept: PODIATRY | Age: 71
End: 2021-04-07

## 2021-04-07 VITALS — WEIGHT: 255 LBS | HEIGHT: 71 IN | BODY MASS INDEX: 35.7 KG/M2

## 2021-04-07 DIAGNOSIS — M89.8X7 RETROCALCANEAL EXOSTOSIS: ICD-10-CM

## 2021-04-07 DIAGNOSIS — M67.88 ACHILLES TENDINOSIS OF RIGHT LOWER EXTREMITY: Primary | ICD-10-CM

## 2021-04-07 PROCEDURE — 99024 POSTOP FOLLOW-UP VISIT: CPT | Performed by: PODIATRIST

## 2021-04-07 PROCEDURE — 97110 THERAPEUTIC EXERCISES: CPT | Performed by: PHYSICAL THERAPIST

## 2021-04-07 NOTE — PROGRESS NOTES
Heel to toe      A:  Tolerated well.     P: Continue with rehab plan  Prudence Thao, PT    Treatment Charges: Mins Units   Initial Evaluation     Re-Evaluation     Ther Exercise         TE 45 3   Manual Therapy     MT     Ther Activities        TA     Gait Training          GT     Neuro Re-education NR     Modalities     Non-Billable Service Time     Other     Total Time/Units 45 3

## 2021-04-07 NOTE — PROGRESS NOTES
21     Marvin Mercado    : 1950   Sex: male    Age: 72 y.o. Patient's PCP/Provider is:  Livia Hashimoto. MD Jose    Subjective:  Patient is seen today for follow-up regarding continued postoperative management Achilles tendon repair right lower extremities. Patient is doing well at this time and is tolerating current physical therapy. Patient has been ambulating with minimal discomfort posterior aspect right lower extremity. Patient has noticed improved strength and gait ability over the last several weeks with therapy. No other additional abnormalities noted at this time. Chief Complaint   Patient presents with    Post-Op Check     2 week right achilles        ROS:  Const: Positives and pertinent negatives as per HPI. Musculo: Denies symptoms other than stated above. Neuro: Denies symptoms other than stated above. Skin: Denies symptoms other than stated above. Current Medications:    Current Outpatient Medications:     FLUoxetine (PROZAC) 40 MG capsule, TAKE ONE CAPSULE BY MOUTH DAILY, Disp: 90 capsule, Rfl: 0    amLODIPine (NORVASC) 10 MG tablet, TAKE ONE TABLET BY MOUTH DAILY, Disp: 90 tablet, Rfl: 0    blood glucose test strips (ONETOUCH VERIO) strip, USE TO CHECK BLOOD SUGAR DAILY.   SWITCH TO WHATEVER BRAND IS COVERED BY INSURANCE, Disp: 100 each, Rfl: 3    metFORMIN (GLUCOPHAGE-XR) 500 MG extended release tablet, TAKE TWO TABLETS BY MOUTH EVERY DAY WITH BREAKFAST, Disp: 60 tablet, Rfl: 5    atorvastatin (LIPITOR) 20 MG tablet, TAKE ONE TABLET BY MOUTH DAILY, Disp: 90 tablet, Rfl: 5    Lancets 30G MISC, 1 each by Does not apply route daily Pt uses Clear Choice Glucose Meter., Disp: 100 each, Rfl: 0    glucose monitoring kit (FREESTYLE) monitoring kit, 1 kit by Does not apply route daily Check sugars once a day, Disp: 1 kit, Rfl: 0    donepezil (ARICEPT) 10 MG tablet, Take 1 tablet by mouth nightly, Disp: 90 tablet, Rfl: 3    hydrocortisone 2.5 % ointment, APPLY TOPICALLY 2 TIMES DAILY, Disp: 454 g, Rfl: 0    Blood Pressure Monitor LARY, Use daily to check blood pressure, Disp: 1 Device, Rfl: 0    Glucose Blood (GLUCOSE METER TEST VI), 1 each by In Vitro route daily, Disp: , Rfl:     Calcium Citrate-Vitamin D (CALCET CREAMY BITES) 500-400 MG-UNIT CHEW tablet, Take 1 tablet by mouth daily, Disp: , Rfl:     vitamin D (CHOLECALCIFEROL) 1000 UNIT TABS tablet, Take 1,000 Units by mouth daily, Disp: , Rfl:     vitamin B-12 (CYANOCOBALAMIN) 50 MCG tablet, Take 50 mcg by mouth daily, Disp: , Rfl:     ferrous gluconate (FERGON) 240 (27 Fe) MG tablet, Take 240 mg by mouth 3 times daily (with meals), Disp: , Rfl:     aspirin 81 MG EC tablet, Take 81 mg by mouth daily Stopped 1 week pre op, Disp: , Rfl:     lisinopril (PRINIVIL;ZESTRIL) 5 MG tablet, Take 0.5 tablets by mouth daily, Disp: 45 tablet, Rfl: 1    Allergies:  No Known Allergies    Vitals:    04/07/21 1349   Weight: 255 lb (115.7 kg)   Height: 5' 11\" (1.803 m)       Exam:  Neurovascular status unchanged. Incision site well coapted without signs of dehiscence or infection noted right posterior heel. No edema or ecchymotic skin changes present surgical site right foot. Adequate strength noted upon evaluation today right lower extremity. Improved gait noted upon evaluation. Diagnostic Studies:     No results found. Procedures:    None    Plan Per Assessment  Yoandy Briscoe was seen today for post-op check. Diagnoses and all orders for this visit:    Achilles tendinosis of right lower extremity      1. Postoperative evaluation and management  2. We did discuss transitioning into his good supportive athletic shoe gear at this time. Patient is to increase activities to tolerance, and continue with his scheduled physical therapy sessions. 3. We did discuss resuming driving activities short distances. We did recommend holding off gym participation in until next week.   4. Patient will be followed up in 2 weeks time or sooner

## 2021-04-07 NOTE — PROGRESS NOTES
Patient is in today for 2 week post op of right achilles. Patient says the pain has lessened. Patient is still attending PT as scheduled. pcp is Darryn Garcia MD  Last ov 3/22/21

## 2021-04-08 ENCOUNTER — TELEPHONE (OUTPATIENT)
Dept: FAMILY MEDICINE CLINIC | Age: 71
End: 2021-04-08

## 2021-04-08 NOTE — TELEPHONE ENCOUNTER
----- Message from Phyllis Markham MD sent at 4/8/2021  9:18 AM EDT -----  Patient's zinc was low. Should take zinc supplement.

## 2021-04-12 ENCOUNTER — TREATMENT (OUTPATIENT)
Dept: PHYSICAL THERAPY | Age: 71
End: 2021-04-12
Payer: MEDICARE

## 2021-04-12 DIAGNOSIS — M89.8X7 RETROCALCANEAL EXOSTOSIS: ICD-10-CM

## 2021-04-12 DIAGNOSIS — M67.88 ACHILLES TENDINOSIS OF RIGHT LOWER EXTREMITY: Primary | ICD-10-CM

## 2021-04-12 PROCEDURE — 97110 THERAPEUTIC EXERCISES: CPT | Performed by: PHYSICAL THERAPIST

## 2021-04-12 NOTE — PROGRESS NOTES
Physical Therapy Daily Treatment Note    Date: 2021  Patient Name: Krystal Moses  : 1950   MRN: [id number]  DOInjury: 2019 insidious onset   DOSx: 2021  Referring Provider: No referring provider defined for this encounter. Medical Diagnosis:   M67.88 (ICD-10-CM) - Achilles tendinosis of right lower extremity   M89.8X7 (ICD-10-CM) - Retrocalcaneal exostosis         Outcome Measure:  Lower Extremity Functional Scale (LEFS) 54% impairment    S: Pt reports he is getting better slowly, states he has 5/10 pain on plantar surface of heel on weightbearing, states he mowed grass on riding mower, reports he did get gel inserts and his heel feels better  O: ambulating w/ cane, no boot,   Access Code: [email or web address]   URL: [email or web address]   Date: 03/15/2021   Prepared by: Cortney Mckinnon HEP of marching, gentle double calf raises, alt hip abd  Time      Visit 5 Repeat outcome measure at mid point and end. Pain 5/10, plantar surface heel on heel strike     ROM      Modalities            Manual            Stretch                  Exercise      Nustep 10 min  #5     Heel raises Level surface 3 x 15 1-2\" raises slow     marching In ll bars 2 x 20     Ankle pumps Supine with LE on bolster    Inversion/eversion Supine with LE on bolster    Ankle circles CW/CCW Supine with LE on bolster    Alphabet Supine with LE on bolster         DF/PF Supine with LE on bolster    In/Ev  Supine with LE on bolster    Seated plantarflexion     Seated dorsiflexion 3 x 20           TG squats      TG calf raises      Step-ups - FWD      Step-ups - LAT      Step-ups - BWD        NMR To improve balance for safe community and home ambulation    Resisted walk      FWD      BKWD      lat      March      Side stepping      Retro walk      Heel to toe      A:  Tolerated well.     P: Continue with rehab plan  Allison Ricketts PT    Treatment Charges: Mins Units   Initial Evaluation     Re-Evaluation     Ther Exercise         TE 40 3   Manual Therapy     MT     Ther Activities        TA     Gait Training          GT     Neuro Re-education NR     Modalities     Non-Billable Service Time     Other     Total Time/Units 40 3

## 2021-04-20 ENCOUNTER — TREATMENT (OUTPATIENT)
Dept: PHYSICAL THERAPY | Age: 71
End: 2021-04-20
Payer: MEDICARE

## 2021-04-20 DIAGNOSIS — M89.8X7 RETROCALCANEAL EXOSTOSIS: ICD-10-CM

## 2021-04-20 DIAGNOSIS — M67.88 ACHILLES TENDINOSIS OF RIGHT LOWER EXTREMITY: Primary | ICD-10-CM

## 2021-04-20 PROCEDURE — 97110 THERAPEUTIC EXERCISES: CPT | Performed by: PHYSICAL THERAPIST

## 2021-04-20 NOTE — PROGRESS NOTES
Physical Therapy Daily Treatment Note    Date: 2021  Patient Name: Dalia Hastings  : 1950   MRN: [id number]  DOInjury: 2019 insidious onset   DOSx: 2021  Referring Provider: No referring provider defined for this encounter. Medical Diagnosis:   M67.88 (ICD-10-CM) - Achilles tendinosis of right lower extremity   M89.8X7 (ICD-10-CM) - Retrocalcaneal exostosis         Outcome Measure:  Lower Extremity Functional Scale (LEFS) 54% impairment    S: states he still has 4-5/10 pain on plantar surface of heel on weightbearing   O: ambulating w/ cane only when heel is painful, enters clinic today w/o cane  Access Code: [email or web address]   URL: [email or web address]   Date: 03/15/2021   Prepared by: Neo Flores   Changed calf raises to sitting due to pain    Time 0900-09     Visit 6 Repeat outcome measure at mid point and end. Pain 5/10, plantar surface heel on heel strike     ROM      Modalities            Manual            Stretch                  Exercise      Nustep 10 min  #5     Heel raise in sitting      Heel raises SITTING Level surface 3 x 15 1-2\" raises slow     marching In ll bars 2 x 20     Ankle pumps Supine with LE on bolster    Inversion/eversion Supine with LE on bolster    Ankle circles CW/CCW Supine with LE on bolster    Alphabet Supine with LE on bolster         DF/PF Supine with LE on bolster    In/Ev  Supine with LE on bolster    Seated plantarflexion     Seated dorsiflexion 3 x 20           TG squats      TG calf raises      Step-ups - FWD      Step-ups - LAT      Step-ups - BWD        NMR To improve balance for safe community and home ambulation    Resisted walk      FWD      BKWD      lat      March in place  20 reps x 2     Alt hip ABD standing 20 reps x 2     Retro walk      Heel to toe      A:  Tolerated well.     P: Continue with rehab plan  Neo Flores PT    Treatment Charges: Mins Units   Initial Evaluation     Re-Evaluation     Ther Exercise         TE 35 2   Manual Therapy     MT Ther Activities        TA     Gait Training          GT     Neuro Re-education NR     Modalities     Non-Billable Service Time     Other     Total Time/Units 35 2

## 2021-04-21 ENCOUNTER — OFFICE VISIT (OUTPATIENT)
Dept: PODIATRY | Age: 71
End: 2021-04-21

## 2021-04-21 VITALS — BODY MASS INDEX: 35.7 KG/M2 | WEIGHT: 255 LBS | HEIGHT: 71 IN

## 2021-04-21 DIAGNOSIS — M67.88 ACHILLES TENDINOSIS OF RIGHT LOWER EXTREMITY: Primary | ICD-10-CM

## 2021-04-21 PROCEDURE — 99024 POSTOP FOLLOW-UP VISIT: CPT | Performed by: PODIATRIST

## 2021-04-21 NOTE — PROGRESS NOTES
Disp: 454 g, Rfl: 0    Blood Pressure Monitor LARY, Use daily to check blood pressure, Disp: 1 Device, Rfl: 0    Glucose Blood (GLUCOSE METER TEST VI), 1 each by In Vitro route daily, Disp: , Rfl:     Calcium Citrate-Vitamin D (CALCET CREAMY BITES) 500-400 MG-UNIT CHEW tablet, Take 1 tablet by mouth daily, Disp: , Rfl:     vitamin D (CHOLECALCIFEROL) 1000 UNIT TABS tablet, Take 1,000 Units by mouth daily, Disp: , Rfl:     vitamin B-12 (CYANOCOBALAMIN) 50 MCG tablet, Take 50 mcg by mouth daily, Disp: , Rfl:     ferrous gluconate (FERGON) 240 (27 Fe) MG tablet, Take 240 mg by mouth 3 times daily (with meals), Disp: , Rfl:     aspirin 81 MG EC tablet, Take 81 mg by mouth daily Stopped 1 week pre op, Disp: , Rfl:     lisinopril (PRINIVIL;ZESTRIL) 5 MG tablet, Take 0.5 tablets by mouth daily, Disp: 45 tablet, Rfl: 1    Allergies:  No Known Allergies    Vitals:    04/21/21 1439   Weight: 255 lb (115.7 kg)   Height: 5' 11\" (1.803 m)       Exam:  Vascular status unchanged. Minimal tenderness noted to palpation to the posterior right heel region. Range of motion right ankle and subtalar joint noted. Minimal antalgic gait noted upon evaluation today. Diagnostic Studies:     No results found. Procedures:    None    Plan Per Assessment  Kayleigh Drew was seen today for post-op check. Diagnoses and all orders for this visit:    Achilles tendinosis of right lower extremity      1. Postoperative evaluation and management  2. We did advised the patient continued use of his good supportive shoe gear and insoles with all ambulatory activities. 3. Patient was advised to continue with his scheduled physical therapy for continued postoperative rehabilitation. 4. Patient will be followed up in 1 month's time or sooner if needed for continued evaluation and management. He was advised to call the office with any questions or concerns in the interim.       Seen By:    Vanda Coppola DPM    Electronically signed by Hector Blancas DPM on 4/21/2021 at 2:46 PM    This note was created using voice recognition software. The note was reviewed however may contain grammatical errors.

## 2021-04-21 NOTE — PROGRESS NOTES
Patient is in today for 2 week post op of right achilles. Patient is still having slight pain on and off. pcp is Darryn Garcia MD  Last ov 3/22/21

## 2021-04-26 ENCOUNTER — TREATMENT (OUTPATIENT)
Dept: PHYSICAL THERAPY | Age: 71
End: 2021-04-26
Payer: MEDICARE

## 2021-04-26 DIAGNOSIS — M89.8X7 RETROCALCANEAL EXOSTOSIS: ICD-10-CM

## 2021-04-26 DIAGNOSIS — M67.88 ACHILLES TENDINOSIS OF RIGHT LOWER EXTREMITY: Primary | ICD-10-CM

## 2021-04-26 PROCEDURE — 97110 THERAPEUTIC EXERCISES: CPT | Performed by: PHYSICAL THERAPIST

## 2021-04-26 NOTE — PROGRESS NOTES
Physical Therapy Daily Treatment Note    Date: 2021  Patient Name: Sharon Truong  : 1950   MRN: [id number]  DOInjury: 2019 insidious onset   DOSx: 2021  Referring Provider: No referring provider defined for this encounter. Medical Diagnosis:   M67.88 (ICD-10-CM) - Achilles tendinosis of right lower extremity   M89.8X7 (ICD-10-CM) - Retrocalcaneal exostosis         Outcome Measure:  Lower Extremity Functional Scale (LEFS) 54% impairment    S: reports pain decreased significantly w/ walking  O: ambulating w/o cane for past 4 days, good gait pattern noted  Access Code: [email or web address]   URL: [email or web address]   Date: 03/15/2021   Prepared by: Rhea Velazco   No pain noted w/ standing calf raises today    Time 2603-7358     Visit 7 Repeat outcome measure at mid point and end. Pain 2/10, plantar surface heel on heel strike     ROM      Modalities            Manual            Stretch                  Exercise      Nustep 10 min  #5     Heel raise in sitting      Heel raises Level surface 3 x 10   2\" raises slow     marching In ll bars 2 x 20     Ankle pumps Supine with LE on bolster    Inversion/eversion Supine with LE on bolster    Ankle circles CW/CCW Supine with LE on bolster    Alphabet Supine with LE on bolster         DF/PF Supine with LE on bolster    In/Ev  Supine with LE on bolster    Seated plantarflexion     Seated dorsiflexion 3 x 20           TG squats      TG calf raises      Step-ups - FWD      Step-ups - LAT      Step-ups - BWD        NMR To improve balance for safe community and home ambulation    Resisted walk      FWD      BKWD      lat      March in place  20 reps x 2     Alt hip ABD standing 20 reps x 2     squat      Heel to toe      A:  Tolerated well.     P: Continue with rehab plan  Rhea Velazco PT    Treatment Charges: Mins Units   Initial Evaluation     Re-Evaluation     Ther Exercise         TE 40 3   Manual Therapy     MT     Ther Activities        TA     Gait Training GT     Neuro Re-education NR     Modalities     Non-Billable Service Time     Other     Total Time/Units 40 3

## 2021-05-03 ENCOUNTER — TREATMENT (OUTPATIENT)
Dept: PHYSICAL THERAPY | Age: 71
End: 2021-05-03
Payer: MEDICARE

## 2021-05-03 DIAGNOSIS — M67.88 ACHILLES TENDINOSIS OF RIGHT LOWER EXTREMITY: Primary | ICD-10-CM

## 2021-05-03 PROCEDURE — 97110 THERAPEUTIC EXERCISES: CPT

## 2021-05-03 NOTE — PROGRESS NOTES
Physical Therapy Daily Treatment Note    Date: 5/3/2021  Patient Name: Ezra Miller  : 1950   MRN: [id number]  [facility]: 2019 insidious onset   DOSx: 2021  Referring Provider: Nasrin Tamayo, DPM  [address] [facility]       Medical Diagnosis:   L22.50 (ICD-10-CM) - Achilles tendinosis of right lower extremity   M89.8X7 (ICD-10-CM) - Retrocalcaneal exostosis         Outcome Measure:  Lower Extremity Functional Scale (LEFS) 54% impairment    S: reports pain decreased significantly w/ walking  O: ambulating w/o cane for past 4 days, good gait pattern noted  Access Code: [email or web address]   URL: [email or web address]   Date: 03/15/2021   Prepared by: Mustapha Mcnamara   No pain noted w/ standing calf raises today    Time 8132-8543     Visit 8 / Repeat outcome measure at mid point and end. Pain 2/10, plantar surface heel on heel strike     ROM      Modalities            Manual            Stretch                  Exercise      Nustep 10 min  #5     Heel raise in sitting      Heel raises Level surface 3 x 10   2\" raises slow     marching In ll bars 2 x 20     Ankle pumps Supine with LE on bolster    Inversion/eversion Supine with LE on bolster    Ankle circles CW/CCW Supine with LE on bolster    Alphabet Supine with LE on bolster         DF/PF Supine with LE on bolster    In/Ev  Supine with LE on bolster    Seated plantarflexion     Seated dorsiflexion 3 x 20           TG squats L 16 3 x 15 new      TG calf raises      Step-ups - FWD 4\" x 30 new      Step-ups - LAT 4\" x 30 new      Step-ups - BWD         To improve balance for safe community and home ambulation    Resisted walk      FWD      BKWD      lat      March in place  20 reps x 2     Alt hip ABD standing 20 reps x 2     squat      Heel to toe      A:  Tolerated well. Pt able to tolerate progression of newly added step up ex's / total gym.    P: Continue with rehab plan  Brian Hoffmann PTA    Treatment Charges: Mins Units   Initial Evaluation     Re-Evaluation     Ther Exercise         TE 45 3   Manual Therapy     MT     Ther Activities        TA     Gait Training          GT     Neuro Re-education NR     Modalities     Non-Billable Service Time     Other     Total Time/Units 45 3

## 2021-05-10 ENCOUNTER — TREATMENT (OUTPATIENT)
Dept: PHYSICAL THERAPY | Age: 71
End: 2021-05-10
Payer: MEDICARE

## 2021-05-10 DIAGNOSIS — M67.88 ACHILLES TENDINOSIS OF RIGHT LOWER EXTREMITY: Primary | ICD-10-CM

## 2021-05-10 PROCEDURE — 97110 THERAPEUTIC EXERCISES: CPT

## 2021-05-19 DIAGNOSIS — F03.90 DEMENTIA WITHOUT BEHAVIORAL DISTURBANCE, UNSPECIFIED DEMENTIA TYPE: ICD-10-CM

## 2021-05-20 ENCOUNTER — TREATMENT (OUTPATIENT)
Dept: PHYSICAL THERAPY | Age: 71
End: 2021-05-20
Payer: MEDICARE

## 2021-05-20 ENCOUNTER — OFFICE VISIT (OUTPATIENT)
Dept: PODIATRY | Age: 71
End: 2021-05-20
Payer: MEDICARE

## 2021-05-20 VITALS — WEIGHT: 255 LBS | HEIGHT: 71 IN | BODY MASS INDEX: 35.7 KG/M2

## 2021-05-20 DIAGNOSIS — M67.88 ACHILLES TENDINOSIS OF RIGHT LOWER EXTREMITY: Primary | ICD-10-CM

## 2021-05-20 PROCEDURE — 3017F COLORECTAL CA SCREEN DOC REV: CPT | Performed by: PODIATRIST

## 2021-05-20 PROCEDURE — 99213 OFFICE O/P EST LOW 20 MIN: CPT | Performed by: PODIATRIST

## 2021-05-20 PROCEDURE — 1036F TOBACCO NON-USER: CPT | Performed by: PODIATRIST

## 2021-05-20 PROCEDURE — G8427 DOCREV CUR MEDS BY ELIG CLIN: HCPCS | Performed by: PODIATRIST

## 2021-05-20 PROCEDURE — 4040F PNEUMOC VAC/ADMIN/RCVD: CPT | Performed by: PODIATRIST

## 2021-05-20 PROCEDURE — 97110 THERAPEUTIC EXERCISES: CPT

## 2021-05-20 PROCEDURE — 1123F ACP DISCUSS/DSCN MKR DOCD: CPT | Performed by: PODIATRIST

## 2021-05-20 PROCEDURE — G8417 CALC BMI ABV UP PARAM F/U: HCPCS | Performed by: PODIATRIST

## 2021-05-20 RX ORDER — DONEPEZIL HYDROCHLORIDE 10 MG/1
10 TABLET, FILM COATED ORAL NIGHTLY
Qty: 90 TABLET | Refills: 3 | Status: SHIPPED
Start: 2021-05-20 | End: 2022-04-06 | Stop reason: SDUPTHER

## 2021-05-20 NOTE — PROGRESS NOTES
hydrocortisone 2.5 % ointment, APPLY TOPICALLY 2 TIMES DAILY, Disp: 454 g, Rfl: 0    Blood Pressure Monitor LARY, Use daily to check blood pressure, Disp: 1 Device, Rfl: 0    Glucose Blood (GLUCOSE METER TEST VI), 1 each by In Vitro route daily, Disp: , Rfl:     Calcium Citrate-Vitamin D (CALCET CREAMY BITES) 500-400 MG-UNIT CHEW tablet, Take 1 tablet by mouth daily, Disp: , Rfl:     vitamin D (CHOLECALCIFEROL) 1000 UNIT TABS tablet, Take 1,000 Units by mouth daily, Disp: , Rfl:     vitamin B-12 (CYANOCOBALAMIN) 50 MCG tablet, Take 50 mcg by mouth daily, Disp: , Rfl:     ferrous gluconate (FERGON) 240 (27 Fe) MG tablet, Take 240 mg by mouth 3 times daily (with meals), Disp: , Rfl:     aspirin 81 MG EC tablet, Take 81 mg by mouth daily Stopped 1 week pre op, Disp: , Rfl:     lisinopril (PRINIVIL;ZESTRIL) 5 MG tablet, Take 0.5 tablets by mouth daily, Disp: 45 tablet, Rfl: 1    Allergies:  No Known Allergies    Vitals:    05/20/21 1334   Weight: 255 lb (115.7 kg)   Height: 5' 11\" (1.803 m)       Exam:  Vascular status grossly intact right lower extremity. Previous surgical site well coapted without issues noted. No edema or ecchymotic skin changes present into the posterior right heel and arch region. Increased range of motion noted right ankle and subtalar joint. Improved gait noted upon evaluation. Intact Achilles tendon repair site distal insertional area right. Diagnostic Studies:     No results found. Procedures:    None    Plan Per Assessment  Hilton Fisher was seen today for follow-up. Diagnoses and all orders for this visit:    Achilles tendinosis of right lower extremity      1. Evaluation and management  2. We did discuss continued treatment options with patient in detail today. We did recommend purchasing a compression sock for the right lower extremity when he does perform his outdoor activities to reduce residual edema with increased activities.   3. Patient is to finish up his final

## 2021-05-20 NOTE — PROGRESS NOTES
Physical Therapy Daily Treatment Note    Date: 2021  Patient Name: Dustin Yepez  : 1950   MRN: [id number]  [facility]: 2019 insidious onset   DOSx: 2021  Referring Provider: Adis Loya., DPM  [facility]       Medical Diagnosis:   T82.56 (ICD-10-CM) - Achilles tendinosis of right lower extremity   M89.8X7 (ICD-10-CM) - Retrocalcaneal exostosis         Outcome Measure:  Lower Extremity Functional Scale (LEFS) 54% impairment    End score 2021= unable to score    S: reports pain decreased significantly w/ walking  O: ambulating w/o cane for past 4 days, good gait pattern noted  Access Code: [email or web address]   URL: [email or web address]   Date: 03/15/2021   Prepared by: Ellen Saavedra   S: pt reports RTD and stated he told his  Dr. He has been attending PT also going the gym doing the same ex's . Physician gave ok for pt to just continue going to the gym and discontinue PT since he is now much better than the start of PT sessions. Time [id number]     Visit 10-  / Repeat outcome measure at mid point and end.     Pain 0/10, plantar surface heel on heel strike     ROM      Modalities            Manual            Stretch                  Exercise            Recumbent bike  X 10 min   te               Heel raise in sitting      Heel raises Level surface 3 x 10   2\" raises slow  te   marching In ll bars 2 x 20  te   Ankle pumps Supine with LE on bolster te   Inversion/eversion Supine with LE on bolster te   Ankle circles CW/CCW Supine with LE on bolster te   Alphabet Supine with LE on bolster te        DF/PF Supine with LE on bolster    In/Ev  Supine with LE on bolster    Seated plantarflexion     Seated dorsiflexion 3 x 20           TG squats L 16 3 x 15   te   TG calf raises      Step-ups - FWD 4\" x 30    te   Step-ups - LAT 4\" x 30   te   Step-ups - BWD            Marching  X 20  te   Hip ABD/ADD X 20  te   Calf raises  X 20  te      To improve balance for safe community and home ambulation    Resisted walk      FWD      BKWD      lat      March in place      Alt hip ABD standing     squat      Heel to toe      A:  Tolerated well. Pt able to progress with no c/o along with reaching Pt / self goals . P:  .  Last rx session per pt's request due to feeling normal now . Wanting to continue independently at a local gym.    Geovany Jobs, PTA    Treatment Charges: Mins Units   Initial Evaluation     Re-Evaluation     Ther Exercise         TE 45 3   Manual Therapy     MT     Ther Activities        TA     Gait Training          GT     Neuro Re-education NR     Modalities     Non-Billable Service Time     Other     Total Time/Units 45 3

## 2021-05-25 DIAGNOSIS — F33.1 MODERATE EPISODE OF RECURRENT MAJOR DEPRESSIVE DISORDER (HCC): ICD-10-CM

## 2021-05-25 DIAGNOSIS — I10 ESSENTIAL HYPERTENSION: ICD-10-CM

## 2021-05-25 RX ORDER — AMLODIPINE BESYLATE 10 MG/1
TABLET ORAL
Qty: 90 TABLET | Refills: 2 | Status: SHIPPED
Start: 2021-05-25 | End: 2022-02-15 | Stop reason: SDUPTHER

## 2021-05-25 RX ORDER — FLUOXETINE HYDROCHLORIDE 40 MG/1
CAPSULE ORAL
Qty: 90 CAPSULE | Refills: 2 | Status: SHIPPED
Start: 2021-05-25 | End: 2022-02-15 | Stop reason: SDUPTHER

## 2021-06-11 DIAGNOSIS — E11.9 TYPE 2 DIABETES MELLITUS WITHOUT COMPLICATION, WITHOUT LONG-TERM CURRENT USE OF INSULIN (HCC): ICD-10-CM

## 2021-06-14 RX ORDER — METFORMIN HYDROCHLORIDE 500 MG/1
TABLET, EXTENDED RELEASE ORAL
Qty: 60 TABLET | Refills: 5 | Status: SHIPPED
Start: 2021-06-14 | End: 2022-02-15 | Stop reason: SDUPTHER

## 2021-06-14 RX ORDER — LISINOPRIL 5 MG/1
2.5 TABLET ORAL DAILY
Qty: 45 TABLET | Refills: 1 | Status: SHIPPED
Start: 2021-06-14 | End: 2021-12-13 | Stop reason: SDUPTHER

## 2021-06-28 ENCOUNTER — OFFICE VISIT (OUTPATIENT)
Dept: FAMILY MEDICINE CLINIC | Age: 71
End: 2021-06-28
Payer: MEDICARE

## 2021-06-28 VITALS
TEMPERATURE: 97.3 F | HEART RATE: 79 BPM | BODY MASS INDEX: 35.98 KG/M2 | WEIGHT: 257 LBS | HEIGHT: 71 IN | RESPIRATION RATE: 20 BRPM | DIASTOLIC BLOOD PRESSURE: 76 MMHG | SYSTOLIC BLOOD PRESSURE: 120 MMHG | OXYGEN SATURATION: 95 %

## 2021-06-28 DIAGNOSIS — F33.1 MODERATE EPISODE OF RECURRENT MAJOR DEPRESSIVE DISORDER (HCC): ICD-10-CM

## 2021-06-28 DIAGNOSIS — E66.01 SEVERE OBESITY (BMI 35.0-35.9 WITH COMORBIDITY) (HCC): ICD-10-CM

## 2021-06-28 DIAGNOSIS — E11.9 TYPE 2 DIABETES MELLITUS WITHOUT COMPLICATION, WITHOUT LONG-TERM CURRENT USE OF INSULIN (HCC): Primary | ICD-10-CM

## 2021-06-28 DIAGNOSIS — R06.09 DYSPNEA ON EXERTION: ICD-10-CM

## 2021-06-28 DIAGNOSIS — I73.9 SMALL VESSEL DISEASE (HCC): ICD-10-CM

## 2021-06-28 DIAGNOSIS — R42 LIGHTHEADEDNESS: ICD-10-CM

## 2021-06-28 DIAGNOSIS — G47.33 OSA (OBSTRUCTIVE SLEEP APNEA): ICD-10-CM

## 2021-06-28 LAB — HBA1C MFR BLD: 7.9 %

## 2021-06-28 PROCEDURE — G8417 CALC BMI ABV UP PARAM F/U: HCPCS | Performed by: FAMILY MEDICINE

## 2021-06-28 PROCEDURE — 2022F DILAT RTA XM EVC RTNOPTHY: CPT | Performed by: FAMILY MEDICINE

## 2021-06-28 PROCEDURE — 99214 OFFICE O/P EST MOD 30 MIN: CPT | Performed by: FAMILY MEDICINE

## 2021-06-28 PROCEDURE — 4040F PNEUMOC VAC/ADMIN/RCVD: CPT | Performed by: FAMILY MEDICINE

## 2021-06-28 PROCEDURE — 1123F ACP DISCUSS/DSCN MKR DOCD: CPT | Performed by: FAMILY MEDICINE

## 2021-06-28 PROCEDURE — 3017F COLORECTAL CA SCREEN DOC REV: CPT | Performed by: FAMILY MEDICINE

## 2021-06-28 PROCEDURE — G8427 DOCREV CUR MEDS BY ELIG CLIN: HCPCS | Performed by: FAMILY MEDICINE

## 2021-06-28 PROCEDURE — 3051F HG A1C>EQUAL 7.0%<8.0%: CPT | Performed by: FAMILY MEDICINE

## 2021-06-28 PROCEDURE — 1036F TOBACCO NON-USER: CPT | Performed by: FAMILY MEDICINE

## 2021-06-28 PROCEDURE — 83036 HEMOGLOBIN GLYCOSYLATED A1C: CPT | Performed by: FAMILY MEDICINE

## 2021-06-28 SDOH — ECONOMIC STABILITY: FOOD INSECURITY: WITHIN THE PAST 12 MONTHS, THE FOOD YOU BOUGHT JUST DIDN'T LAST AND YOU DIDN'T HAVE MONEY TO GET MORE.: NEVER TRUE

## 2021-06-28 SDOH — ECONOMIC STABILITY: FOOD INSECURITY: WITHIN THE PAST 12 MONTHS, YOU WORRIED THAT YOUR FOOD WOULD RUN OUT BEFORE YOU GOT MONEY TO BUY MORE.: NEVER TRUE

## 2021-06-28 ASSESSMENT — SOCIAL DETERMINANTS OF HEALTH (SDOH): HOW HARD IS IT FOR YOU TO PAY FOR THE VERY BASICS LIKE FOOD, HOUSING, MEDICAL CARE, AND HEATING?: NOT HARD AT ALL

## 2021-06-28 NOTE — PROGRESS NOTES
[address] [id number]   Carly Sams MD     Patient: Karen Lang  YOB: 1950  Visit Date: 6/28/21    Mary Garcia is a 72y.o. year old male here today for   Chief Complaint   Patient presents with    Diabetes       HPI  Patient is a 72year old male here for diabetes and dementia . '    Seeing Dr Inessa Stewart . Is doing physical therapy for right foot pain. Is still having pain so will be following up with Dr. Inessa Stewart soon. Diabetes - mildly elevated. Has been having some lower back pain - is going to chiropractor. Has been getting dizzy . If he moves around too quick feels lightheadedness and that he will fall over. Worsening recently. Gets around ok, no problems with walking. No falls. No chest pain . Does get shortness of breath especially when going up stairs. Has been worsening over the couple months. Was waking up with headaches and has started using CPAP . Daytime headaches persist. Would like to go back to see Dr. Daniel Keane    Diabetes- a1c 7.9 . Has improved. Takes metformin. Sugars are 139-179. Dementia - takes aricept. Lightheadedness is a little worse since starting the aricept. Takes it at night. Depression - mostly good. Irritable but overall good. Takes prozac. Obesity - has gained a couple pounds . Is trying to increase protein and working out 3x/week. Review of Systems   Constitutional: Negative for chills and fever. Respiratory: Negative for cough, shortness of breath and wheezing. Cardiovascular: Negative for chest pain, palpitations and leg swelling. Gastrointestinal: Negative for abdominal pain, nausea and vomiting. Neurological: Positive for dizziness and headaches.        Current Outpatient Medications on File Prior to Visit   Medication Sig Dispense Refill    metFORMIN (GLUCOPHAGE-XR) 500 MG extended release tablet TAKE TWO TABLETS BY MOUTH EVERY DAY WITH BREAKFAST 60 tablet 5    lisinopril (PRINIVIL;ZESTRIL) 5 MG tablet Take 0.5 tablets by mouth daily 45 tablet 1    FLUoxetine (PROZAC) 40 MG capsule TAKE ONE CAPSULE BY MOUTH DAILY 90 capsule 2    amLODIPine (NORVASC) 10 MG tablet TAKE ONE TABLET BY MOUTH DAILY 90 tablet 2    donepezil (ARICEPT) 10 MG tablet Take 1 tablet by mouth nightly 90 tablet 3    blood glucose test strips (ONETOUCH VERIO) strip USE TO CHECK BLOOD SUGAR DAILY. SWITCH TO WHATEVER BRAND IS COVERED BY INSURANCE 100 each 3    atorvastatin (LIPITOR) 20 MG tablet TAKE ONE TABLET BY MOUTH DAILY 90 tablet 5    Lancets 30G MISC 1 each by Does not apply route daily Pt uses Clear Choice Glucose Meter. 100 each 0    glucose monitoring kit (FREESTYLE) monitoring kit 1 kit by Does not apply route daily Check sugars once a day 1 kit 0    hydrocortisone 2.5 % ointment APPLY TOPICALLY 2 TIMES DAILY 454 g 0    Blood Pressure Monitor LARY Use daily to check blood pressure 1 Device 0    Glucose Blood (GLUCOSE METER TEST VI) 1 each by In Vitro route daily      Calcium Citrate-Vitamin D (CALCET CREAMY BITES) 500-400 MG-UNIT CHEW tablet Take 1 tablet by mouth daily      vitamin D (CHOLECALCIFEROL) 1000 UNIT TABS tablet Take 1,000 Units by mouth daily      vitamin B-12 (CYANOCOBALAMIN) 50 MCG tablet Take 50 mcg by mouth daily      ferrous gluconate (FERGON) 240 (27 Fe) MG tablet Take 240 mg by mouth 3 times daily (with meals)      aspirin 81 MG EC tablet Take 81 mg by mouth daily Stopped 1 week pre op       No current facility-administered medications on file prior to visit. No Known Allergies    Past medical, surgical, socialand/or family history reviewed, updated as needed, and are non-contributory (unless otherwise stated). Medications, allergies, and problem list also reviewed and updated as needed in patient's record.     Wt Readings from Last 3 Encounters:   06/28/21 257 lb (116.6 kg)   05/20/21 255 lb (115.7 kg)   04/21/21 255 lb (115.7 kg) /76   Pulse 79   Temp 97.3 °F (36.3 °C)   Resp 20   Ht 5' 11\" (1.803 m)   Wt 257 lb (116.6 kg)   SpO2 95%   BMI 35.84 kg/m²        Physical Exam  Vitals and nursing note reviewed. Constitutional:       General: He is not in acute distress. Appearance: He is well-developed. He is not diaphoretic. HENT:      Head: Normocephalic and atraumatic. Cardiovascular:      Rate and Rhythm: Normal rate and regular rhythm. Heart sounds: Normal heart sounds. No murmur heard. Pulmonary:      Effort: Pulmonary effort is normal. No respiratory distress. Breath sounds: Normal breath sounds. No wheezing or rales. Abdominal:      General: Bowel sounds are normal. There is no distension. Tenderness: There is no abdominal tenderness. There is no guarding or rebound. Musculoskeletal:         General: Swelling (trace) present. Normal range of motion. Results for orders placed or performed in visit on 06/28/21   POCT glycosylated hemoglobin (Hb A1C)   Result Value Ref Range    Hemoglobin A1C 7.9 %       ASSESSMENT/PLAN  Iris Hui was seen today for diabetes. Diagnoses and all orders for this visit:    Type 2 diabetes mellitus without complication, without long-term current use of insulin (HCC)  -     POCT glycosylated hemoglobin (Hb A1C)  - Continue current dose of meds     Lightheadedness  -     ECHO 2D WO Color Doppler Complete; Future    Moderate episode of recurrent major depressive disorder (HCC)   - Controlled on prozac     Small vessel disease ([address]   - Stable. Control bp     GILDARDO (obstructive sleep apnea)  -     Odette Corbett MD, Sleep Medicine, [place]  Patient using CPAP however has not used in years and setting may not be correct. Should have updated sleep study and see sleep doctor. Severe obesity (BMI 35.0-35.9 with comorbidity) ([address]   - Stable.  Working on diet    Dyspnea on exertion  -     ECHO 2D WO Color Doppler Complete; Future            Phone/MyChart follow up if tests abnormal.    Return in about 1 month (around 7/28/2021). or sooner if necessary. I have reviewed myfindings and recommendations with Riaz Jama. Gerry Vaughn.  Jose Mclean MD, M.D

## 2021-07-05 PROBLEM — E66.01 SEVERE OBESITY (BMI 35.0-35.9 WITH COMORBIDITY) (HCC): Status: ACTIVE | Noted: 2019-08-19

## 2021-07-05 PROBLEM — G47.33 OSA (OBSTRUCTIVE SLEEP APNEA): Status: ACTIVE | Noted: 2021-07-05

## 2021-07-05 ASSESSMENT — ENCOUNTER SYMPTOMS
SHORTNESS OF BREATH: 0
WHEEZING: 0
NAUSEA: 0
COUGH: 0
ABDOMINAL PAIN: 0
VOMITING: 0

## 2021-07-13 ENCOUNTER — OFFICE VISIT (OUTPATIENT)
Dept: PODIATRY | Age: 71
End: 2021-07-13
Payer: MEDICARE

## 2021-07-13 VITALS — BODY MASS INDEX: 35.98 KG/M2 | WEIGHT: 257 LBS | HEIGHT: 71 IN

## 2021-07-13 DIAGNOSIS — R26.2 DIFFICULTY WALKING: ICD-10-CM

## 2021-07-13 DIAGNOSIS — M76.61 ACHILLES BURSITIS OF RIGHT LOWER EXTREMITY: Primary | ICD-10-CM

## 2021-07-13 DIAGNOSIS — R60.0 LOCALIZED EDEMA: ICD-10-CM

## 2021-07-13 PROCEDURE — 1123F ACP DISCUSS/DSCN MKR DOCD: CPT | Performed by: PODIATRIST

## 2021-07-13 PROCEDURE — 99213 OFFICE O/P EST LOW 20 MIN: CPT | Performed by: PODIATRIST

## 2021-07-13 PROCEDURE — G8427 DOCREV CUR MEDS BY ELIG CLIN: HCPCS | Performed by: PODIATRIST

## 2021-07-13 PROCEDURE — G8417 CALC BMI ABV UP PARAM F/U: HCPCS | Performed by: PODIATRIST

## 2021-07-13 PROCEDURE — 3017F COLORECTAL CA SCREEN DOC REV: CPT | Performed by: PODIATRIST

## 2021-07-13 PROCEDURE — 4040F PNEUMOC VAC/ADMIN/RCVD: CPT | Performed by: PODIATRIST

## 2021-07-13 PROCEDURE — 29580 STRAPPING UNNA BOOT: CPT | Performed by: PODIATRIST

## 2021-07-13 PROCEDURE — 1036F TOBACCO NON-USER: CPT | Performed by: PODIATRIST

## 2021-07-13 NOTE — PROGRESS NOTES
Patient is in today for evaluation of right foot/achilles. Patient says about 3 weeks ago he started getting some pain on the lower right side of his heel. pcp is Darryn Garcia MD  Last ov 6/28/21

## 2021-07-13 NOTE — PROGRESS NOTES
21     Domenico Morales    : 1950   Sex: male    Age: 72 y.o. Patient's PCP/Provider is:  Brayan Prince. MD Jose    Subjective:  Patient is seen today for evaluation regarding new onset swelling and discomfort into the posterior right heel region. Patient noticed this over the last several days while working out in his yard. He does wear his good supportive shoe gear with all ambulatory activities as instructed. No other additional abnormalities noted at this time. Chief Complaint   Patient presents with    Foot Pain     right achilles/foot        ROS:  Const: Positives and pertinent negatives as per HPI. Musculo: Denies symptoms other than stated above. Neuro: Denies symptoms other than stated above. Skin: Denies symptoms other than stated above. Current Medications:    Current Outpatient Medications:     metFORMIN (GLUCOPHAGE-XR) 500 MG extended release tablet, TAKE TWO TABLETS BY MOUTH EVERY DAY WITH BREAKFAST, Disp: 60 tablet, Rfl: 5    lisinopril (PRINIVIL;ZESTRIL) 5 MG tablet, Take 0.5 tablets by mouth daily, Disp: 45 tablet, Rfl: 1    FLUoxetine (PROZAC) 40 MG capsule, TAKE ONE CAPSULE BY MOUTH DAILY, Disp: 90 capsule, Rfl: 2    amLODIPine (NORVASC) 10 MG tablet, TAKE ONE TABLET BY MOUTH DAILY, Disp: 90 tablet, Rfl: 2    donepezil (ARICEPT) 10 MG tablet, Take 1 tablet by mouth nightly, Disp: 90 tablet, Rfl: 3    blood glucose test strips (ONETOUCH VERIO) strip, USE TO CHECK BLOOD SUGAR DAILY.   SWITCH TO WHATEVER BRAND IS COVERED BY INSURANCE, Disp: 100 each, Rfl: 3    atorvastatin (LIPITOR) 20 MG tablet, TAKE ONE TABLET BY MOUTH DAILY, Disp: 90 tablet, Rfl: 5    Lancets 30G MISC, 1 each by Does not apply route daily Pt uses Clear Choice Glucose Meter., Disp: 100 each, Rfl: 0    glucose monitoring kit (FREESTYLE) monitoring kit, 1 kit by Does not apply route daily Check sugars once a day, Disp: 1 kit, Rfl: 0    hydrocortisone 2.5 % ointment, APPLY TOPICALLY 2 TIMES DAILY, Disp: 454 g, Rfl: 0    Blood Pressure Monitor LARY, Use daily to check blood pressure, Disp: 1 Device, Rfl: 0    Glucose Blood (GLUCOSE METER TEST VI), 1 each by In Vitro route daily, Disp: , Rfl:     Calcium Citrate-Vitamin D (CALCET CREAMY BITES) 500-400 MG-UNIT CHEW tablet, Take 1 tablet by mouth daily, Disp: , Rfl:     vitamin D (CHOLECALCIFEROL) 1000 UNIT TABS tablet, Take 1,000 Units by mouth daily, Disp: , Rfl:     vitamin B-12 (CYANOCOBALAMIN) 50 MCG tablet, Take 50 mcg by mouth daily, Disp: , Rfl:     ferrous gluconate (FERGON) 240 (27 Fe) MG tablet, Take 240 mg by mouth 3 times daily (with meals), Disp: , Rfl:     aspirin 81 MG EC tablet, Take 81 mg by mouth daily Stopped 1 week pre op, Disp: , Rfl:     Allergies:  No Known Allergies    Vitals:    07/13/21 1451   Weight: 257 lb (116.6 kg)   Height: 5' 11\" (1.803 m)       Exam:  Neurovascular status unchanged. Mild tenderness noted into the posterior right heel region. Mild edematous issues noted without ecchymotic skin changes present right heel. No skin fissuring or any signs of infection noted right foot. Adequate range of motion right ankle and subtalar joint noted. Surgical site stable right foot. Diagnostic Studies:     No results found. Procedures: An unna boot  compressive wrap was applied to the right lower extremity. It's purpose is to  decrease  the amount of edema present, and to allow proper healing of the soft tissues. The patient was instructed to keep the unna boot clean, dry and intact until the next follow up visit. The patient was instructed to look for signs of redness, irritation, blistering and pain. If these or any other symptoms were to develop, they were advised to contact the office immediately for reevaluation. Plan Per Assessment  Gino Duncan was seen today for foot pain.     Diagnoses and all orders for this visit:    Achilles bursitis of right lower extremity    Localized edema    Difficulty walking      1. Evaluation and management  2. Compression dressing was applied to the symptomatic right lower extremity as described above. 3. Patient was advised continued use of his good supportive shoe gear and OTC insoles with all ambulatory activities. 4. Patient will be followed up at a later date for continued evaluation and management. He was advised to call the office with any questions or concerns in the interim. Seen By:    Val Greene DPM    Electronically signed by Val Greene DPM on 7/13/2021 at 3:22 PM    This note was created using voice recognition software. The note was reviewed however may contain grammatical errors.

## 2021-07-27 ENCOUNTER — OFFICE VISIT (OUTPATIENT)
Dept: PODIATRY | Age: 71
End: 2021-07-27
Payer: MEDICARE

## 2021-07-27 VITALS — BODY MASS INDEX: 35.98 KG/M2 | HEIGHT: 71 IN | WEIGHT: 257 LBS

## 2021-07-27 DIAGNOSIS — M77.51 TENDINITIS OF RIGHT FOOT: Primary | ICD-10-CM

## 2021-07-27 DIAGNOSIS — R26.2 DIFFICULTY WALKING: ICD-10-CM

## 2021-07-27 PROBLEM — M89.8X7 RETROCALCANEAL EXOSTOSIS: Status: RESOLVED | Noted: 2021-01-06 | Resolved: 2021-07-27

## 2021-07-27 PROCEDURE — 3017F COLORECTAL CA SCREEN DOC REV: CPT | Performed by: PODIATRIST

## 2021-07-27 PROCEDURE — G8427 DOCREV CUR MEDS BY ELIG CLIN: HCPCS | Performed by: PODIATRIST

## 2021-07-27 PROCEDURE — 1123F ACP DISCUSS/DSCN MKR DOCD: CPT | Performed by: PODIATRIST

## 2021-07-27 PROCEDURE — 4040F PNEUMOC VAC/ADMIN/RCVD: CPT | Performed by: PODIATRIST

## 2021-07-27 PROCEDURE — 99213 OFFICE O/P EST LOW 20 MIN: CPT | Performed by: PODIATRIST

## 2021-07-27 PROCEDURE — 1036F TOBACCO NON-USER: CPT | Performed by: PODIATRIST

## 2021-07-27 PROCEDURE — G8417 CALC BMI ABV UP PARAM F/U: HCPCS | Performed by: PODIATRIST

## 2021-07-27 NOTE — PROGRESS NOTES
Patient is in today for 2 week follow up of right foot pain. Patient says the unna wrap did help with the pain, but is still having issues with the heel. pcp is Darryn Garcia MD  Last ov 6/28/21

## 2021-07-28 NOTE — PROGRESS NOTES
21     Gerson Meyeru    : 1950   Sex: male    Age: 80 y.o. Patient's PCP/Provider is:  Jerry Vale. MD Jose    Subjective:  Patient is seen today for follow-up regarding residual tendinitis issues right lower extremity. Patient stated that the compression dressing applied last visit did help alleviate a lot of his symptoms. He denies any additional issues at this time. Patient has been resuming his normal exercise activities without issues. No other additional abnormalities noted. Chief Complaint   Patient presents with    Foot Pain     right        ROS:  Const: Positives and pertinent negatives as per HPI. Musculo: Denies symptoms other than stated above. Neuro: Denies symptoms other than stated above. Skin: Denies symptoms other than stated above. Current Medications:    Current Outpatient Medications:     metFORMIN (GLUCOPHAGE-XR) 500 MG extended release tablet, TAKE TWO TABLETS BY MOUTH EVERY DAY WITH BREAKFAST, Disp: 60 tablet, Rfl: 5    lisinopril (PRINIVIL;ZESTRIL) 5 MG tablet, Take 0.5 tablets by mouth daily, Disp: 45 tablet, Rfl: 1    FLUoxetine (PROZAC) 40 MG capsule, TAKE ONE CAPSULE BY MOUTH DAILY, Disp: 90 capsule, Rfl: 2    amLODIPine (NORVASC) 10 MG tablet, TAKE ONE TABLET BY MOUTH DAILY, Disp: 90 tablet, Rfl: 2    donepezil (ARICEPT) 10 MG tablet, Take 1 tablet by mouth nightly, Disp: 90 tablet, Rfl: 3    blood glucose test strips (ONETOUCH VERIO) strip, USE TO CHECK BLOOD SUGAR DAILY.   SWITCH TO WHATEVER BRAND IS COVERED BY INSURANCE, Disp: 100 each, Rfl: 3    atorvastatin (LIPITOR) 20 MG tablet, TAKE ONE TABLET BY MOUTH DAILY, Disp: 90 tablet, Rfl: 5    Lancets 30G MISC, 1 each by Does not apply route daily Pt uses Clear Choice Glucose Meter., Disp: 100 each, Rfl: 0    glucose monitoring kit (FREESTYLE) monitoring kit, 1 kit by Does not apply route daily Check sugars once a day, Disp: 1 kit, Rfl: 0    hydrocortisone 2.5 % ointment, APPLY TOPICALLY 2 TIMES DAILY, Disp: 454 g, Rfl: 0    Blood Pressure Monitor LARY, Use daily to check blood pressure, Disp: 1 Device, Rfl: 0    Glucose Blood (GLUCOSE METER TEST VI), 1 each by In Vitro route daily, Disp: , Rfl:     Calcium Citrate-Vitamin D (CALCET CREAMY BITES) 500-400 MG-UNIT CHEW tablet, Take 1 tablet by mouth daily, Disp: , Rfl:     vitamin D (CHOLECALCIFEROL) 1000 UNIT TABS tablet, Take 1,000 Units by mouth daily, Disp: , Rfl:     vitamin B-12 (CYANOCOBALAMIN) 50 MCG tablet, Take 50 mcg by mouth daily, Disp: , Rfl:     ferrous gluconate (FERGON) 240 (27 Fe) MG tablet, Take 240 mg by mouth 3 times daily (with meals), Disp: , Rfl:     aspirin 81 MG EC tablet, Take 81 mg by mouth daily Stopped 1 week pre op, Disp: , Rfl:     Allergies:  No Known Allergies    Vitals:    07/27/21 1116   Weight: 257 lb (116.6 kg)   Height: 5' 11\" (1.803 m)       Exam:  Neurovascular status unchanged. Increased range of motion noted right ankle and subtalar joint. Minimal edematous issues noted posterior aspect right hindfoot region. No ecchymotic skin changes present right lower extremity. Improved gait noted upon evaluation. Diagnostic Studies:     No results found. Procedures:    None    Plan Per Assessment  Yolanda Diana was seen today for foot pain. Diagnoses and all orders for this visit:    Tendinitis of right foot    Difficulty walking      1. Evaluation and management  2. We did discuss continued stretching and strengthening exercises to be performed on a daily basis with his exercise activities. 3. Patient is to continue with his compression socks and current shoe gear with all ambulatory activities. 4. Patient will be followed up at a later date if any further Podiatric issues arise. Seen By:    Rocael Stevens DPM    Electronically signed by Rocael Stevens DPM on 7/28/2021 at 11:56 AM    This note was created using voice recognition software.   The note was reviewed however may contain grammatical errors.

## 2021-08-10 ENCOUNTER — OFFICE VISIT (OUTPATIENT)
Dept: SLEEP MEDICINE | Age: 71
End: 2021-08-10
Payer: MEDICARE

## 2021-08-10 VITALS
SYSTOLIC BLOOD PRESSURE: 163 MMHG | RESPIRATION RATE: 16 BRPM | HEIGHT: 71 IN | DIASTOLIC BLOOD PRESSURE: 84 MMHG | BODY MASS INDEX: 34.59 KG/M2 | WEIGHT: 247.1 LBS | HEART RATE: 98 BPM | OXYGEN SATURATION: 97 %

## 2021-08-10 DIAGNOSIS — G47.33 OBSTRUCTIVE SLEEP APNEA: Primary | ICD-10-CM

## 2021-08-10 DIAGNOSIS — R03.0 ELEVATED BLOOD PRESSURE READING: ICD-10-CM

## 2021-08-10 DIAGNOSIS — E66.9 OBESITY, UNSPECIFIED CLASSIFICATION, UNSPECIFIED OBESITY TYPE, UNSPECIFIED WHETHER SERIOUS COMORBIDITY PRESENT: ICD-10-CM

## 2021-08-10 DIAGNOSIS — G47.09 OTHER INSOMNIA: ICD-10-CM

## 2021-08-10 PROCEDURE — 99204 OFFICE O/P NEW MOD 45 MIN: CPT | Performed by: NURSE PRACTITIONER

## 2021-08-10 PROCEDURE — G8427 DOCREV CUR MEDS BY ELIG CLIN: HCPCS | Performed by: NURSE PRACTITIONER

## 2021-08-10 PROCEDURE — 1123F ACP DISCUSS/DSCN MKR DOCD: CPT | Performed by: NURSE PRACTITIONER

## 2021-08-10 PROCEDURE — G8417 CALC BMI ABV UP PARAM F/U: HCPCS | Performed by: NURSE PRACTITIONER

## 2021-08-10 PROCEDURE — 1036F TOBACCO NON-USER: CPT | Performed by: NURSE PRACTITIONER

## 2021-08-10 PROCEDURE — 4040F PNEUMOC VAC/ADMIN/RCVD: CPT | Performed by: NURSE PRACTITIONER

## 2021-08-10 PROCEDURE — 3017F COLORECTAL CA SCREEN DOC REV: CPT | Performed by: NURSE PRACTITIONER

## 2021-08-10 ASSESSMENT — SLEEP AND FATIGUE QUESTIONNAIRES
ESS TOTAL SCORE: 11
HOW LIKELY ARE YOU TO NOD OFF OR FALL ASLEEP WHILE SITTING INACTIVE IN A PUBLIC PLACE: 0
HOW LIKELY ARE YOU TO NOD OFF OR FALL ASLEEP WHILE SITTING QUIETLY AFTER LUNCH WITHOUT ALCOHOL: 3
HOW LIKELY ARE YOU TO NOD OFF OR FALL ASLEEP WHILE LYING DOWN TO REST IN THE AFTERNOON WHEN CIRCUMSTANCES PERMIT: 3
HOW LIKELY ARE YOU TO NOD OFF OR FALL ASLEEP WHILE SITTING AND TALKING TO SOMEONE: 0
HOW LIKELY ARE YOU TO NOD OFF OR FALL ASLEEP IN A CAR, WHILE STOPPED FOR A FEW MINUTES IN TRAFFIC: 1
HOW LIKELY ARE YOU TO NOD OFF OR FALL ASLEEP WHEN YOU ARE A PASSENGER IN A CAR FOR AN HOUR WITHOUT A BREAK: 2
HOW LIKELY ARE YOU TO NOD OFF OR FALL ASLEEP WHILE WATCHING TV: 1
HOW LIKELY ARE YOU TO NOD OFF OR FALL ASLEEP WHILE SITTING AND READING: 1

## 2021-08-10 NOTE — PATIENT INSTRUCTIONS
It was a pleasure meeting you today Lisa Artis! Summary of Today's Visit     -Continue current device  -Will order supplies  -Will attempt to order new PAP device based on your 2013 sleep study-  -Will also attempt to link current device to computer so I can view your settings. Please call our sleep nurses to schedule a follow up at - [phone] option 2              1. Continue using your machine nightly        -Request all data downloads be sent to my nurse        2. Contact your DME company about supplies or issues with your machine. As a general guideline, please replace your:  -CPAP mask every 3-6 months  -CPAP hose  every 3-6 months  -Filter every 2-4 weeks  -CPAP/BiPAP/ASV replacements every 5-7+ years     3. Continue healthy weight loss/stabilization, as this is recommended as a long-term intervention. 4. Please do not drive or operate machinery when you feel fatigued/sleepy/drowsy      5. Please try to sleep between 6-8 hours nightly with the CPAP mask    6. Please avoid sedatives, alcohol and caffeinated drinks at/near bed time. 7. Please call your supply (DME) company with any issues with your PAP device. Please call our office with any issues pertaining to the therapy.   ----Please note that complications of GILDARDO if not treated can increase risk for: systemic hypertension, pulmonary hypertension, cardiovascular morbidities (heart attack and stroke), car accidents and all cause mortality. 8. Please note that complications of GILDARDO if not treated can increase risk for: systemic hypertension , pulmonary hypertension (high blood pressure in the lungs), cardiovascular morbidities (heart attack and stroke), car accidents and all cause mortality (increased risk of death).       For general questions or scheduling issues, call my nurse at [phone] option [address], [place] [phone] option [address] [place] ----------------------------------------------------------    Common Issues and Solutions     Pillow is dislodging mask - Consider getting a CPAP pillow or switching to a mask with the hose at the top. Dry Mouth - Adjust Humidity and/or try Biotene Gel. (Excessive leak can also cause this)     Leak - Please call your equipment provider  as they may need to adjust your mask. Difficulty tolerating the PAP mask - Contact your equipment company to try a different mask, we can order a \"mini sleep study\"with the sleep tech to try different masks/settings of pressure, also we have a sleep psychologist to help with anxiety related to wearing the PAP mask      ----------------------------------------------------------     For Questions and Concerns: In case of difficulty with your PAP device or mask interface, please FIRST contact your 2 [address] (The company who provides you the CPAP/BiPAP/ASV machine/supplies).      If you need the number for any other [employer] company, please call my Nurse at [phone] option 2     For questions concerning your [place] appointment: Call [phone] option 2  SLEEP LAB SCHEDULING:        ----------------------------------------------------------

## 2021-08-10 NOTE — PROGRESS NOTES
**[address] [facility] Sleep Medicine    Patient Name: Clarita Mathews  Age: 80 y.o.   : 1950    Date of Visit: 8/10/21    HPI   Clarita Mathews is a 80 y.o. male with  has a past medical history of CAD (coronary artery disease), Dementia ([facility] DM (diabetes mellitus) ([facility] cardiovascular stress test (2020), Hyperlipidemia, Hypertension, Moderate episode of recurrent major depressive disorder ([facility] (2018), My[facility] infarction ([facility] (), and Osteoarthritis. He presents with his wife as a new patient to Sleep Clinic, referred by Dr. Loyola, for Sleep Apnea   . Patient presents today for management of sleep apnea. He was diagnosed many years ago (he is unsure of exact year or of the severity) with sleep apnea and was prescribed CPAP thereafter. He used his machine for years and then stopped once he had a repeat sleep study and was told that he no longer needed it. He is unsure of what the pressure settings were and his machine is anywhere from 11to 8years old. It is a ResMed device. Recently, patient started waking up with headaches so he took it upon himself to restart using his CPAP machine and after a few weeks of use is feeling much better with resolution of morning headaches. He is currently using a full facemask and does not appreciate significant leak. He feels his pressures are adequate although he is unsure of what they are set up. He has not been getting recent supplies and has been using mask and tubing from several years ago. He is feeling much better with CPAP use and is interested in obtaining a new machine and for the continued management of his sleep apnea symptoms. His weight is stable since his last sleep study in 2019. He is predominantly a right-sided sleeper but he will sometimes sleep supine as well. He was using [facility] [employer] as his [employer] company. His wife reports that he does snore without CPAP.   Patient admits that he did have dry mouth prior to using CPAP, forgetfulness, and daytime sleepiness. He denies drowsy driving. Patient sometimes struggles with initiating sleep sometimes taking 30 to 45 minutes to fall asleep. He will rarely wake up through the night. If he struggles, he may sometimes take an over-the-counter sleep aid. He does drink about 1 pot of coffee throughout the day. He does feel tired during the day and will nap most days. He does attempt to stay active through the day although he admits that this year he has been a little less active due to chronic foot pain and postsurgical changes. DME: [facility].   Benefits: Resolution of a.m. headaches, better quality of sleep. Sleep History    Bed time: 11 pm     Wake time: 8-10:30 am Sometimes later    Sleep Latency (min): 30 min   Sleep Medications: Sometimes will take the OTC supplement  Awakenings:  Rare  / Erin Harmon in bed  / falls back asleep quickly   Estimated Sleep time (hours): 8-9    Daytime Naps: Yes, most days, tries to stay active  Sleep disturbances: None  Sleep Location: Bed  Sleep environment: Sleeps alone  Occupation: Retired    SLEEP HYGIENE     Activities before bed: TV, gardens  Caffeine:  Occasional 1 pot a day- trying to cut back   Coffee    0   Soda    0   Tea  Alcohol: rare  Tobacco: N       Sleep ROS     Snoring: Y   Witnessed apneas: N  AM Headache: Prior to CPAP  Dry Mouth: Prior to CPAP  Daytime Sleepiness: Y  Difficulty remembering things: Y  MVA  or near miss accident due to sleepiness in the past? N  Tonsillectomy? Y, removed during childhood  Have you lost or gained weight recently? Stable        PARASOMNIAS/ NARCOLEPSY:  Hypnogogic/Hynopompic Hallucinations: N   Sleep paralysis: N  Cataplexy: N   REM behavior symptoms: \"jumpy\" during the night  Nightmare: N   Sleep Walking: N  Sleep Talking: Y  RLS Symptoms: N    Sleep Study History: 4/11/2013 PSG at [address] [place] [address] during REM sleep 22    SPO2 michelle 70%.      4/26/2013   titration study recommended BiPAP 13/9 centimeters of water.  SPO2 90%     3/21/2018 PSG AHI 1.3    mean oxygenation 95%   sleep efficiency mildly decreased    11/3/2019 PSG performed at [facility] AHI 0.9    RDI 6.6    supine RDI 11 SPO2 michelle 90%    Past Medical History:  Past Medical History:   Diagnosis Date    CAD (coronary artery disease)     Dementia ([facility]     early stages  just short term memory loss currently    DM (diabetes mellitus) ([facility]     Hx of cardiovascular stress test 2020    Hyperlipidemia     Hypertension     Moderate episode of recurrent major depressive disorder ([facility] 2018    Myocardial infarction ([facility] 1995    slight mild    Osteoarthritis        Past Surgical History:        Procedure Laterality Date    ANTERIOR CRUCIATE LIGAMENT REPAIR  2002 left knee    COLONOSCOPY      HEEL SPUR SURGERY Right 2021    RESECTION EXOSTOSIS RIGHT FOOT, REPAIR ACHILLES TENDON RIGHT (CPT 60100) ([facility]X) performed by Dionicio Carrion DPM at [address] [place] [address],[address] Floor   right    right knee    OTHER SURGICAL HISTORY  2021    resection retrocalcaneal exostosia right foot, repair achilles tendon right foot    PENILE PROSTHESIS      CHET-EN-Y GASTRIC BYPASS  2004 Dr. Yosef Grewal  2004 Dr. Guido Parks  12/15/2004 Dr. Christianne Limon  2007 Laparoscopic with mesh, Dr Thomas Orn       Allergies:  has No Known Allergies. Social History:    Social History     Tobacco Use    Smoking status: Former Smoker     Packs/day: 0.50     Years: 5.00     Pack years: 2.50     Types: Cigarettes     Quit date: 1970     Years since quittin.6    Smokeless tobacco: Never Used   Vaping Use    Vaping Use: Never used   Substance Use Topics    Alcohol use: No     Comment: was an alcohol until .  Went to [place] [id number] then had went to a doctor in [facility], biochemical    2 CUPS OF COFFEE A DAY     Drug use: No        Family History:       Problem Relation Age of Onset    Heart Disease Mother     Heart Disease Father     High Blood Pressure Father     High Blood Pressure Sister     Breast Cancer Sister     High Blood Pressure Brother     Heart Disease Brother        Current Medications:    Current Outpatient Medications:     metFORMIN (GLUCOPHAGE-XR) 500 MG extended release tablet, TAKE TWO TABLETS BY MOUTH EVERY DAY WITH BREAKFAST, Disp: 60 tablet, Rfl: 5    lisinopril (PRINIVIL;ZESTRIL) 5 MG tablet, Take 0.5 tablets by mouth daily, Disp: 45 tablet, Rfl: 1    FLUoxetine (PROZAC) 40 MG capsule, TAKE ONE CAPSULE BY MOUTH DAILY, Disp: 90 capsule, Rfl: 2    amLODIPine (NORVASC) 10 MG tablet, TAKE ONE TABLET BY MOUTH DAILY, Disp: 90 tablet, Rfl: 2    donepezil (ARICEPT) 10 MG tablet, Take 1 tablet by mouth nightly, Disp: 90 tablet, Rfl: 3    blood glucose test strips (ONETOUCH VERIO) strip, USE TO CHECK BLOOD SUGAR DAILY.   SWITCH TO WHATEVER BRAND IS COVERED BY INSURANCE, Disp: 100 each, Rfl: 3    atorvastatin (LIPITOR) 20 MG tablet, TAKE ONE TABLET BY MOUTH DAILY, Disp: 90 tablet, Rfl: 5    Lancets 30G MISC, 1 each by Does not apply route daily Pt uses Clear Choice Glucose Meter., Disp: 100 each, Rfl: 0    glucose monitoring kit (FREESTYLE) monitoring kit, 1 kit by Does not apply route daily Check sugars once a day, Disp: 1 kit, Rfl: 0    hydrocortisone 2.5 % ointment, APPLY TOPICALLY 2 TIMES DAILY, Disp: 454 g, Rfl: 0    Blood Pressure Monitor LARY, Use daily to check blood pressure, Disp: 1 Device, Rfl: 0    Glucose Blood (GLUCOSE METER TEST VI), 1 each by In Vitro route daily, Disp: , Rfl:     Calcium Citrate-Vitamin D (CALCET CREAMY BITES) 500-400 MG-UNIT CHEW tablet, Take 1 tablet by mouth daily, Disp: , Rfl:     vitamin D (CHOLECALCIFEROL) 1000 UNIT TABS tablet, Take 1,000 Units by mouth daily, Disp: , Rfl:     vitamin B-12 (CYANOCOBALAMIN) 50 MCG tablet, Take 50 mcg by mouth daily, Disp: , Rfl:     ferrous gluconate (FERGON) 240 (27 Fe) MG tablet, Take 240 mg by mouth 3 times daily (with meals), Disp: , Rfl:     aspirin 81 MG EC tablet, Take 81 mg by mouth daily Stopped 1 week pre op, Disp: , Rfl:     Sleep Medicine 8/10/2021   Sitting and reading 1   Watching TV 1   Sitting, inactive in a public place (e.g. a theatre or a meeting) 0   As a passenger in a car for an hour without a break 2   Lying down to rest in the afternoon when circumstances permit 3   Sitting and talking to someone 0   Sitting quietly after a lunch without alcohol 3   In a car, while stopped for a few minutes in traffic 1   Total score 11   Neck circumference 18.5       Review of Systems:    Constitutional: no chills, no fever   Eyes: no blurred vision and no eyesight problems. ENT: no hoarseness and no sore throat. Cardiovascular: no chest pain,   Respiratory: no cough, no shortness of breath   Gastrointestinal:  no nausea,  no vomiting, no diarrhea. Musculoskeletal: no arthralgias, no back pain and no myalgias. Integumentary: no rashes or lacerations. Neurological:  no diplopia, no dizziness,  no headache, no memory changes,  and no tingling. Endocrine: No chills    Objective:   PHYSICAL EXAM:    BP (!) 163/84 (Site: Left Upper Arm, Position: Sitting, Cuff Size: Large Adult)   Pulse 98   Resp 16   Ht 5' 11\" (1.803 m)   Wt 247 lb 1.6 oz (112.1 kg)   SpO2 97%   BMI 34.46 kg/m²     Physical exam:  Gen: No acute distress. BMI of Body mass index is 34.46 kg/m². Eyes: PERRL. No sclera icterus. No conjunctival injection. ENT: No nasal/oral discharge. Pharynx clear. Mallampati class- 3  Tonsils- 1  Neck: Trachea midline. No obvious mass. Neck circumference Neck circumference: 18.5   Resp: No accessory muscle use. No crackles. No wheezes. No rhonchi. CV: Regular rate. Regular rhythm. No murmur or rub. Skin: Warm and dry. No bleeding observed   M/S: No cyanosis. No obvious joint deformity. Neuro: Awake. Alert. Moves all four extremities. Psych: Alert and oriented. No anxiety. No data download available today. Reported compliance:      Assessment:      Santa Shin was seen today for sleep apnea. Diagnoses and all orders for this visit:    Obstructive sleep apnea  -     DME Order for CPAP as OP  -     DME Order for CPAP as OP    ·    Plan:     Jovi Chand is a 80 y.o. male that  has a past medical history of CAD (coronary artery disease), Dementia ([facility] DM (diabetes mellitus) ([facility] cardiovascular stress test (06/16/2020), Hyperlipidemia, Hypertension, Moderate episode of recurrent major depressive disorder ([facility] (5/17/2018), Myocardial infarction ([facility] (1995), and Osteoarthritis. He presents as a new patient to Sleep Clinic for management Sleep Apnea  . He stopped using his CPAP device for years after a sleep study in 2018 showed that he \"no longer needed it \". Over the past few months patient has been waking up with a.m. headaches and restarted using his device. He finds that he has resolution of a.m. headaches when using CPAP and feels more rested with use. He is unsure of the settings. His machine is 11to 8years old. 1. Obstructive Sleep Apnea    -DME contacted to link device if possible, his machine may be too old to link.  -Presence of sleep apnea may be underestimated on his prior two sleep studies due to reduced sleep efficiency and reduced supine sleep during study. -Advised to continue with CPAP use, will attempt to order new APAP given that this machine is 11to 8years old and his sleep study from 2013 demonstrated a mild to moderate obstructive sleep apnea and patient is exhibiting symptoms.   Will order APAP with settings that range from 4 to 15 cm of water  -Patient aware that a new sleep study may be needed, but at this time we will try to obtain new machine based on 2013 sleep study per patient request.  - DME is [id number] [facility].  - Patient is using a full face mask. No significant leak. -Will put in supply order as well for current machine.  -Discussed pathophysiology of GILDARDO and its impact on daily well-being, as well as cardiometabolic and neurocognitive health (particularly in moderate-severe cases). -Patient understands that CPAP should be worn every night for the duration of the night (in order to not miss therapy during early-morning REM period) for maximum benefit. -Advised to call the office if he did not hear from Anisha Schaffer  within 1 to 2 weeks.  -He is aware of the [employer]ics recall and understands that he may need to use another [employer] company or have a wait to obtain machine due to ongoing demand. 2.  Insomnia     - May be multifactorial: recent inactivity + possibly undertreated GILDARDO + increased caffeine intake in the evening.  -Will assess for improvement with PAP therapy and consider Cognitive Behavioral Therapy for Insomnia if persistent.  -Advised to stop caffeine after 1 PM.  -Try to stay a little more active during the day in hopes to promote sleep at night. 3. Obesity. Body mass index is 34.46 kg/m².     -Healthy weight loss advised  -Discussed impact of weight gain on GILDARDO severity. Patient understands that GILDARDO severity may improve with weight loss but no guarantee of cure can be made. 4.  Elevated blood pressure reading  -Elevated BP in clinic today. The patient did not have any alarm symptoms today including, but not limited to: blurry vision, dizziness, confusion, headache, weakness, chest pain, or shortness of breath. Advised to go to ER if symptoms occur. Follow up: Return in about 3 months (around 11/10/2021) for Follow up for sleep apnea.     Deven Payan, BRITTANIE-CNP  [address] [facility] -[phone] option 2  f- [phone]

## 2021-08-31 ENCOUNTER — TELEPHONE (OUTPATIENT)
Dept: FAMILY MEDICINE CLINIC | Age: 71
End: 2021-08-31

## 2021-08-31 NOTE — TELEPHONE ENCOUNTER
----- Message from Luis Enrique Lund sent at 8/31/2021 10:54 AM EDT -----  Subject: Message to Provider    QUESTIONS  Information for Provider? Patient had foot surgery back in February 2021   with [facility]). In their last follow up his foot was   still bothering him and was told it could take up to a year to feel   better. Patients wife called in and said he is still experiencing foot   pain. She is wondering if patient could get an X-ray done . Please give   them a call as soon as you can.   ---------------------------------------------------------------------------  --------------  CALL BACK INFO  What is the best way for the office to contact you? OK to leave message on   voicemail  Preferred Call Back Phone Number? [phone]  ---------------------------------------------------------------------------  --------------  SCRIPT ANSWERS  Relationship to Patient? Other  Representative Name? Brenda  Additional information verified (besides Name and Date of Birth)? Address  (Is the patient requesting to see the provider for a procedure?)?  Yes

## 2021-09-01 ENCOUNTER — OFFICE VISIT (OUTPATIENT)
Dept: PODIATRY | Age: 71
End: 2021-09-01
Payer: MEDICARE

## 2021-09-01 VITALS — WEIGHT: 255 LBS | BODY MASS INDEX: 35.7 KG/M2 | HEIGHT: 71 IN

## 2021-09-01 DIAGNOSIS — R26.2 DIFFICULTY WALKING: ICD-10-CM

## 2021-09-01 DIAGNOSIS — M79.671 RIGHT FOOT PAIN: Primary | ICD-10-CM

## 2021-09-01 DIAGNOSIS — E11.51 TYPE II DIABETES MELLITUS WITH PERIPHERAL CIRCULATORY DISORDER (HCC): ICD-10-CM

## 2021-09-01 DIAGNOSIS — M79.671 RIGHT FOOT PAIN: ICD-10-CM

## 2021-09-01 DIAGNOSIS — M77.51 TENDINITIS OF RIGHT FOOT: Primary | ICD-10-CM

## 2021-09-01 PROCEDURE — 2022F DILAT RTA XM EVC RTNOPTHY: CPT | Performed by: PODIATRIST

## 2021-09-01 PROCEDURE — 3051F HG A1C>EQUAL 7.0%<8.0%: CPT | Performed by: PODIATRIST

## 2021-09-01 PROCEDURE — 99213 OFFICE O/P EST LOW 20 MIN: CPT | Performed by: PODIATRIST

## 2021-09-01 PROCEDURE — 1123F ACP DISCUSS/DSCN MKR DOCD: CPT | Performed by: PODIATRIST

## 2021-09-01 PROCEDURE — 4040F PNEUMOC VAC/ADMIN/RCVD: CPT | Performed by: PODIATRIST

## 2021-09-01 PROCEDURE — G8427 DOCREV CUR MEDS BY ELIG CLIN: HCPCS | Performed by: PODIATRIST

## 2021-09-01 PROCEDURE — G8417 CALC BMI ABV UP PARAM F/U: HCPCS | Performed by: PODIATRIST

## 2021-09-01 PROCEDURE — 3017F COLORECTAL CA SCREEN DOC REV: CPT | Performed by: PODIATRIST

## 2021-09-01 PROCEDURE — 1036F TOBACCO NON-USER: CPT | Performed by: PODIATRIST

## 2021-09-01 NOTE — PROGRESS NOTES
Patient is in today for evaluation of right foot pain. Patient is still having pain in the right foot even after surgery. pcp is Darryn Garcia MD  Last ov 6/28/21

## 2021-09-02 NOTE — PROGRESS NOTES
21     Johana Lopez    : 1950   Sex: male    Age: 80 y.o. Patient's PCP/Provider is:  Wagner Mojica. MD Jose    Subjective:  Patient is seen today for evaluation regarding recurrent tendinitis issues into his right lower extremity. She stated the issues have been going on over the last several weeks even with conservative care options provided. They wanted to discuss other potential treatment options available at this time. Chief Complaint   Patient presents with    Foot Pain     right foot        ROS:  Const: Positives and pertinent negatives as per HPI. Musculo: Denies symptoms other than stated above. Neuro: Denies symptoms other than stated above. Skin: Denies symptoms other than stated above. Current Medications:    Current Outpatient Medications:     metFORMIN (GLUCOPHAGE-XR) 500 MG extended release tablet, TAKE TWO TABLETS BY MOUTH EVERY DAY WITH BREAKFAST, Disp: 60 tablet, Rfl: 5    lisinopril (PRINIVIL;ZESTRIL) 5 MG tablet, Take 0.5 tablets by mouth daily, Disp: 45 tablet, Rfl: 1    FLUoxetine (PROZAC) 40 MG capsule, TAKE ONE CAPSULE BY MOUTH DAILY, Disp: 90 capsule, Rfl: 2    amLODIPine (NORVASC) 10 MG tablet, TAKE ONE TABLET BY MOUTH DAILY, Disp: 90 tablet, Rfl: 2    donepezil (ARICEPT) 10 MG tablet, Take 1 tablet by mouth nightly, Disp: 90 tablet, Rfl: 3    blood glucose test strips (ONETOUCH VERIO) strip, USE TO CHECK BLOOD SUGAR DAILY.   SWITCH TO WHATEVER BRAND IS COVERED BY INSURANCE, Disp: 100 each, Rfl: 3    atorvastatin (LIPITOR) 20 MG tablet, TAKE ONE TABLET BY MOUTH DAILY, Disp: 90 tablet, Rfl: 5    Lancets 30G MISC, 1 each by Does not apply route daily Pt uses Clear Choice Glucose Meter., Disp: 100 each, Rfl: 0    glucose monitoring kit (FREESTYLE) monitoring kit, 1 kit by Does not apply route daily Check sugars once a day, Disp: 1 kit, Rfl: 0    hydrocortisone 2.5 % ointment, APPLY TOPICALLY 2 TIMES DAILY, Disp: 454 g, Rfl: 0    Blood Pressure Monitor LARY Use daily to check blood pressure, Disp: 1 Device, Rfl: 0    Glucose Blood (GLUCOSE METER TEST VI), 1 each by In Vitro route daily, Disp: , Rfl:     Calcium Citrate-Vitamin D (CALCET CREAMY BITES) 500-400 MG-UNIT CHEW tablet, Take 1 tablet by mouth daily, Disp: , Rfl:     vitamin D (CHOLECALCIFEROL) 1000 UNIT TABS tablet, Take 1,000 Units by mouth daily, Disp: , Rfl:     vitamin B-12 (CYANOCOBALAMIN) 50 MCG tablet, Take 50 mcg by mouth daily, Disp: , Rfl:     ferrous gluconate (FERGON) 240 (27 Fe) MG tablet, Take 240 mg by mouth 3 times daily (with meals), Disp: , Rfl:     aspirin 81 MG EC tablet, Take 81 mg by mouth daily Stopped 1 week pre op, Disp: , Rfl:     Allergies:  No Known Allergies    Vitals:    09/01/21 1552   Weight: 255 lb (115.7 kg)   Height: 5' 11\" (1.803 m)       Exam:  Neurovascular status unchanged. Tenderness noted to palpation to the posterior right heel region with range of motion and muscle testing performed. No surgical site issues noted posterior right heel. Mild edematous issues noted right lower extremity without ecchymotic skin changes present. Diagnostic Studies:     No results found. Procedures:    None    Plan Per Assessment  Barney Hernandez was seen today for foot pain. Diagnoses and all orders for this visit:    Tendinitis of right foot  -     Amb External Referral For Orthotics    Right foot pain  -     Amb External Referral For Orthotics    Type II diabetes mellitus with peripheral circulatory disorder (HCC)  -     Amb External Referral For Orthotics    Difficulty walking  -     Amb External Referral For Orthotics      1. Evaluation and management  2. We did discuss getting patient evaluated for diabetic shoes and insoles to give him added support with his every day activities. 3. Patient was advised continued diabetic foot care techniques to allow for continued improvement in symptoms.   4. Patient will be followed up at a later date for continued evaluation and care.      Seen By:    Rocael Stevens DPM    Electronically signed by Rocael Stevens DPM on 9/1/2021 at 9:49 PM    This note was created using voice recognition software. The note was reviewed however may contain grammatical errors.

## 2021-10-27 ENCOUNTER — OFFICE VISIT (OUTPATIENT)
Dept: PODIATRY | Age: 71
End: 2021-10-27
Payer: MEDICARE

## 2021-10-27 VITALS — HEIGHT: 71 IN | BODY MASS INDEX: 34.58 KG/M2 | WEIGHT: 247 LBS

## 2021-10-27 DIAGNOSIS — M67.88 ACHILLES TENDINOSIS OF RIGHT LOWER EXTREMITY: Primary | ICD-10-CM

## 2021-10-27 DIAGNOSIS — E11.9 TYPE 2 DIABETES MELLITUS WITHOUT COMPLICATION, WITHOUT LONG-TERM CURRENT USE OF INSULIN (HCC): ICD-10-CM

## 2021-10-27 PROBLEM — S91.331A PUNCTURE WOUND OF RIGHT FOOT: Status: RESOLVED | Noted: 2020-10-02 | Resolved: 2021-10-27

## 2021-10-27 PROCEDURE — 99213 OFFICE O/P EST LOW 20 MIN: CPT | Performed by: PODIATRIST

## 2021-10-27 PROCEDURE — G8484 FLU IMMUNIZE NO ADMIN: HCPCS | Performed by: PODIATRIST

## 2021-10-27 PROCEDURE — 1036F TOBACCO NON-USER: CPT | Performed by: PODIATRIST

## 2021-10-27 PROCEDURE — G8417 CALC BMI ABV UP PARAM F/U: HCPCS | Performed by: PODIATRIST

## 2021-10-27 PROCEDURE — G8427 DOCREV CUR MEDS BY ELIG CLIN: HCPCS | Performed by: PODIATRIST

## 2021-10-27 PROCEDURE — 1123F ACP DISCUSS/DSCN MKR DOCD: CPT | Performed by: PODIATRIST

## 2021-10-27 PROCEDURE — 3017F COLORECTAL CA SCREEN DOC REV: CPT | Performed by: PODIATRIST

## 2021-10-27 PROCEDURE — 3051F HG A1C>EQUAL 7.0%<8.0%: CPT | Performed by: PODIATRIST

## 2021-10-27 PROCEDURE — 4040F PNEUMOC VAC/ADMIN/RCVD: CPT | Performed by: PODIATRIST

## 2021-10-27 PROCEDURE — 2022F DILAT RTA XM EVC RTNOPTHY: CPT | Performed by: PODIATRIST

## 2021-10-27 NOTE — PROGRESS NOTES
10/27/21     Lecom[place] Dash    : 1950   Sex: male    Age: 80 y.o. Patient's PCP/Provider is:  Vivia Paget. Meris, MD    Subjective:  Patient is seen today for follow-up regarding continued evaluation regarding residual and intermittent Achilles inflammation right lower extremity. Patient did get his diabetic insoles approximately 3 weeks ago which she is currently wearing in his existing shoe gear. He denies any recent injuries or changes in activities. Patient has had minimal issues with previous surgical procedure performed right lower extremity. Chief Complaint   Patient presents with    Foot Pain     right foot        ROS:  Const: Positives and pertinent negatives as per HPI. Musculo: Denies symptoms other than stated above. Neuro: Denies symptoms other than stated above. Skin: Denies symptoms other than stated above. Current Medications:    Current Outpatient Medications:     metFORMIN (GLUCOPHAGE-XR) 500 MG extended release tablet, TAKE TWO TABLETS BY MOUTH EVERY DAY WITH BREAKFAST, Disp: 60 tablet, Rfl: 5    FLUoxetine (PROZAC) 40 MG capsule, TAKE ONE CAPSULE BY MOUTH DAILY, Disp: 90 capsule, Rfl: 2    amLODIPine (NORVASC) 10 MG tablet, TAKE ONE TABLET BY MOUTH DAILY, Disp: 90 tablet, Rfl: 2    donepezil (ARICEPT) 10 MG tablet, Take 1 tablet by mouth nightly, Disp: 90 tablet, Rfl: 3    blood glucose test strips (ONETOUCH VERIO) strip, USE TO CHECK BLOOD SUGAR DAILY.   SWITCH TO WHATEVER BRAND IS COVERED BY INSURANCE, Disp: 100 each, Rfl: 3    atorvastatin (LIPITOR) 20 MG tablet, TAKE ONE TABLET BY MOUTH DAILY, Disp: 90 tablet, Rfl: 5    Lancets 30G MISC, 1 each by Does not apply route daily Pt uses Clear Choice Glucose Meter., Disp: 100 each, Rfl: 0    glucose monitoring kit (FREESTYLE) monitoring kit, 1 kit by Does not apply route daily Check sugars once a day, Disp: 1 kit, Rfl: 0    hydrocortisone 2.5 % ointment, APPLY TOPICALLY 2 TIMES DAILY, Disp: 454 g, Rfl: 0    Blood Pressure Monitor LARY, Use daily to check blood pressure, Disp: 1 Device, Rfl: 0    Glucose Blood (GLUCOSE METER TEST VI), 1 each by In Vitro route daily, Disp: , Rfl:     Calcium Citrate-Vitamin D (CALCET CREAMY BITES) 500-400 MG-UNIT CHEW tablet, Take 1 tablet by mouth daily, Disp: , Rfl:     vitamin D (CHOLECALCIFEROL) 1000 UNIT TABS tablet, Take 1,000 Units by mouth daily, Disp: , Rfl:     vitamin B-12 (CYANOCOBALAMIN) 50 MCG tablet, Take 50 mcg by mouth daily, Disp: , Rfl:     ferrous gluconate (FERGON) 240 (27 Fe) MG tablet, Take 240 mg by mouth 3 times daily (with meals), Disp: , Rfl:     aspirin 81 MG EC tablet, Take 81 mg by mouth daily Stopped 1 week pre op, Disp: , Rfl:     lisinopril (PRINIVIL;ZESTRIL) 5 MG tablet, Take 0.5 tablets by mouth daily, Disp: 45 tablet, Rfl: 1    Allergies:  No Known Allergies    Vitals:    10/27/21 1447   Weight: 247 lb (112 kg)   Height: 5' 11\" (1.803 m)       Exam:  Neurovascular status unchanged. No edema or ecchymotic skin changes present into the posterior right heel region. Minimal tenderness noted to the distal Achilles insertional area right lower extremity. Increased range of motion noted right ankle and subtalar joint. Improved gait noted with current shoe gear and custom insoles bilaterally. Diagnostic Studies:     No results found. Procedures:    None    Plan Per Assessment  Magy Penn was seen today for foot pain. Diagnoses and all orders for this visit:    Achilles tendinosis of right lower extremity    Type 2 diabetes mellitus without complication, without long-term current use of insulin ([facility]      1. Evaluation and management  2. We did advised the patient on continued use of the custom insoles with his athletic shoe gear with all daily ambulatory activities. 3. We did discuss additional diabetic foot care techniques with patient in detail today.   4. Patient will be followed up at a later date for continued diabetic foot evaluation and care.      Seen By:    Ivy Ingram DPM    Electronically signed by Ivy Ingram DPM on 10/27/2021 at 3:00 PM    This note was created using voice recognition software. The note was reviewed however may contain grammatical errors.

## 2021-10-27 NOTE — PROGRESS NOTES
Patient is in today for 6 week diabetic insoles check. Patient did get their insoles. Patient says they do help him with walking, but he is still having some pain. pcp is Darryn Garcia MD  Last ov 6/28/21

## 2021-11-16 ENCOUNTER — OFFICE VISIT (OUTPATIENT)
Dept: PODIATRY | Age: 71
End: 2021-11-16
Payer: MEDICARE

## 2021-11-16 VITALS — WEIGHT: 253 LBS | HEIGHT: 71 IN | BODY MASS INDEX: 35.42 KG/M2

## 2021-11-16 DIAGNOSIS — M25.571 PAIN IN RIGHT ANKLE AND JOINTS OF RIGHT FOOT: ICD-10-CM

## 2021-11-16 DIAGNOSIS — M67.88 ACHILLES TENDINOSIS OF RIGHT LOWER EXTREMITY: Primary | ICD-10-CM

## 2021-11-16 DIAGNOSIS — E11.9 TYPE 2 DIABETES MELLITUS WITHOUT COMPLICATION, WITHOUT LONG-TERM CURRENT USE OF INSULIN (HCC): ICD-10-CM

## 2021-11-16 DIAGNOSIS — R26.2 DIFFICULTY WALKING: ICD-10-CM

## 2021-11-16 PROCEDURE — 99213 OFFICE O/P EST LOW 20 MIN: CPT | Performed by: PODIATRIST

## 2021-11-16 PROCEDURE — G8484 FLU IMMUNIZE NO ADMIN: HCPCS | Performed by: PODIATRIST

## 2021-11-16 PROCEDURE — 1123F ACP DISCUSS/DSCN MKR DOCD: CPT | Performed by: PODIATRIST

## 2021-11-16 PROCEDURE — G8417 CALC BMI ABV UP PARAM F/U: HCPCS | Performed by: PODIATRIST

## 2021-11-16 PROCEDURE — 1036F TOBACCO NON-USER: CPT | Performed by: PODIATRIST

## 2021-11-16 PROCEDURE — 2022F DILAT RTA XM EVC RTNOPTHY: CPT | Performed by: PODIATRIST

## 2021-11-16 PROCEDURE — 3051F HG A1C>EQUAL 7.0%<8.0%: CPT | Performed by: PODIATRIST

## 2021-11-16 PROCEDURE — 3017F COLORECTAL CA SCREEN DOC REV: CPT | Performed by: PODIATRIST

## 2021-11-16 PROCEDURE — G8427 DOCREV CUR MEDS BY ELIG CLIN: HCPCS | Performed by: PODIATRIST

## 2021-11-16 PROCEDURE — 4040F PNEUMOC VAC/ADMIN/RCVD: CPT | Performed by: PODIATRIST

## 2021-11-16 NOTE — PROGRESS NOTES
21     Nay Benitez    : 1950   Sex: male    Age: 80 y.o. Patient's PCP/Provider is:  Noel Frankel. MD Jose    Subjective:  Patient is seen today for follow-up regarding recalcitrant discomfort into the medial right heel region. Patient does get discomfort periodically at the surgical site right posterior/medial heel region. Patient has been performing his normal daily activities with his existing shoe gear but does get burning sensation at times into the heel region. Patient denies any recent injuries or changes in activities. Patient did undergo his full physical therapy treatment regimen prescribed after surgery. No other additional abnormalities noted at this time. Chief Complaint   Patient presents with    Foot Pain     right foot        ROS:  Const: Positives and pertinent negatives as per HPI. Musculo: Denies symptoms other than stated above. Neuro: Denies symptoms other than stated above. Skin: Denies symptoms other than stated above. Current Medications:    Current Outpatient Medications:     metFORMIN (GLUCOPHAGE-XR) 500 MG extended release tablet, TAKE TWO TABLETS BY MOUTH EVERY DAY WITH BREAKFAST, Disp: 60 tablet, Rfl: 5    FLUoxetine (PROZAC) 40 MG capsule, TAKE ONE CAPSULE BY MOUTH DAILY, Disp: 90 capsule, Rfl: 2    amLODIPine (NORVASC) 10 MG tablet, TAKE ONE TABLET BY MOUTH DAILY, Disp: 90 tablet, Rfl: 2    donepezil (ARICEPT) 10 MG tablet, Take 1 tablet by mouth nightly, Disp: 90 tablet, Rfl: 3    blood glucose test strips (ONETOUCH VERIO) strip, USE TO CHECK BLOOD SUGAR DAILY.   SWITCH TO WHATEVER BRAND IS COVERED BY INSURANCE, Disp: 100 each, Rfl: 3    atorvastatin (LIPITOR) 20 MG tablet, TAKE ONE TABLET BY MOUTH DAILY, Disp: 90 tablet, Rfl: 5    Lancets 30G MISC, 1 each by Does not apply route daily Pt uses Clear Choice Glucose Meter., Disp: 100 each, Rfl: 0    glucose monitoring kit (FREESTYLE) monitoring kit, 1 kit by Does not apply route daily Check sugars once a day, Disp: 1 kit, Rfl: 0    hydrocortisone 2.5 % ointment, APPLY TOPICALLY 2 TIMES DAILY, Disp: 454 g, Rfl: 0    Blood Pressure Monitor LARY, Use daily to check blood pressure, Disp: 1 Device, Rfl: 0    Glucose Blood (GLUCOSE METER TEST VI), 1 each by In Vitro route daily, Disp: , Rfl:     Calcium Citrate-Vitamin D (CALCET CREAMY BITES) 500-400 MG-UNIT CHEW tablet, Take 1 tablet by mouth daily, Disp: , Rfl:     vitamin D (CHOLECALCIFEROL) 1000 UNIT TABS tablet, Take 1,000 Units by mouth daily, Disp: , Rfl:     vitamin B-12 (CYANOCOBALAMIN) 50 MCG tablet, Take 50 mcg by mouth daily, Disp: , Rfl:     ferrous gluconate (FERGON) 240 (27 Fe) MG tablet, Take 240 mg by mouth 3 times daily (with meals), Disp: , Rfl:     aspirin 81 MG EC tablet, Take 81 mg by mouth daily Stopped 1 week pre op, Disp: , Rfl:     lisinopril (PRINIVIL;ZESTRIL) 5 MG tablet, Take 0.5 tablets by mouth daily, Disp: 45 tablet, Rfl: 1    Allergies:  No Known Allergies    Vitals:    11/16/21 1416   Weight: 253 lb (114.8 kg)   Height: 5' 11\" (1.803 m)       Exam:  VASCULAR: Pedal pulses palpable right lower extremity  NEUROLOGICAL: Epicritic sensations intact right lower extremity. No paresthesias noted upon percussion along the tibial nerve distribution right foot. DERMATOLOGICAL: No edema or ecchymotic skin changes present into the posterior right heel and ankle region. Surgical site incisional area well coapted without issues noted. MUSCULOSKELETAL: Tenderness noted to palpation into the distal/posterior/medial right heel region. No underlying bony prominence, soft tissue mass, or any hyperkeratosis noted right heel. Mild antalgic gait noted upon evaluation. Diagnostic Studies:     No results found. Procedures:    None    Plan Per Assessment  Dia Calvo was seen today for foot pain.     Diagnoses and all orders for this visit:    Achilles tendinosis of right lower extremity    Type 2 diabetes mellitus without complication, without long-term current use of insulin (HCC)    Pain in right ankle and joints of right foot    Difficulty walking      1. Evaluation and management  2. We did review recent x-rays again with patient and his wife in detail today. No acute osseous abnormalities noted. 3. Due to the recalcitrant issues into the right heel region we did recommend obtaining an MRI of the right hindfoot region to rule out any underlying soft tissue and/or bone etiologies for his current symptoms. 4. Patient is to continue wearing his good supportive shoe gear and custom insoles which were also recommended after surgery. 5. Patient will be followed up to discuss MRI results once performed and reviewed. We will discuss further treatment options available at that time. They were advised to call the office with any questions or concerns in the interim. Seen By:    Cam Garcia DPM    Electronically signed by Cam Garcia DPM on 11/16/2021 at 3:06 PM    This note was created using voice recognition software. The note was reviewed however may contain grammatical errors.

## 2021-11-16 NOTE — PROGRESS NOTES
Patient is in today for evaluation of right foot pain. Patient says he is still having daily pain. pcp is Darryn Garcia MD  Last ov 6/28/21

## 2021-12-06 DIAGNOSIS — E11.9 TYPE 2 DIABETES MELLITUS WITHOUT COMPLICATION, WITHOUT LONG-TERM CURRENT USE OF INSULIN (HCC): ICD-10-CM

## 2021-12-06 RX ORDER — ATORVASTATIN CALCIUM 20 MG/1
TABLET, FILM COATED ORAL
Qty: 90 TABLET | Refills: 0 | Status: SHIPPED
Start: 2021-12-06 | End: 2022-02-15 | Stop reason: SDUPTHER

## 2021-12-07 ENCOUNTER — OFFICE VISIT (OUTPATIENT)
Dept: PODIATRY | Age: 71
End: 2021-12-07
Payer: MEDICARE

## 2021-12-07 VITALS — BODY MASS INDEX: 35.42 KG/M2 | HEIGHT: 71 IN | WEIGHT: 253 LBS

## 2021-12-07 DIAGNOSIS — E11.9 TYPE 2 DIABETES MELLITUS WITHOUT COMPLICATION, WITHOUT LONG-TERM CURRENT USE OF INSULIN (HCC): ICD-10-CM

## 2021-12-07 DIAGNOSIS — R26.2 DIFFICULTY WALKING: ICD-10-CM

## 2021-12-07 DIAGNOSIS — M72.2 PLANTAR FASCIITIS: Primary | ICD-10-CM

## 2021-12-07 DIAGNOSIS — M79.671 RIGHT FOOT PAIN: ICD-10-CM

## 2021-12-07 PROCEDURE — G8484 FLU IMMUNIZE NO ADMIN: HCPCS | Performed by: PODIATRIST

## 2021-12-07 PROCEDURE — 2022F DILAT RTA XM EVC RTNOPTHY: CPT | Performed by: PODIATRIST

## 2021-12-07 PROCEDURE — 99213 OFFICE O/P EST LOW 20 MIN: CPT | Performed by: PODIATRIST

## 2021-12-07 PROCEDURE — 1123F ACP DISCUSS/DSCN MKR DOCD: CPT | Performed by: PODIATRIST

## 2021-12-07 PROCEDURE — G8427 DOCREV CUR MEDS BY ELIG CLIN: HCPCS | Performed by: PODIATRIST

## 2021-12-07 PROCEDURE — 1036F TOBACCO NON-USER: CPT | Performed by: PODIATRIST

## 2021-12-07 PROCEDURE — 3017F COLORECTAL CA SCREEN DOC REV: CPT | Performed by: PODIATRIST

## 2021-12-07 PROCEDURE — G8417 CALC BMI ABV UP PARAM F/U: HCPCS | Performed by: PODIATRIST

## 2021-12-07 PROCEDURE — 4040F PNEUMOC VAC/ADMIN/RCVD: CPT | Performed by: PODIATRIST

## 2021-12-07 PROCEDURE — 3051F HG A1C>EQUAL 7.0%<8.0%: CPT | Performed by: PODIATRIST

## 2021-12-07 RX ORDER — MELOXICAM 7.5 MG/1
7.5 TABLET ORAL DAILY PRN
Qty: 30 TABLET | Refills: 0 | Status: SHIPPED
Start: 2021-12-07 | End: 2022-04-06 | Stop reason: CLARIF

## 2021-12-07 NOTE — PROGRESS NOTES
21     Perla العلي    : 1950   Sex: male    Age: 80 y.o. Patient's PCP/Provider is:  Rochelle Parker. MD Jose    Subjective:  Patient is seen today for follow-up regarding continued evaluation regarding pain into his right heel. Patient presents to discuss MRI results. He denies any additional issues at this time. Patient has been wearing his diabetic insoles as instructed with his good supportive shoe gear with all ambulatory activities. Patient denies any recent injury to the right lower extremity. Chief Complaint   Patient presents with    Follow-up     Right foot        ROS:  Const: Positives and pertinent negatives as per HPI. Musculo: Denies symptoms other than stated above. Neuro: Denies symptoms other than stated above. Skin: Denies symptoms other than stated above. Current Medications:    Current Outpatient Medications:     meloxicam (MOBIC) 7.5 MG tablet, Take 1 tablet by mouth daily as needed for Pain, Disp: 30 tablet, Rfl: 0    atorvastatin (LIPITOR) 20 MG tablet, TAKE ONE TABLET BY MOUTH DAILY, Disp: 90 tablet, Rfl: 0    metFORMIN (GLUCOPHAGE-XR) 500 MG extended release tablet, TAKE TWO TABLETS BY MOUTH EVERY DAY WITH BREAKFAST, Disp: 60 tablet, Rfl: 5    FLUoxetine (PROZAC) 40 MG capsule, TAKE ONE CAPSULE BY MOUTH DAILY, Disp: 90 capsule, Rfl: 2    amLODIPine (NORVASC) 10 MG tablet, TAKE ONE TABLET BY MOUTH DAILY, Disp: 90 tablet, Rfl: 2    donepezil (ARICEPT) 10 MG tablet, Take 1 tablet by mouth nightly, Disp: 90 tablet, Rfl: 3    blood glucose test strips (ONETOUCH VERIO) strip, USE TO CHECK BLOOD SUGAR DAILY.   SWITCH TO WHATEVER BRAND IS COVERED BY INSURANCE, Disp: 100 each, Rfl: 3    Lancets 30G MISC, 1 each by Does not apply route daily Pt uses Clear Choice Glucose Meter., Disp: 100 each, Rfl: 0    glucose monitoring kit (FREESTYLE) monitoring kit, 1 kit by Does not apply route daily Check sugars once a day, Disp: 1 kit, Rfl: 0    hydrocortisone 2.5 % ointment, APPLY TOPICALLY 2 TIMES DAILY, Disp: 454 g, Rfl: 0    Blood Pressure Monitor LARY, Use daily to check blood pressure, Disp: 1 Device, Rfl: 0    Glucose Blood (GLUCOSE METER TEST VI), 1 each by In Vitro route daily, Disp: , Rfl:     Calcium Citrate-Vitamin D (CALCET CREAMY BITES) 500-400 MG-UNIT CHEW tablet, Take 1 tablet by mouth daily, Disp: , Rfl:     vitamin D (CHOLECALCIFEROL) 1000 UNIT TABS tablet, Take 1,000 Units by mouth daily, Disp: , Rfl:     vitamin B-12 (CYANOCOBALAMIN) 50 MCG tablet, Take 50 mcg by mouth daily, Disp: , Rfl:     ferrous gluconate (FERGON) 240 (27 Fe) MG tablet, Take 240 mg by mouth 3 times daily (with meals), Disp: , Rfl:     aspirin 81 MG EC tablet, Take 81 mg by mouth daily Stopped 1 week pre op, Disp: , Rfl:     lisinopril (PRINIVIL;ZESTRIL) 5 MG tablet, Take 0.5 tablets by mouth daily, Disp: 45 tablet, Rfl: 1    Allergies:  No Known Allergies    Vitals:    12/07/21 1350   Weight: 253 lb (114.8 kg)   Height: 5' 11\" (1.803 m)       Exam:  Neurovascular status unchanged. Mild tenderness noted into the plantar/medial right heel region. No palpable defect and or deformity noted distal Achilles tendon right lower extremity. No edema or ecchymotic skin changes present right lower extremity. Adequate range of motion right ankle and subtalar joint noted. Diagnostic Studies:     No results found. Procedures:    None    Plan Per Assessment  Sarath Trinidad was seen today for follow-up. Diagnoses and all orders for this visit:    Plantar fasciitis  -     meloxicam (MOBIC) 7.5 MG tablet; Take 1 tablet by mouth daily as needed for Pain    Right foot pain  -     meloxicam (MOBIC) 7.5 MG tablet; Take 1 tablet by mouth daily as needed for Pain    Type 2 diabetes mellitus without complication, without long-term current use of insulin (HCC)    Difficulty walking      1. Evaluation and management  2. We did review MRI results with patient and his wife in detail today.   We did

## 2021-12-13 RX ORDER — LISINOPRIL 5 MG/1
2.5 TABLET ORAL DAILY
Qty: 45 TABLET | Refills: 1 | Status: SHIPPED | OUTPATIENT
Start: 2021-12-13 | End: 2022-04-06

## 2022-02-08 ENCOUNTER — OFFICE VISIT (OUTPATIENT)
Dept: PODIATRY | Age: 72
End: 2022-02-08
Payer: MEDICARE

## 2022-02-08 VITALS — WEIGHT: 253 LBS | HEIGHT: 71 IN | BODY MASS INDEX: 35.42 KG/M2

## 2022-02-08 DIAGNOSIS — R26.2 DIFFICULTY WALKING: ICD-10-CM

## 2022-02-08 DIAGNOSIS — M72.2 PLANTAR FASCIITIS: Primary | ICD-10-CM

## 2022-02-08 DIAGNOSIS — M79.671 RIGHT FOOT PAIN: ICD-10-CM

## 2022-02-08 PROCEDURE — G8417 CALC BMI ABV UP PARAM F/U: HCPCS | Performed by: PODIATRIST

## 2022-02-08 PROCEDURE — G8427 DOCREV CUR MEDS BY ELIG CLIN: HCPCS | Performed by: PODIATRIST

## 2022-02-08 PROCEDURE — 1123F ACP DISCUSS/DSCN MKR DOCD: CPT | Performed by: PODIATRIST

## 2022-02-08 PROCEDURE — 4040F PNEUMOC VAC/ADMIN/RCVD: CPT | Performed by: PODIATRIST

## 2022-02-08 PROCEDURE — G8484 FLU IMMUNIZE NO ADMIN: HCPCS | Performed by: PODIATRIST

## 2022-02-08 PROCEDURE — 99213 OFFICE O/P EST LOW 20 MIN: CPT | Performed by: PODIATRIST

## 2022-02-08 PROCEDURE — 1036F TOBACCO NON-USER: CPT | Performed by: PODIATRIST

## 2022-02-08 PROCEDURE — 3017F COLORECTAL CA SCREEN DOC REV: CPT | Performed by: PODIATRIST

## 2022-02-08 NOTE — PROGRESS NOTES
Patient is in today for 2 month right foot pain. Patient says he is still having pain in the foot with walking and sometimes when he is at rest there will be throbbing pain. pcp is Darryn Garcia MD  Last ov 6/28/21

## 2022-02-11 ENCOUNTER — TELEPHONE (OUTPATIENT)
Dept: FAMILY MEDICINE CLINIC | Age: 72
End: 2022-02-11

## 2022-02-15 DIAGNOSIS — I10 ESSENTIAL HYPERTENSION: ICD-10-CM

## 2022-02-15 DIAGNOSIS — F33.1 MODERATE EPISODE OF RECURRENT MAJOR DEPRESSIVE DISORDER (HCC): ICD-10-CM

## 2022-02-15 DIAGNOSIS — E11.9 TYPE 2 DIABETES MELLITUS WITHOUT COMPLICATION, WITHOUT LONG-TERM CURRENT USE OF INSULIN (HCC): ICD-10-CM

## 2022-02-15 RX ORDER — BLOOD SUGAR DIAGNOSTIC
STRIP MISCELLANEOUS
Refills: 0 | OUTPATIENT
Start: 2022-02-15

## 2022-02-15 RX ORDER — AMLODIPINE BESYLATE 10 MG/1
TABLET ORAL
Qty: 90 TABLET | Refills: 0 | OUTPATIENT
Start: 2022-02-15

## 2022-02-15 RX ORDER — METFORMIN HYDROCHLORIDE 500 MG/1
TABLET, EXTENDED RELEASE ORAL
Qty: 180 TABLET | Refills: 0 | OUTPATIENT
Start: 2022-02-15

## 2022-02-15 RX ORDER — ATORVASTATIN CALCIUM 20 MG/1
TABLET, FILM COATED ORAL
Qty: 90 TABLET | Refills: 0 | OUTPATIENT
Start: 2022-02-15

## 2022-02-15 RX ORDER — FLUOXETINE HYDROCHLORIDE 40 MG/1
CAPSULE ORAL
Qty: 90 CAPSULE | Refills: 0 | OUTPATIENT
Start: 2022-02-15

## 2022-02-17 RX ORDER — FLUOXETINE HYDROCHLORIDE 40 MG/1
CAPSULE ORAL
Qty: 90 CAPSULE | Refills: 0 | Status: SHIPPED
Start: 2022-02-17 | End: 2022-04-06 | Stop reason: SDUPTHER

## 2022-02-17 RX ORDER — BLOOD SUGAR DIAGNOSTIC
STRIP MISCELLANEOUS
Qty: 100 EACH | Refills: 3 | Status: SHIPPED | OUTPATIENT
Start: 2022-02-17 | End: 2022-02-21

## 2022-02-17 RX ORDER — METFORMIN HYDROCHLORIDE 500 MG/1
TABLET, EXTENDED RELEASE ORAL
Qty: 60 TABLET | Refills: 1 | Status: SHIPPED
Start: 2022-02-17 | End: 2022-04-12 | Stop reason: SDUPTHER

## 2022-02-17 RX ORDER — ATORVASTATIN CALCIUM 20 MG/1
TABLET, FILM COATED ORAL
Qty: 90 TABLET | Refills: 0 | Status: SHIPPED
Start: 2022-02-17 | End: 2022-04-06 | Stop reason: SDUPTHER

## 2022-02-17 RX ORDER — AMLODIPINE BESYLATE 10 MG/1
TABLET ORAL
Qty: 90 TABLET | Refills: 0 | Status: SHIPPED
Start: 2022-02-17 | End: 2022-04-06 | Stop reason: SDUPTHER

## 2022-02-21 DIAGNOSIS — E11.9 TYPE 2 DIABETES MELLITUS WITHOUT COMPLICATION, WITHOUT LONG-TERM CURRENT USE OF INSULIN (HCC): ICD-10-CM

## 2022-02-21 RX ORDER — BLOOD SUGAR DIAGNOSTIC
STRIP MISCELLANEOUS
Qty: 100 EACH | Refills: 3 | Status: SHIPPED | OUTPATIENT
Start: 2022-02-21 | End: 2022-02-23 | Stop reason: SDUPTHER

## 2022-02-21 NOTE — TELEPHONE ENCOUNTER
----- Message from Del Lee sent at 2/19/2022 10:44 AM EST -----  Subject: Refill Request    QUESTIONS  Name of Medication? blood glucose test strips (ONETOUCH VERIO) strip  Patient-reported dosage and instructions? once a day   How many days do you have left? 0  Preferred Pharmacy? [facility]  Pharmacy phone number (if available)? [phone]  ---------------------------------------------------------------------------  --------------  CALL BACK INFO  What is the best way for the office to contact you? OK to leave message on   voicemail  Preferred Call Back Phone Number?  [phone]

## 2022-02-23 RX ORDER — BLOOD SUGAR DIAGNOSTIC
STRIP MISCELLANEOUS
Qty: 100 EACH | Refills: 3 | Status: SHIPPED | OUTPATIENT
Start: 2022-02-23 | End: 2022-02-25 | Stop reason: SDUPTHER

## 2022-02-24 DIAGNOSIS — E11.9 TYPE 2 DIABETES MELLITUS WITHOUT COMPLICATION, WITHOUT LONG-TERM CURRENT USE OF INSULIN (HCC): ICD-10-CM

## 2022-02-24 NOTE — TELEPHONE ENCOUNTER
Pharmacy called stating the Rx needs to be sent electronically. For insurance purposes.     Last seen 6/28/2021  Next appt Visit date not found

## 2022-02-25 RX ORDER — BLOOD SUGAR DIAGNOSTIC
STRIP MISCELLANEOUS
Qty: 100 EACH | Refills: 3 | Status: SHIPPED | OUTPATIENT
Start: 2022-02-25

## 2022-02-28 DIAGNOSIS — E11.9 TYPE 2 DIABETES MELLITUS WITHOUT COMPLICATION, WITHOUT LONG-TERM CURRENT USE OF INSULIN (HCC): ICD-10-CM

## 2022-02-28 RX ORDER — BLOOD SUGAR DIAGNOSTIC
STRIP MISCELLANEOUS
Qty: 100 EACH | Refills: 3 | Status: CANCELLED | OUTPATIENT
Start: 2022-02-28

## 2022-02-28 NOTE — TELEPHONE ENCOUNTER
Pharmacy called Rx must be sent in due to insurance. Can not be called in.   Please advise     Last seen 6/28/2021  Next appt Visit date not found    GILBERT/Nelly

## 2022-03-22 ENCOUNTER — OFFICE VISIT (OUTPATIENT)
Dept: PODIATRY | Age: 72
End: 2022-03-22
Payer: MEDICARE

## 2022-03-22 VITALS — BODY MASS INDEX: 35.42 KG/M2 | WEIGHT: 253 LBS | HEIGHT: 71 IN

## 2022-03-22 DIAGNOSIS — R26.2 DIFFICULTY WALKING: ICD-10-CM

## 2022-03-22 DIAGNOSIS — E11.9 TYPE 2 DIABETES MELLITUS WITHOUT COMPLICATION, WITHOUT LONG-TERM CURRENT USE OF INSULIN (HCC): ICD-10-CM

## 2022-03-22 DIAGNOSIS — M72.2 PLANTAR FASCIITIS: Primary | ICD-10-CM

## 2022-03-22 DIAGNOSIS — M79.671 PAIN OF RIGHT FOOT: ICD-10-CM

## 2022-03-22 PROCEDURE — 3046F HEMOGLOBIN A1C LEVEL >9.0%: CPT | Performed by: PODIATRIST

## 2022-03-22 PROCEDURE — 99213 OFFICE O/P EST LOW 20 MIN: CPT | Performed by: PODIATRIST

## 2022-03-22 PROCEDURE — 1123F ACP DISCUSS/DSCN MKR DOCD: CPT | Performed by: PODIATRIST

## 2022-03-22 PROCEDURE — G8417 CALC BMI ABV UP PARAM F/U: HCPCS | Performed by: PODIATRIST

## 2022-03-22 PROCEDURE — 1036F TOBACCO NON-USER: CPT | Performed by: PODIATRIST

## 2022-03-22 PROCEDURE — 4040F PNEUMOC VAC/ADMIN/RCVD: CPT | Performed by: PODIATRIST

## 2022-03-22 PROCEDURE — 3017F COLORECTAL CA SCREEN DOC REV: CPT | Performed by: PODIATRIST

## 2022-03-22 PROCEDURE — G8484 FLU IMMUNIZE NO ADMIN: HCPCS | Performed by: PODIATRIST

## 2022-03-22 PROCEDURE — 2022F DILAT RTA XM EVC RTNOPTHY: CPT | Performed by: PODIATRIST

## 2022-03-22 PROCEDURE — G8427 DOCREV CUR MEDS BY ELIG CLIN: HCPCS | Performed by: PODIATRIST

## 2022-03-22 NOTE — PROGRESS NOTES
Patient is in today to discuss shockwave surgery to right foot. Patient has no other concerns at this time. pcp is Darryn Garcia MD  Last ov 6/28/21

## 2022-03-23 ENCOUNTER — TELEPHONE (OUTPATIENT)
Dept: PODIATRY | Age: 72
End: 2022-03-23

## 2022-03-23 NOTE — PROGRESS NOTES
3/22/22     Verneice Conquest    : 1950   Sex: male    Age: 80 y.o. Patient's PCP/Provider is:  Ezio Huang. MD Jose    Subjective:  Patient is seen today for follow-up regarding continued care regarding chronic plantar fasciitis right lower extremity. Patient presents today to discuss outpatient shockwave therapy regarding the plantar fascial issues right foot. Patient has worn his good supportive shoe gear and insoles as previously instructed. No other additional abnormalities noted. Chief Complaint   Patient presents with    Follow-up     discuss surgery        ROS:  Const: Positives and pertinent negatives as per HPI. Musculo: Denies symptoms other than stated above. Neuro: Denies symptoms other than stated above. Skin: Denies symptoms other than stated above. Current Medications:    Current Outpatient Medications:     Handicap Placard MISC, by Does not apply route Unable to ambulate more than 40 feet without difficulty Expires 2027, Disp: 1 each, Rfl: 0    blood glucose test strips (ONETOUCH VERIO) strip, USE TO CHECK BLOOD SUGAR DAILY.   SWITCH TO WHATEVER BRAND IS COVERED BY INSURANCE, Disp: 100 each, Rfl: 3    amLODIPine (NORVASC) 10 MG tablet, TAKE ONE TABLET BY MOUTH DAILY, Disp: 90 tablet, Rfl: 0    atorvastatin (LIPITOR) 20 MG tablet, Sig one daily, Disp: 90 tablet, Rfl: 0    FLUoxetine (PROZAC) 40 MG capsule, TAKE ONE CAPSULE BY MOUTH DAILY, Disp: 90 capsule, Rfl: 0    metFORMIN (GLUCOPHAGE-XR) 500 MG extended release tablet, TAKE TWO TABLETS BY MOUTH EVERY DAY WITH BREAKFAST, Disp: 60 tablet, Rfl: 1    meloxicam (MOBIC) 7.5 MG tablet, Take 1 tablet by mouth daily as needed for Pain, Disp: 30 tablet, Rfl: 0    donepezil (ARICEPT) 10 MG tablet, Take 1 tablet by mouth nightly, Disp: 90 tablet, Rfl: 3    Lancets 30G MISC, 1 each by Does not apply route daily Pt uses Clear Choice Glucose Meter., Disp: 100 each, Rfl: 0    glucose monitoring kit (FREESTYLE) monitoring kit, 1 kit by Does not apply route daily Check sugars once a day, Disp: 1 kit, Rfl: 0    hydrocortisone 2.5 % ointment, APPLY TOPICALLY 2 TIMES DAILY, Disp: 454 g, Rfl: 0    Blood Pressure Monitor LARY, Use daily to check blood pressure, Disp: 1 Device, Rfl: 0    Glucose Blood (GLUCOSE METER TEST VI), 1 each by In Vitro route daily, Disp: , Rfl:     Calcium Citrate-Vitamin D (CALCET CREAMY BITES) 500-400 MG-UNIT CHEW tablet, Take 1 tablet by mouth daily, Disp: , Rfl:     vitamin D (CHOLECALCIFEROL) 1000 UNIT TABS tablet, Take 1,000 Units by mouth daily, Disp: , Rfl:     vitamin B-12 (CYANOCOBALAMIN) 50 MCG tablet, Take 50 mcg by mouth daily, Disp: , Rfl:     ferrous gluconate (FERGON) 240 (27 Fe) MG tablet, Take 240 mg by mouth 3 times daily (with meals), Disp: , Rfl:     aspirin 81 MG EC tablet, Take 81 mg by mouth daily Stopped 1 week pre op, Disp: , Rfl:     lisinopril (PRINIVIL;ZESTRIL) 5 MG tablet, Take 0.5 tablets by mouth daily, Disp: 45 tablet, Rfl: 1    Allergies:  No Known Allergies    Vitals:    03/22/22 1330   Weight: 253 lb (114.8 kg)   Height: 5' 11\" (1.803 m)       Exam:  Neurovascular status unchanged. Tenderness noted palpation into the plantar right heel region. No edema or ecchymotic skin changes present right foot. No paresthesias noted upon percussion tarsal tunnel region right foot. Mild antalgic gait noted right lower extremity. Diagnostic Studies:     No results found. Procedures:    None    Plan Per Assessment  Lucas Shone was seen today for follow-up. Diagnoses and all orders for this visit:    Plantar fasciitis    Pain of right foot    Type 2 diabetes mellitus without complication, without long-term current use of insulin (HCC)    Difficulty walking      1. Evaluation and management  2. We did discuss outpatient surgical intervention which would include shockwave therapy right lower extremity.   3. The reason for surgery is due to failed conservative treatment and/or conservative treatment is not a viable option. It was discussed with the patient that compliance postoperatively is of utmost importance. Any deviation on behalf of the patient will decrease the chances of a successful outcome. The risks of surgery were discussed in detail with the patient. They include but are not limited to: Infection, failure, prolonged pain, swelling, numbness, recurrence, limited mobility, painful scar, reflex sympathetic dystrophy, over correction, under correction, and loss of limb/life. It was also discussed in detail that no guarantees could be made in regards to a good cosmetic result. The patient understands all of the potential complications. All questions were thoroughly answered and the patient consented to proceed with the proposed surgery. Consent is located in the patient record. The patient was counseled at length about the risks of erasto Covid-19 during their perioperative period and any recovery window from their procedure. The patient was made aware that erasto Covid-19  may worsen their prognosis for recovering from their procedure  and lend to a higher morbidity and/or mortality risk. All material risks, benefits, and reasonable alternatives including postponing the procedure were discussed. The patient does wish to proceed with the procedure at this time. 4. Patient will be notified of the exact date and time procedure is scheduled once necessary insurance verification is performed. All evaluations, labs, testing will be performed prior to proceeding with the procedure. He was advised to call the office with any questions or concerns in the interim. Seen By:    Gabi Gibbs DPM    Electronically signed by Gabi Gibbs DPM on 3/22/2022 at 8:45 PM    This note was created using voice recognition software. The note was reviewed however may contain grammatical errors.

## 2022-03-23 NOTE — TELEPHONE ENCOUNTER
Prior Authorization Form:      DEMOGRAPHICS:                     Patient Name:  Kristopher Rogers  Patient :  1950            Insurance:  Payor: MEDICARE / Plan: MEDICARE PART A AND B / Product Type: *No Product type* /   Insurance ID Number:    Payor/Plan Subscr  Sex Relation Sub. Ins. ID Effective Group Num   1. [address] [facility] 1950 Male Self [id number]                                    [address] [address]   2.  [address] 1950 Male Self [id number] plan n                                   [address]         DIAGNOSIS & PROCEDURE:                       Procedure/Operation: High energy extracorporeal shockwave therapy right foot           CPT Code: 43915    Diagnosis:  Plantar fasciitis right foot, pain in right foot    ICD10 Code: M72.2, M79.671    Location:  [facility]    Surgeon:  Dr. Arely Rodriguez INFORMATION:                          Date: 22    Time: 1400              Anesthesia:  MAC/TIVA                                                       Status:  Outpatient        Special Comments:         Electronically signed by Delfina Romero LPN on 4750 at 96:56 AM

## 2022-04-06 ENCOUNTER — OFFICE VISIT (OUTPATIENT)
Dept: FAMILY MEDICINE CLINIC | Age: 72
End: 2022-04-06
Payer: MEDICARE

## 2022-04-06 VITALS
RESPIRATION RATE: 18 BRPM | TEMPERATURE: 96.8 F | HEART RATE: 76 BPM | BODY MASS INDEX: 35.42 KG/M2 | HEIGHT: 71 IN | DIASTOLIC BLOOD PRESSURE: 78 MMHG | WEIGHT: 253 LBS | SYSTOLIC BLOOD PRESSURE: 128 MMHG | OXYGEN SATURATION: 95 %

## 2022-04-06 DIAGNOSIS — M72.2 PLANTAR FASCIITIS: ICD-10-CM

## 2022-04-06 DIAGNOSIS — E11.51 TYPE II DIABETES MELLITUS WITH PERIPHERAL CIRCULATORY DISORDER (HCC): ICD-10-CM

## 2022-04-06 DIAGNOSIS — I10 ESSENTIAL HYPERTENSION: ICD-10-CM

## 2022-04-06 DIAGNOSIS — E55.9 VITAMIN D DEFICIENCY: ICD-10-CM

## 2022-04-06 DIAGNOSIS — M79.671 RIGHT FOOT PAIN: ICD-10-CM

## 2022-04-06 DIAGNOSIS — E11.9 TYPE 2 DIABETES MELLITUS WITHOUT COMPLICATION, WITHOUT LONG-TERM CURRENT USE OF INSULIN (HCC): ICD-10-CM

## 2022-04-06 DIAGNOSIS — Z12.5 PROSTATE CANCER SCREENING: ICD-10-CM

## 2022-04-06 DIAGNOSIS — G47.19 EXCESSIVE DAYTIME SLEEPINESS: ICD-10-CM

## 2022-04-06 DIAGNOSIS — G47.33 OSA (OBSTRUCTIVE SLEEP APNEA): ICD-10-CM

## 2022-04-06 DIAGNOSIS — Q89.01 ASPLENIA: ICD-10-CM

## 2022-04-06 DIAGNOSIS — Z01.818 PREOPERATIVE EXAMINATION: Primary | ICD-10-CM

## 2022-04-06 DIAGNOSIS — Z98.84 HISTORY OF GASTRIC BYPASS: ICD-10-CM

## 2022-04-06 DIAGNOSIS — E66.01 SEVERE OBESITY (BMI 35.0-39.9) WITH COMORBIDITY (HCC): ICD-10-CM

## 2022-04-06 PROCEDURE — 99213 OFFICE O/P EST LOW 20 MIN: CPT | Performed by: FAMILY MEDICINE

## 2022-04-06 PROCEDURE — 3017F COLORECTAL CA SCREEN DOC REV: CPT | Performed by: FAMILY MEDICINE

## 2022-04-06 PROCEDURE — G8417 CALC BMI ABV UP PARAM F/U: HCPCS | Performed by: FAMILY MEDICINE

## 2022-04-06 PROCEDURE — 4040F PNEUMOC VAC/ADMIN/RCVD: CPT | Performed by: FAMILY MEDICINE

## 2022-04-06 PROCEDURE — 90471 IMMUNIZATION ADMIN: CPT | Performed by: FAMILY MEDICINE

## 2022-04-06 PROCEDURE — 3052F HG A1C>EQUAL 8.0%<EQUAL 9.0%: CPT | Performed by: FAMILY MEDICINE

## 2022-04-06 PROCEDURE — 1036F TOBACCO NON-USER: CPT | Performed by: FAMILY MEDICINE

## 2022-04-06 PROCEDURE — 90734 MENACWYD/MENACWYCRM VACC IM: CPT | Performed by: FAMILY MEDICINE

## 2022-04-06 PROCEDURE — G8427 DOCREV CUR MEDS BY ELIG CLIN: HCPCS | Performed by: FAMILY MEDICINE

## 2022-04-06 PROCEDURE — 1123F ACP DISCUSS/DSCN MKR DOCD: CPT | Performed by: FAMILY MEDICINE

## 2022-04-06 PROCEDURE — 2022F DILAT RTA XM EVC RTNOPTHY: CPT | Performed by: FAMILY MEDICINE

## 2022-04-06 RX ORDER — ASPIRIN 81 MG/1
81 TABLET ORAL DAILY
Qty: 90 TABLET | Refills: 3 | Status: SHIPPED | OUTPATIENT
Start: 2022-04-06

## 2022-04-06 RX ORDER — DONEPEZIL HYDROCHLORIDE 10 MG/1
10 TABLET, FILM COATED ORAL NIGHTLY
Qty: 90 TABLET | Refills: 3 | Status: SHIPPED | OUTPATIENT
Start: 2022-04-06

## 2022-04-06 RX ORDER — FLUOXETINE HYDROCHLORIDE 40 MG/1
CAPSULE ORAL
Qty: 90 CAPSULE | Refills: 3 | Status: SHIPPED | OUTPATIENT
Start: 2022-04-06

## 2022-04-06 RX ORDER — ZINC GLUCONATE 50 MG
50 TABLET ORAL DAILY
COMMUNITY

## 2022-04-06 RX ORDER — AMLODIPINE BESYLATE 10 MG/1
TABLET ORAL
Qty: 90 TABLET | Refills: 3 | Status: SHIPPED | OUTPATIENT
Start: 2022-04-06

## 2022-04-06 RX ORDER — MELOXICAM 7.5 MG/1
7.5 TABLET ORAL DAILY PRN
Qty: 30 TABLET | Refills: 0 | Status: CANCELLED | OUTPATIENT
Start: 2022-04-06

## 2022-04-06 RX ORDER — ATORVASTATIN CALCIUM 20 MG/1
TABLET, FILM COATED ORAL
Qty: 90 TABLET | Refills: 3 | Status: SHIPPED | OUTPATIENT
Start: 2022-04-06

## 2022-04-06 ASSESSMENT — PATIENT HEALTH QUESTIONNAIRE - PHQ9
SUM OF ALL RESPONSES TO PHQ QUESTIONS 1-9: 4
2. FEELING DOWN, DEPRESSED OR HOPELESS: 0
SUM OF ALL RESPONSES TO PHQ QUESTIONS 1-9: 4
SUM OF ALL RESPONSES TO PHQ QUESTIONS 1-9: 4
1. LITTLE INTEREST OR PLEASURE IN DOING THINGS: 1
7. TROUBLE CONCENTRATING ON THINGS, SUCH AS READING THE NEWSPAPER OR WATCHING TELEVISION: 0
9. THOUGHTS THAT YOU WOULD BE BETTER OFF DEAD, OR OF HURTING YOURSELF: 0
3. TROUBLE FALLING OR STAYING ASLEEP: 1
SUM OF ALL RESPONSES TO PHQ9 QUESTIONS 1 & 2: 1
4. FEELING TIRED OR HAVING LITTLE ENERGY: 2
SUM OF ALL RESPONSES TO PHQ QUESTIONS 1-9: 4
10. IF YOU CHECKED OFF ANY PROBLEMS, HOW DIFFICULT HAVE THESE PROBLEMS MADE IT FOR YOU TO DO YOUR WORK, TAKE CARE OF THINGS AT HOME, OR GET ALONG WITH OTHER PEOPLE: 0
6. FEELING BAD ABOUT YOURSELF - OR THAT YOU ARE A FAILURE OR HAVE LET YOURSELF OR YOUR FAMILY DOWN: 0
5. POOR APPETITE OR OVEREATING: 0
8. MOVING OR SPEAKING SO SLOWLY THAT OTHER PEOPLE COULD HAVE NOTICED. OR THE OPPOSITE, BEING SO FIGETY OR RESTLESS THAT YOU HAVE BEEN MOVING AROUND A LOT MORE THAN USUAL: 0

## 2022-04-06 ASSESSMENT — ENCOUNTER SYMPTOMS
WHEEZING: 0
SHORTNESS OF BREATH: 0
COUGH: 0
VOMITING: 0
NAUSEA: 0
ABDOMINAL PAIN: 0

## 2022-04-06 NOTE — PROGRESS NOTES
[address] [facility]  [phone]   Desean Partida MD     Patient: Desean Phipps  YOB: 1950  Visit Date: 4/6/22    Honey Win is a 80y.o. year old male here today for   Chief Complaint   Patient presents with    Surgical Clearance     medical clearance       HPI  Patient is a 80year old male with dementia, diabetes, htn, hld , depression here for preop prior to shockwave right foot. Has never had any issues with anesthesia. Saw dentist and foot doctor   Continue to have right heel pain and will be having shockwave therapy until light sedation. No chest pain, sob    Lightheadedness at baseline. No fall. Sleeping more than usual. 12 hours a day. Stays up late watching news. Sleeps until noon or 1 . Naps during the day too. Has not been using the cpap machine. At least a month and a half. Diabetes- has been to the eye doctor - Wiliam Horan. Did not have . Getting close to 200 . Has been taking 2 tablet once a day of metformin     Has appointment with new pcp  May 2       Review of Systems   Constitutional: Negative for chills and fever. Respiratory: Negative for cough, shortness of breath and wheezing. Cardiovascular: Negative for chest pain, palpitations and leg swelling. Gastrointestinal: Negative for abdominal pain, nausea and vomiting. Neurological: Positive for dizziness and headaches. Current Outpatient Medications on File Prior to Visit   Medication Sig Dispense Refill    zinc gluconate 50 MG tablet Take 50 mg by mouth daily      Handicap Placard MISC by Does not apply route Unable to ambulate more than 40 feet without difficulty  Expires 2/28/2027 1 each 0    blood glucose test strips (ONETOUCH VERIO) strip USE TO CHECK BLOOD SUGAR DAILY.     SWITCH TO WHATEVER BRAND IS COVERED BY INSURANCE 100 each 3    metFORMIN (GLUCOPHAGE-XR) 500 MG extended release tablet TAKE TWO TABLETS BY MOUTH EVERY DAY WITH BREAKFAST 60 tablet 1    lisinopril (PRINIVIL;ZESTRIL) 5 MG tablet Take 0.5 tablets by mouth daily 45 tablet 1    Lancets 30G MISC 1 each by Does not apply route daily Pt uses Clear Choice Glucose Meter. 100 each 0    glucose monitoring kit (FREESTYLE) monitoring kit 1 kit by Does not apply route daily Check sugars once a day 1 kit 0    Blood Pressure Monitor LARY Use daily to check blood pressure 1 Device 0    Glucose Blood (GLUCOSE METER TEST VI) 1 each by In Vitro route daily      Calcium Citrate-Vitamin D (CALCET CREAMY BITES) 500-400 MG-UNIT CHEW tablet Take 1 tablet by mouth daily      vitamin D (CHOLECALCIFEROL) 1000 UNIT TABS tablet Take 1,000 Units by mouth daily      vitamin B-12 (CYANOCOBALAMIN) 50 MCG tablet Take 50 mcg by mouth daily      ferrous gluconate (FERGON) 240 (27 Fe) MG tablet Take 240 mg by mouth 3 times daily (with meals)       No current facility-administered medications on file prior to visit. No Known Allergies    Past medical, surgical, socialand/or family history reviewed, updated as needed, and are non-contributory (unless otherwise stated). Medications, allergies, and problem list also reviewed and updated as needed in patient's record. Wt Readings from Last 3 Encounters:   04/06/22 253 lb (114.8 kg)   03/22/22 253 lb (114.8 kg)   02/08/22 253 lb (114.8 kg)                   /78   Pulse 76   Temp 96.8 °F (36 °C)   Resp 18   Ht 5' 11\" (1.803 m)   Wt 253 lb (114.8 kg)   SpO2 95%   BMI 35.29 kg/m²        Physical Exam  Vitals and nursing note reviewed. Constitutional:       General: He is not in acute distress. Appearance: He is well-developed. He is not diaphoretic. HENT:      Head: Normocephalic and atraumatic. Cardiovascular:      Rate and Rhythm: Normal rate and regular rhythm. Heart sounds: Normal heart sounds. No murmur heard. Pulmonary:      Effort: Pulmonary effort is normal. No respiratory distress.       Breath sounds: Normal breath sounds. No wheezing or rales. Abdominal:      General: Bowel sounds are normal. There is no distension. Tenderness: There is no abdominal tenderness. There is no guarding or rebound. Musculoskeletal:         General: Swelling (trace) present. Normal range of motion. Results for orders placed or performed in visit on 06/28/21   POCT glycosylated hemoglobin (Hb A1C)   Result Value Ref Range    Hemoglobin A1C 7.9 %       ASSESSMENT/PLAN  Duane Busby was seen today for surgical clearance. Diagnoses and all orders for this visit:    Preoperative examination   - Low risk for low risk procedure. EKG not indicated. - No further testing required prior to procedure. Can complete >4 METS   4/20 addendum - EKG done on 4/18 reviewed. EKG largely unchanged from 2/21. No acute ST or T waves changes. Patient with no chest pain. No further testing required prior to proposed low risk procedure. Right foot pain   - Having shockwave treatment. Plantar fasciitis   - See above , per podiatry     Essential hypertension  -     amLODIPine (NORVASC) 10 MG tablet; TAKE ONE TABLET BY MOUTH DAILY  - Controlled on current meds. Type 2 diabetes mellitus without complication, without long-term current use of insulin (HCC)  -     atorvastatin (LIPITOR) 20 MG tablet; Sig one daily  -     Comprehensive Metabolic Panel; Future  -     CBC with Auto Differential; Future  -     Hemoglobin A1C; Future  -     PSA Screening; Future  -     LIPID PANEL; Future  -     Microalbumin / Creatinine Urine Ratio; Future    Severe obesity (BMI 35.0-39. 9) with comorbidity ([facility] [address]    Prostate cancer screening  -     PSA Screening; Future    Type II diabetes mellitus with peripheral circulatory disorder (HCC)   - Check labs    - Advised to have repeat eye exam to assess for diabetic retinopathy with dilated eye exam.    - Continue metformin for now. Vitamin D deficiency  -     Vitamin D 25 Hydroxy;  Future    Asplenia  - Meningococcal MCV4O (age 1m-47y) IM (MENVEO)    Excessive daytime sleepiness   - Most likely 2/2 patient not using his CPAP at night. Advised to restart CPAP and reassess. GILDARDO (obstructive sleep apnea)   - See above  Excessive daytime sleepiness. Other orders  -     donepezil (ARICEPT) 10 MG tablet; Take 1 tablet by mouth nightly  -     FLUoxetine (PROZAC) 40 MG capsule; TAKE ONE CAPSULE BY MOUTH DAILY  -     hydrocortisone 2.5 % ointment; APPLY TOPICALLY 2 TIMES DAILY  -     aspirin 81 MG EC tablet; Take 1 tablet by mouth daily Stopped 1 week pre op  -     VITAMIN B1; Future  -     Cancel: ZINC; Future  -     Phosphorus; Future  -     Magnesium; Future  -     Vitamin B12 & Folate; Future            Phone/MyChart follow up if tests abnormal.    Return in about 4 weeks (around 5/4/2022). or sooner if necessary. I have reviewed myfindings and recommendations with Olga Morgan. Jerry Vale.  Austin Buenrostro MD, M.D

## 2022-04-07 DIAGNOSIS — Z98.84 HISTORY OF GASTRIC BYPASS: ICD-10-CM

## 2022-04-07 DIAGNOSIS — Z12.5 PROSTATE CANCER SCREENING: ICD-10-CM

## 2022-04-07 DIAGNOSIS — E55.9 VITAMIN D DEFICIENCY: ICD-10-CM

## 2022-04-07 DIAGNOSIS — E11.9 TYPE 2 DIABETES MELLITUS WITHOUT COMPLICATION, WITHOUT LONG-TERM CURRENT USE OF INSULIN (HCC): ICD-10-CM

## 2022-04-07 LAB
ALBUMIN SERPL-MCNC: 4.2 G/DL (ref 3.5–5.2)
ALP BLD-CCNC: 145 U/L (ref 40–129)
ALT SERPL-CCNC: 22 U/L (ref 0–40)
ANION GAP SERPL CALCULATED.3IONS-SCNC: 14 MMOL/L (ref 7–16)
ANISOCYTOSIS: ABNORMAL
AST SERPL-CCNC: 18 U/L (ref 0–39)
BASOPHILS ABSOLUTE: 0.25 E9/L (ref 0–0.2)
BASOPHILS RELATIVE PERCENT: 2.6 % (ref 0–2)
BILIRUB SERPL-MCNC: 0.3 MG/DL (ref 0–1.2)
BUN BLDV-MCNC: 10 MG/DL (ref 6–23)
BURR CELLS: ABNORMAL
CALCIUM SERPL-MCNC: 9.1 MG/DL (ref 8.6–10.2)
CHLORIDE BLD-SCNC: 103 MMOL/L (ref 98–107)
CHOLESTEROL, TOTAL: 162 MG/DL (ref 0–199)
CO2: 23 MMOL/L (ref 22–29)
CREAT SERPL-MCNC: 0.9 MG/DL (ref 0.7–1.2)
CREATININE URINE: 164 MG/DL (ref 40–278)
EOSINOPHILS ABSOLUTE: 0.43 E9/L (ref 0.05–0.5)
EOSINOPHILS RELATIVE PERCENT: 4.4 % (ref 0–6)
FOLATE: 11.4 NG/ML (ref 4.8–24.2)
GFR AFRICAN AMERICAN: >60
GFR NON-AFRICAN AMERICAN: >60 ML/MIN/1.73
GLUCOSE BLD-MCNC: 180 MG/DL (ref 74–99)
HBA1C MFR BLD: 8.8 % (ref 4–5.6)
HCT VFR BLD CALC: 42.5 % (ref 37–54)
HDLC SERPL-MCNC: 42 MG/DL
HEMOGLOBIN: 13.7 G/DL (ref 12.5–16.5)
LDL CHOLESTEROL CALCULATED: 81 MG/DL (ref 0–99)
LYMPHOCYTES ABSOLUTE: 2.33 E9/L (ref 1.5–4)
LYMPHOCYTES RELATIVE PERCENT: 24.3 % (ref 20–42)
MAGNESIUM: 2.2 MG/DL (ref 1.6–2.6)
MCH RBC QN AUTO: 30.6 PG (ref 26–35)
MCHC RBC AUTO-ENTMCNC: 32.2 % (ref 32–34.5)
MCV RBC AUTO: 95.1 FL (ref 80–99.9)
MICROALBUMIN UR-MCNC: 13.5 MG/L
MICROALBUMIN/CREAT UR-RTO: 8.2 (ref 0–30)
MONOCYTES ABSOLUTE: 0.78 E9/L (ref 0.1–0.95)
MONOCYTES RELATIVE PERCENT: 7.8 % (ref 2–12)
NEUTROPHILS ABSOLUTE: 5.92 E9/L (ref 1.8–7.3)
NEUTROPHILS RELATIVE PERCENT: 60.9 % (ref 43–80)
OVALOCYTES: ABNORMAL
PDW BLD-RTO: 15.3 FL (ref 11.5–15)
PHOSPHORUS: 3.8 MG/DL (ref 2.5–4.5)
PLATELET # BLD: 238 E9/L (ref 130–450)
PMV BLD AUTO: 13.6 FL (ref 7–12)
POIKILOCYTES: ABNORMAL
POLYCHROMASIA: ABNORMAL
POTASSIUM SERPL-SCNC: 4.1 MMOL/L (ref 3.5–5)
PROSTATE SPECIFIC ANTIGEN: 2.36 NG/ML (ref 0–4)
RBC # BLD: 4.47 E12/L (ref 3.8–5.8)
SODIUM BLD-SCNC: 140 MMOL/L (ref 132–146)
TOTAL PROTEIN: 7.2 G/DL (ref 6.4–8.3)
TRIGL SERPL-MCNC: 196 MG/DL (ref 0–149)
VITAMIN B-12: >2000 PG/ML (ref 211–946)
VITAMIN D 25-HYDROXY: 36 NG/ML (ref 30–100)
VLDLC SERPL CALC-MCNC: 39 MG/DL
WBC # BLD: 9.7 E9/L (ref 4.5–11.5)

## 2022-04-12 ENCOUNTER — TELEPHONE (OUTPATIENT)
Dept: FAMILY MEDICINE CLINIC | Age: 72
End: 2022-04-12

## 2022-04-12 DIAGNOSIS — E11.9 TYPE 2 DIABETES MELLITUS WITHOUT COMPLICATION, WITHOUT LONG-TERM CURRENT USE OF INSULIN (HCC): ICD-10-CM

## 2022-04-12 RX ORDER — METFORMIN HYDROCHLORIDE 500 MG/1
TABLET, EXTENDED RELEASE ORAL
Qty: 120 TABLET | Refills: 1 | Status: SHIPPED | OUTPATIENT
Start: 2022-04-12

## 2022-04-14 LAB — VITAMIN B1 WHOLE BLOOD: 153 NMOL/L (ref 70–180)

## 2022-04-18 ENCOUNTER — HOSPITAL ENCOUNTER (OUTPATIENT)
Age: 72
Discharge: HOME | End: 2022-04-18
Payer: MEDICARE

## 2022-04-18 PROCEDURE — 93005 ELECTROCARDIOGRAM TRACING: CPT

## 2022-04-19 LAB
EKG ATRIAL RATE: 64 BPM
EKG P AXIS: 62 DEGREES
EKG P-R INTERVAL: 192 MS
EKG Q-T INTERVAL: 418 MS
EKG QRS DURATION: 84 MS
EKG QTC CALCULATION (BAZETT): 431 MS
EKG R AXIS: 38 DEGREES
EKG T AXIS: 52 DEGREES
EKG VENTRICULAR RATE: 64 BPM

## 2022-04-20 ENCOUNTER — ANESTHESIA EVENT (OUTPATIENT)
Dept: OPERATING ROOM | Age: 72
End: 2022-04-20
Payer: MEDICARE

## 2022-04-20 ENCOUNTER — PREP FOR PROCEDURE (OUTPATIENT)
Dept: PODIATRY | Age: 72
End: 2022-04-20

## 2022-04-20 RX ORDER — SODIUM CHLORIDE 9 MG/ML
INJECTION, SOLUTION INTRAVENOUS PRN
Status: CANCELLED | OUTPATIENT
Start: 2022-04-20

## 2022-04-20 RX ORDER — SODIUM CHLORIDE 0.9 % (FLUSH) 0.9 %
5-40 SYRINGE (ML) INJECTION PRN
Status: CANCELLED | OUTPATIENT
Start: 2022-04-20

## 2022-04-20 RX ORDER — SODIUM CHLORIDE 0.9 % (FLUSH) 0.9 %
5-40 SYRINGE (ML) INJECTION EVERY 12 HOURS SCHEDULED
Status: CANCELLED | OUTPATIENT
Start: 2022-04-20

## 2022-04-20 ASSESSMENT — ENCOUNTER SYMPTOMS: SHORTNESS OF BREATH: 1

## 2022-04-20 NOTE — PROGRESS NOTES
Message sent through [facility] to review abnormal EKG,  Pt having surgery tomorrow, 4/21/2022 with Dr. Avalos.  Request for addendum to current medical clearance from Dr. Janet Chambers.   bjp

## 2022-04-20 NOTE — ANESTHESIA PRE PROCEDURE
Department of Anesthesiology  Preprocedure Note       Name:  Jose Ramon Ash   Age:  80 y.o.  :  1950                                          MRN:  [id number]         Date:  2022      Surgeon: Heriberto Camarillo):  Garry Hoyos DPM    Procedure: Procedure(s):  HIGH ENERGY EXTRACORPEAL SHOCKWAVE THERAPY RIGHT FOOT    Medications prior to admission:   Prior to Admission medications    Medication Sig Start Date End Date Taking? Authorizing Provider   metFORMIN (GLUCOPHAGE-XR) 500 MG extended release tablet TAKE TWO TABLETS BY MOUTH twice daily  Patient taking differently: 1,000 mg TAKE TWO TABLETS BY MOUTH twice daily, increased to 4 tab daily 22   Yoselin Alicea MD   amLODIPine (NORVASC) 10 MG tablet TAKE ONE TABLET BY MOUTH DAILY 22   Yoselin Alicea MD   atorvastatin (LIPITOR) 20 MG tablet Sig one daily 22   Yoselin Alicea MD   zinc gluconate 50 MG tablet Take 50 mg by mouth daily    Historical Provider, MD   donepezil (ARICEPT) 10 MG tablet Take 1 tablet by mouth nightly 22   Yoselin Alicea MD   FLUoxetine (PROZAC) 40 MG capsule TAKE ONE CAPSULE BY MOUTH DAILY 22   Yoselin Alicea MD   aspirin 81 MG EC tablet Take 1 tablet by mouth daily Stopped 1 week pre op 22   Yoselin Alicea MD   Handicap Placard MISC by Does not apply route Unable to ambulate more than 40 feet without difficulty  Expires 2027   Yoselin Alicea MD   blood glucose test strips (ONETOUCH VERIO) strip USE TO CHECK BLOOD SUGAR DAILY. SWITCH TO WHATEVER BRAND IS COVERED BY INSURANCE 22   Yoselin Alicea MD   lisinopril (PRINIVIL;ZESTRIL) 5 MG tablet Take 0.5 tablets by mouth daily 21  Yoselin Alicea MD   Lancets 30G MISC 1 each by Does not apply route daily Pt uses Clear Choice Glucose Meter.  20   Yoselin Alicea MD   glucose monitoring kit (FREESTYLE) monitoring kit 1 kit by Does not apply route daily Check sugars once a day 20   Alejandra [employer], MD   Blood Pressure Monitor LARY Use daily to check blood pressure 4/26/18   Leslie Mcgregor MD   Glucose Blood (GLUCOSE METER TEST VI) 1 each by In Vitro route daily    Historical Provider, MD   Calcium Citrate-Vitamin D (CALCET CREAMY BITES) 500-400 MG-UNIT CHEW tablet Take 1 tablet by mouth daily    Historical Provider, MD   vitamin D (CHOLECALCIFEROL) 1000 UNIT TABS tablet Take 1,000 Units by mouth daily    Historical Provider, MD   vitamin B-12 (CYANOCOBALAMIN) 50 MCG tablet Take 50 mcg by mouth daily    Historical Provider, MD   ferrous gluconate (FERGON) 240 (27 Fe) MG tablet Take 240 mg by mouth 3 times daily (with meals)    Historical Provider, MD       Current medications:    No current facility-administered medications for this encounter. Current Outpatient Medications   Medication Sig Dispense Refill    metFORMIN (GLUCOPHAGE-XR) 500 MG extended release tablet TAKE TWO TABLETS BY MOUTH twice daily (Patient taking differently: 1,000 mg TAKE TWO TABLETS BY MOUTH twice daily, increased to 4 tab daily) 120 tablet 1    amLODIPine (NORVASC) 10 MG tablet TAKE ONE TABLET BY MOUTH DAILY 90 tablet 3    atorvastatin (LIPITOR) 20 MG tablet Sig one daily 90 tablet 3    zinc gluconate 50 MG tablet Take 50 mg by mouth daily      donepezil (ARICEPT) 10 MG tablet Take 1 tablet by mouth nightly 90 tablet 3    FLUoxetine (PROZAC) 40 MG capsule TAKE ONE CAPSULE BY MOUTH DAILY 90 capsule 3    aspirin 81 MG EC tablet Take 1 tablet by mouth daily Stopped 1 week pre op 90 tablet 3    Handicap Placard MISC by Does not apply route Unable to ambulate more than 40 feet without difficulty  Expires 2/28/2027 1 each 0    blood glucose test strips (ONETOUCH VERIO) strip USE TO CHECK BLOOD SUGAR DAILY.     SWITCH TO WHATEVER BRAND IS COVERED BY INSURANCE 100 each 3    lisinopril (PRINIVIL;ZESTRIL) 5 MG tablet Take 0.5 tablets by mouth daily 45 tablet 1    Lancets 30G MISC 1 each by Does not apply route daily Pt uses Clear Choice Glucose Meter.  100 each 0    glucose monitoring kit (FREESTYLE) monitoring kit 1 kit by Does not apply route daily Check sugars once a day 1 kit 0    Blood Pressure Monitor LARY Use daily to check blood pressure 1 Device 0    Glucose Blood (GLUCOSE METER TEST VI) 1 each by In Vitro route daily      Calcium Citrate-Vitamin D (CALCET CREAMY BITES) 500-400 MG-UNIT CHEW tablet Take 1 tablet by mouth daily      vitamin D (CHOLECALCIFEROL) 1000 UNIT TABS tablet Take 1,000 Units by mouth daily      vitamin B-12 (CYANOCOBALAMIN) 50 MCG tablet Take 50 mcg by mouth daily      ferrous gluconate (FERGON) 240 (27 Fe) MG tablet Take 240 mg by mouth 3 times daily (with meals)         Allergies:  No Known Allergies    Problem List:    Patient Active Problem List   Diagnosis Code    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    Numbness and tingling R20.0, R20.2    Small vessel disease (HCC) I73.9    Cervical muscle pain M54.2    Hypercholesteremia E78.00    Carpal tunnel syndrome G56.00    Essential hypertension I10    Moderate episode of recurrent major depressive disorder ([facility]) F33.1    Memory loss R41.3    Cerebral ischemia I67.82    Cognitive dysfunction F09    Exertional dyspnea R06.00    Severe obesity (BMI 35.0-35.9 with comorbidity) ([facility]) E66.01, Z68.35    Venous insufficiency (chronic) (peripheral) I87.2    Dementia without behavioral disturbance ([facility]) F03.90    Type 2 diabetes mellitus without complication, without long-term current use of insulin ([facility]) E11.9    Type II diabetes mellitus with peripheral circulatory disorder ([facility]) E11.51    Localized edema R60.0    Right foot pain M79.671    Achilles tendinosis of right lower extremity M67.88    Difficulty walking R26.2    GILDARDO (obstructive sleep apnea) G47.33    Tendinitis of right foot M77.51    Pain in right ankle and joints of right foot M25.571    Plantar fasciitis M72.2    Pain of right foot M79.671       Past Medical History: Diagnosis Date    CAD (coronary artery disease)     Dementia ([facility] [address]     early stages  just short term memory loss currently    DM (diabetes mellitus) ([facility] [address]     Hx of cardiovascular stress test 2020    Hyperlipidemia     Hypertension     Moderate episode of recurrent major depressive disorder ([facility] [address] 2018    Myocardial infarction ([facility] [address] 1995    slight mild    Osteoarthritis     Sleep apnea     does have machine- uses occasionaly       Past Surgical History:        Procedure Laterality Date    ANTERIOR CRUCIATE LIGAMENT REPAIR  2002 left knee    COLONOSCOPY      HEEL SPUR SURGERY Right 2021    RESECTION EXOSTOSIS RIGHT FOOT, REPAIR ACHILLES TENDON RIGHT (CPT 89941) ([facility]) performed by Joseph Lechuga DPM at [address] [place] [address],[address]   right    right knee    OTHER SURGICAL HISTORY  2021    resection retrocalcaneal exostosia right foot, repair achilles tendon right foot    PENILE PROSTHESIS      CHET-EN-Y GASTRIC BYPASS  2004 Dr. Charlene Cruz  2004 Dr. Willie Patterson  12/15/2004 Dr. Lynn Cloud  2007 Laparoscopic with mesh, Dr Jennifer James       Social History:    Social History     Tobacco Use    Smoking status: Former Smoker     Packs/day: 0.50     Years: 5.00     Pack years: 2.50     Types: Cigarettes     Quit date: 1970     Years since quittin.3    Smokeless tobacco: Never Used   Substance Use Topics    Alcohol use: No     Comment: was an alcohol until .  Went to [place] [address] then had went to a doctor in [address]                                 Counseling given: Not Answered      Vital Signs (Current):   Vitals:    22 0922   Weight: 250 lb (113.4 kg)   Height: 5' 10\" (1.778 m)                                              BP Readings from Last 3 Encounters:   22 128/78   08/10/21 (!) 163/84   06/28/21 120/76       NPO Status:  >8.H                                                                               BMI:   Wt Readings from Last 3 Encounters:   04/06/22 253 lb (114.8 kg)   03/22/22 253 lb (114.8 kg)   02/08/22 253 lb (114.8 kg)     Body mass index is 35.87 kg/m². CBC:   Lab Results   Component Value Date    WBC 9.7 04/07/2022    RBC 4.47 04/07/2022    HGB 13.7 04/07/2022    HCT 42.5 04/07/2022    MCV 95.1 04/07/2022    RDW 15.3 04/07/2022     04/07/2022       CMP:   Lab Results   Component Value Date     04/07/2022    K 4.1 04/07/2022     04/07/2022    CO2 23 04/07/2022    BUN 10 04/07/2022    CREATININE 0.9 04/07/2022    GFRAA >60 04/07/2022    LABGLOM >60 04/07/2022    GLUCOSE 180 04/07/2022    GLUCOSE 87 04/11/2012    PROT 7.2 04/07/2022    CALCIUM 9.1 04/07/2022    BILITOT 0.3 04/07/2022    ALKPHOS 145 04/07/2022    AST 18 04/07/2022    ALT 22 04/07/2022       POC Tests: No results for input(s): POCGLU, POCNA, POCK, POCCL, POCBUN, POCHEMO, POCHCT in the last 72 hours.     Coags: No results found for: PROTIME, INR, APTT    HCG (If Applicable): No results found for: PREGTESTUR, PREGSERUM, HCG, HCGQUANT     ABGs: No results found for: PHART, PO2ART, UYC4DSW, DZG4MZJ, BEART, Q3HEMEUE     Type & Screen (If Applicable):  No results found for: LABABO, LABRH    Drug/Infectious Status (If Applicable):  No results found for: HIV, HEPCAB    COVID-19 Screening (If Applicable):   Lab Results   Component Value Date    COVID19 Not Detected 02/09/2021           Anesthesia Evaluation  Patient summary reviewed no history of anesthetic complications:   Airway: Mallampati: II  TM distance: >3 FB   Neck ROM: full  Comment: Fat neck   Dental:    (+) caps      Pulmonary: breath sounds clear to auscultation  (+) shortness of breath:  sleep apnea:      (-) not a current smoker                           Cardiovascular:  Exercise tolerance: good (>4 METS),   (+)

## 2022-04-21 ENCOUNTER — ANESTHESIA (OUTPATIENT)
Dept: OPERATING ROOM | Age: 72
End: 2022-04-21
Payer: MEDICARE

## 2022-04-21 ENCOUNTER — HOSPITAL ENCOUNTER (OUTPATIENT)
Age: 72
Setting detail: OUTPATIENT SURGERY
Discharge: HOME | End: 2022-04-21
Attending: PODIATRIST | Admitting: PODIATRIST
Payer: MEDICARE

## 2022-04-21 VITALS
HEART RATE: 68 BPM | WEIGHT: 250 LBS | RESPIRATION RATE: 18 BRPM | HEIGHT: 70 IN | BODY MASS INDEX: 35.79 KG/M2 | TEMPERATURE: 97 F | DIASTOLIC BLOOD PRESSURE: 63 MMHG | SYSTOLIC BLOOD PRESSURE: 121 MMHG | OXYGEN SATURATION: 96 %

## 2022-04-21 VITALS
OXYGEN SATURATION: 96 % | SYSTOLIC BLOOD PRESSURE: 110 MMHG | RESPIRATION RATE: 18 BRPM | DIASTOLIC BLOOD PRESSURE: 64 MMHG

## 2022-04-21 DIAGNOSIS — M72.2 PLANTAR FASCIITIS: Primary | ICD-10-CM

## 2022-04-21 LAB
METER GLUCOSE: 119 MG/DL (ref 74–99)
METER GLUCOSE: 139 MG/DL (ref 74–99)

## 2022-04-21 PROCEDURE — 7100000010 HC PHASE II RECOVERY - FIRST 15 MIN: Performed by: PODIATRIST

## 2022-04-21 PROCEDURE — 7100000011 HC PHASE II RECOVERY - ADDTL 15 MIN: Performed by: PODIATRIST

## 2022-04-21 PROCEDURE — 2580000003 HC RX 258: Performed by: ANESTHESIOLOGY

## 2022-04-21 PROCEDURE — 2709999900 HC NON-CHARGEABLE SUPPLY: Performed by: PODIATRIST

## 2022-04-21 PROCEDURE — 82962 GLUCOSE BLOOD TEST: CPT

## 2022-04-21 PROCEDURE — 2500000003 HC RX 250 WO HCPCS: Performed by: NURSE ANESTHETIST, CERTIFIED REGISTERED

## 2022-04-21 PROCEDURE — 82962 GLUCOSE BLOOD TEST: CPT | Performed by: PODIATRIST

## 2022-04-21 PROCEDURE — 2500000003 HC RX 250 WO HCPCS: Performed by: PODIATRIST

## 2022-04-21 PROCEDURE — 3600000002 HC SURGERY LEVEL 2 BASE: Performed by: PODIATRIST

## 2022-04-21 PROCEDURE — 3700000001 HC ADD 15 MINUTES (ANESTHESIA): Performed by: PODIATRIST

## 2022-04-21 PROCEDURE — 28890 HI ENRGY ESWT PLANTAR FASCIA: CPT | Performed by: PODIATRIST

## 2022-04-21 PROCEDURE — 6360000002 HC RX W HCPCS: Performed by: NURSE ANESTHETIST, CERTIFIED REGISTERED

## 2022-04-21 PROCEDURE — 3700000000 HC ANESTHESIA ATTENDED CARE: Performed by: PODIATRIST

## 2022-04-21 PROCEDURE — 3600000012 HC SURGERY LEVEL 2 ADDTL 15MIN: Performed by: PODIATRIST

## 2022-04-21 RX ORDER — SODIUM CHLORIDE, SODIUM LACTATE, POTASSIUM CHLORIDE, CALCIUM CHLORIDE 600; 310; 30; 20 MG/100ML; MG/100ML; MG/100ML; MG/100ML
INJECTION, SOLUTION INTRAVENOUS CONTINUOUS
Status: DISCONTINUED | OUTPATIENT
Start: 2022-04-21 | End: 2022-04-21 | Stop reason: HOSPADM

## 2022-04-21 RX ORDER — DIPHENHYDRAMINE HYDROCHLORIDE 50 MG/ML
12.5 INJECTION INTRAMUSCULAR; INTRAVENOUS
Status: DISCONTINUED | OUTPATIENT
Start: 2022-04-21 | End: 2022-04-21 | Stop reason: HOSPADM

## 2022-04-21 RX ORDER — SODIUM CHLORIDE 0.9 % (FLUSH) 0.9 %
5-40 SYRINGE (ML) INJECTION PRN
Status: DISCONTINUED | OUTPATIENT
Start: 2022-04-21 | End: 2022-04-21 | Stop reason: HOSPADM

## 2022-04-21 RX ORDER — BUPIVACAINE HYDROCHLORIDE 5 MG/ML
INJECTION, SOLUTION PERINEURAL PRN
Status: DISCONTINUED | OUTPATIENT
Start: 2022-04-21 | End: 2022-04-21 | Stop reason: ALTCHOICE

## 2022-04-21 RX ORDER — FENTANYL CITRATE 50 UG/ML
50 INJECTION, SOLUTION INTRAMUSCULAR; INTRAVENOUS EVERY 5 MIN PRN
Status: DISCONTINUED | OUTPATIENT
Start: 2022-04-21 | End: 2022-04-21 | Stop reason: HOSPADM

## 2022-04-21 RX ORDER — PROPOFOL 10 MG/ML
INJECTION, EMULSION INTRAVENOUS CONTINUOUS PRN
Status: DISCONTINUED | OUTPATIENT
Start: 2022-04-21 | End: 2022-04-21 | Stop reason: SDUPTHER

## 2022-04-21 RX ORDER — SODIUM CHLORIDE 9 MG/ML
INJECTION, SOLUTION INTRAVENOUS PRN
Status: DISCONTINUED | OUTPATIENT
Start: 2022-04-21 | End: 2022-04-21 | Stop reason: HOSPADM

## 2022-04-21 RX ORDER — OXYCODONE HYDROCHLORIDE AND ACETAMINOPHEN 5; 325 MG/1; MG/1
1 TABLET ORAL EVERY 4 HOURS PRN
Status: DISCONTINUED | OUTPATIENT
Start: 2022-04-21 | End: 2022-04-21 | Stop reason: HOSPADM

## 2022-04-21 RX ORDER — LIDOCAINE HYDROCHLORIDE 20 MG/ML
INJECTION, SOLUTION INTRAVENOUS PRN
Status: DISCONTINUED | OUTPATIENT
Start: 2022-04-21 | End: 2022-04-21 | Stop reason: SDUPTHER

## 2022-04-21 RX ORDER — PROPOFOL 10 MG/ML
INJECTION, EMULSION INTRAVENOUS PRN
Status: DISCONTINUED | OUTPATIENT
Start: 2022-04-21 | End: 2022-04-21 | Stop reason: SDUPTHER

## 2022-04-21 RX ORDER — FENTANYL CITRATE 50 UG/ML
INJECTION, SOLUTION INTRAMUSCULAR; INTRAVENOUS PRN
Status: DISCONTINUED | OUTPATIENT
Start: 2022-04-21 | End: 2022-04-21 | Stop reason: SDUPTHER

## 2022-04-21 RX ORDER — SODIUM CHLORIDE 0.9 % (FLUSH) 0.9 %
5-40 SYRINGE (ML) INJECTION EVERY 12 HOURS SCHEDULED
Status: DISCONTINUED | OUTPATIENT
Start: 2022-04-21 | End: 2022-04-21 | Stop reason: HOSPADM

## 2022-04-21 RX ORDER — MEPERIDINE HYDROCHLORIDE 25 MG/ML
12.5 INJECTION INTRAMUSCULAR; INTRAVENOUS; SUBCUTANEOUS EVERY 5 MIN PRN
Status: DISCONTINUED | OUTPATIENT
Start: 2022-04-21 | End: 2022-04-21 | Stop reason: HOSPADM

## 2022-04-21 RX ORDER — MORPHINE SULFATE 2 MG/ML
1 INJECTION, SOLUTION INTRAMUSCULAR; INTRAVENOUS EVERY 5 MIN PRN
Status: DISCONTINUED | OUTPATIENT
Start: 2022-04-21 | End: 2022-04-21 | Stop reason: HOSPADM

## 2022-04-21 RX ORDER — KETAMINE HYDROCHLORIDE 50 MG/ML
INJECTION, SOLUTION, CONCENTRATE INTRAMUSCULAR; INTRAVENOUS PRN
Status: DISCONTINUED | OUTPATIENT
Start: 2022-04-21 | End: 2022-04-21 | Stop reason: SDUPTHER

## 2022-04-21 RX ADMIN — PROPOFOL 70 MG: 10 INJECTION, EMULSION INTRAVENOUS at 13:28

## 2022-04-21 RX ADMIN — KETAMINE HYDROCHLORIDE 20 MG: 50 INJECTION INTRAMUSCULAR; INTRAVENOUS at 13:22

## 2022-04-21 RX ADMIN — SODIUM CHLORIDE, POTASSIUM CHLORIDE, SODIUM LACTATE AND CALCIUM CHLORIDE: 600; 310; 30; 20 INJECTION, SOLUTION INTRAVENOUS at 12:28

## 2022-04-21 RX ADMIN — LIDOCAINE HYDROCHLORIDE 20 MG: 20 INJECTION, SOLUTION INTRAVENOUS at 13:21

## 2022-04-21 RX ADMIN — PROPOFOL 70 MG: 10 INJECTION, EMULSION INTRAVENOUS at 13:21

## 2022-04-21 RX ADMIN — FENTANYL CITRATE 50 MCG: 50 INJECTION, SOLUTION INTRAMUSCULAR; INTRAVENOUS at 13:21

## 2022-04-21 RX ADMIN — PROPOFOL 70 MCG/KG/MIN: 10 INJECTION, EMULSION INTRAVENOUS at 13:21

## 2022-04-21 ASSESSMENT — PAIN SCALES - GENERAL
PAINLEVEL_OUTOF10: 0

## 2022-04-21 ASSESSMENT — LIFESTYLE VARIABLES: SMOKING_STATUS: 0

## 2022-04-21 ASSESSMENT — PAIN - FUNCTIONAL ASSESSMENT: PAIN_FUNCTIONAL_ASSESSMENT: 0-10

## 2022-04-21 NOTE — H&P
Update History & Physical     The patient's History and Physical this morning was reviewed with the patient and there were no significant changes. I examined the patient and there were no significant changes from the previous History and Physical.     Plan: The risk, benefits, expected outcome, and alternative to the recommended procedure have been discussed with the patient. Patient understands and wants to proceed with the procedure. The procedure discussed is 1. High energy extracorporeal shockwave therapy right foot.          Electronically signed by Sonny Lopez DPM on 4/21/2022 at 1:13 PM

## 2022-04-21 NOTE — ANESTHESIA POSTPROCEDURE EVALUATION
Department of Anesthesiology  Postprocedure Note    Patient: Pinky Ramsay  MRN: [id number]  YOB: 1950  Date of evaluation: 4/21/2022  Time:  2:02 PM     Procedure Summary     Date: 04/21/22 Room / Location: [address] [place] [id number] 03 / [address]    Anesthesia Start: 1320 Anesthesia Stop: 5697    Procedure: HIGH ENERGY EXTRACORPEAL SHOCKWAVE THERAPY RIGHT FOOT (Right ) Diagnosis: (plantar fasciitis right foot-M72.2, pain in right foot-M79.671)    Surgeons: Brea Trevino DPM Responsible Provider: Pedro Sue MD    Anesthesia Type: MAC ASA Status: 3          Anesthesia Type: MAC    Shabbir Phase I: Shabbir Score: 10    Shabbir Phase II: Shabbir Score: 9    Last vitals: Reviewed and per EMR flowsheets.        Anesthesia Post Evaluation    Patient location during evaluation: PACU  Patient participation: complete - patient participated  Level of consciousness: awake  Airway patency: patent  Nausea & Vomiting: no nausea and no vomiting  Complications: no  Cardiovascular status: hemodynamically stable  Respiratory status: room air and spontaneous ventilation  Hydration status: stable

## 2022-04-21 NOTE — OP NOTE
Operative Note      Patient: Onel Ballesteros  YOB: 1950  MRN: [id number]    Date of Procedure: 4/21/2022    Pre-Op Diagnosis: 1. Plantar fasciitis right foot-M72.2  2. Pain in right foot-M79.671    Post-Op Diagnosis: Same       Procedure(s):  HIGH ENERGY EXTRACORPEAL SHOCKWAVE THERAPY RIGHT FOOT    Surgeon(s):  Osmany Gaona DPM    Assistant:   * No surgical staff found *    Anesthesia: Monitor Anesthesia Care    Estimated Blood Loss (mL): None    Complications: None    Specimens:   * No specimens in log *    Implants:  * No implants in log *      Drains: * No LDAs found *    Findings: Chronic plantar fascial symptoms right foot      DESCRIPTION OF PROCEDURE:   After proper preoperative evaluation, patient was brought back to the operating room and placed in the operating table in the supine position. Monitored anesthesia was administered per anesthesia department. Patient did receive a total of 20 cc's of 0.5% Marcaine plain infiltrating the heel as well as blocking both the PT nerve and Sural nerve. The patient was then positioned for extracorporeal shockwave therapy. The [employer] high energy ESWT equipment was brought into the field and the nose piece for treatment was placed against the plantar right heel targeting the area previously determined with the patient's input of maximum pain. Then using standard extracorporeal shockwave protocol, the [employer] high energy ESWT was applied to the right foot. Extracorporeal shockwave therapy was initiated at power level #7 until 3800 total impulses were reached. Treatment site was evaluated, no excessive bruising or edema noted. The patient tolerated the procedure and anesthesia well and left the operating room in stable condition. The patient is being transported to the recovery room for post procedure management. Patient was given specific home-going instructions to follow.  Patient was advised to call the office with any questions or concerns prior to their scheduled follow up appointment.       Electronically signed by Abi Chou DPM on 4/21/2022 at 1:55 PM

## 2022-04-26 ENCOUNTER — OFFICE VISIT (OUTPATIENT)
Dept: PODIATRY | Age: 72
End: 2022-04-26

## 2022-04-26 VITALS
WEIGHT: 251 LBS | SYSTOLIC BLOOD PRESSURE: 138 MMHG | DIASTOLIC BLOOD PRESSURE: 84 MMHG | HEIGHT: 71 IN | BODY MASS INDEX: 35.14 KG/M2 | HEART RATE: 74 BPM

## 2022-04-26 DIAGNOSIS — M72.2 PLANTAR FASCIITIS: Primary | ICD-10-CM

## 2022-04-26 PROCEDURE — 99024 POSTOP FOLLOW-UP VISIT: CPT | Performed by: PODIATRIST

## 2022-04-26 NOTE — PROGRESS NOTES
22     Gerson Meyeru    : 1950   Sex: male    Age: 80 y.o. Patient's PCP/Provider is:  Jerry Vale. MD Jose    Subjective:  Patient is seen today for follow-up regarding continued care regarding shockwave therapy right lower extremity. Patient has noticed improvement already with reduced symptoms into the plantar right heel region. Patient is wearing his cam walker as instructed. No other additional abnormalities noted at this time. Chief Complaint   Patient presents with    Post-Op Check     right foot        ROS:  Const: Positives and pertinent negatives as per HPI. Musculo: Denies symptoms other than stated above. Neuro: Denies symptoms other than stated above. Skin: Denies symptoms other than stated above. Current Medications:    Current Outpatient Medications:     metFORMIN (GLUCOPHAGE-XR) 500 MG extended release tablet, TAKE TWO TABLETS BY MOUTH twice daily (Patient taking differently: 1,000 mg TAKE TWO TABLETS BY MOUTH twice daily, increased to 4 tab daily), Disp: 120 tablet, Rfl: 1    amLODIPine (NORVASC) 10 MG tablet, TAKE ONE TABLET BY MOUTH DAILY, Disp: 90 tablet, Rfl: 3    atorvastatin (LIPITOR) 20 MG tablet, Sig one daily, Disp: 90 tablet, Rfl: 3    zinc gluconate 50 MG tablet, Take 50 mg by mouth daily, Disp: , Rfl:     donepezil (ARICEPT) 10 MG tablet, Take 1 tablet by mouth nightly, Disp: 90 tablet, Rfl: 3    FLUoxetine (PROZAC) 40 MG capsule, TAKE ONE CAPSULE BY MOUTH DAILY, Disp: 90 capsule, Rfl: 3    aspirin 81 MG EC tablet, Take 1 tablet by mouth daily Stopped 1 week pre op, Disp: 90 tablet, Rfl: 3    Handicap Placard MISC, by Does not apply route Unable to ambulate more than 40 feet without difficulty Expires 2027, Disp: 1 each, Rfl: 0    blood glucose test strips (ONETOUCH VERIO) strip, USE TO CHECK BLOOD SUGAR DAILY.   SWITCH TO WHATEVER BRAND IS COVERED BY INSURANCE, Disp: 100 each, Rfl: 3    Lancets 30G MISC, 1 each by Does not apply route daily Pt uses Clear Choice Glucose Meter., Disp: 100 each, Rfl: 0    glucose monitoring kit (FREESTYLE) monitoring kit, 1 kit by Does not apply route daily Check sugars once a day, Disp: 1 kit, Rfl: 0    Blood Pressure Monitor LARY, Use daily to check blood pressure, Disp: 1 Device, Rfl: 0    Glucose Blood (GLUCOSE METER TEST VI), 1 each by In Vitro route daily, Disp: , Rfl:     Calcium Citrate-Vitamin D (CALCET CREAMY BITES) 500-400 MG-UNIT CHEW tablet, Take 1 tablet by mouth daily, Disp: , Rfl:     vitamin D (CHOLECALCIFEROL) 1000 UNIT TABS tablet, Take 1,000 Units by mouth daily, Disp: , Rfl:     vitamin B-12 (CYANOCOBALAMIN) 50 MCG tablet, Take 50 mcg by mouth daily, Disp: , Rfl:     ferrous gluconate (FERGON) 240 (27 Fe) MG tablet, Take 240 mg by mouth 3 times daily (with meals), Disp: , Rfl:     lisinopril (PRINIVIL;ZESTRIL) 5 MG tablet, Take 0.5 tablets by mouth daily, Disp: 45 tablet, Rfl: 1    Allergies:  No Known Allergies    Vitals:    04/26/22 1314   BP: 138/84   Pulse: 74   Weight: 251 lb (113.9 kg)   Height: 5' 11\" (1.803 m)       Exam:  Neurovascular status unchanged. Minimal tenderness noted to the plantar right heel and arch region with range of motion and muscle testing performed. No edema or ecchymotic skin changes present plantar right heel and arch region. Improved gait noted upon evaluation today. Diagnostic Studies:     No results found. Procedures:    None    Plan Per Assessment  Gino Duncan was seen today for post-op check. Diagnoses and all orders for this visit:    Plantar fasciitis      1. Post procedure evaluation and management  2. We did advise patient continued use of the cam walker for ambulatory purposes right lower extremity over the next week. 3. Patient is to increase activities to tolerance. 4. Patient will be followed up at a later date for continued evaluation and management.       Seen By:    William Lomeli DPM    Electronically signed by Antony Galindo Bertha Byrne DPM on 4/26/2022 at 1:25 PM    This note was created using voice recognition software. The note was reviewed however may contain grammatical errors.

## 2022-04-26 NOTE — PROGRESS NOTES
Patient is in today for 1 week post op of right foot. Patient says the shockwave therapy did help with the pain and is only having slight pain in the heel. pcp is Darryn Garcia MD  Last ov 4/6/22

## 2022-05-04 ENCOUNTER — OFFICE VISIT (OUTPATIENT)
Dept: PODIATRY | Age: 72
End: 2022-05-04

## 2022-05-04 VITALS — HEIGHT: 71 IN | BODY MASS INDEX: 35.14 KG/M2 | WEIGHT: 251 LBS

## 2022-05-04 DIAGNOSIS — E11.9 TYPE 2 DIABETES MELLITUS WITHOUT COMPLICATION, WITHOUT LONG-TERM CURRENT USE OF INSULIN (HCC): ICD-10-CM

## 2022-05-04 DIAGNOSIS — M72.2 PLANTAR FASCIITIS: Primary | ICD-10-CM

## 2022-05-04 PROCEDURE — 99024 POSTOP FOLLOW-UP VISIT: CPT | Performed by: PODIATRIST

## 2022-05-04 NOTE — PROGRESS NOTES
22     [facility]    : 1950   Sex: male    Age: 80 y.o. Patient's PCP/Provider is:  Landry Garcia MD    Subjective:  Patient is seen today for follow-up regarding continued evaluation regarding shockwave therapy right lower extremity. Overall patient is doing great at this time with no issues remaining. No other additional abnormalities noted. Patient has been wearing his good supportive shoe gear with all ambulatory activities. Chief Complaint   Patient presents with    Post-Op Check     right foot        ROS:  Const: Positives and pertinent negatives as per HPI. Musculo: Denies symptoms other than stated above. Neuro: Denies symptoms other than stated above. Skin: Denies symptoms other than stated above. Current Medications:    Current Outpatient Medications:     metFORMIN (GLUCOPHAGE-XR) 500 MG extended release tablet, TAKE TWO TABLETS BY MOUTH twice daily (Patient taking differently: 1,000 mg TAKE TWO TABLETS BY MOUTH twice daily, increased to 4 tab daily), Disp: 120 tablet, Rfl: 1    amLODIPine (NORVASC) 10 MG tablet, TAKE ONE TABLET BY MOUTH DAILY, Disp: 90 tablet, Rfl: 3    atorvastatin (LIPITOR) 20 MG tablet, Sig one daily, Disp: 90 tablet, Rfl: 3    zinc gluconate 50 MG tablet, Take 50 mg by mouth daily, Disp: , Rfl:     donepezil (ARICEPT) 10 MG tablet, Take 1 tablet by mouth nightly, Disp: 90 tablet, Rfl: 3    FLUoxetine (PROZAC) 40 MG capsule, TAKE ONE CAPSULE BY MOUTH DAILY, Disp: 90 capsule, Rfl: 3    aspirin 81 MG EC tablet, Take 1 tablet by mouth daily Stopped 1 week pre op, Disp: 90 tablet, Rfl: 3    Handicap Placard MISC, by Does not apply route Unable to ambulate more than 40 feet without difficulty Expires 2027, Disp: 1 each, Rfl: 0    blood glucose test strips (ONETOUCH VERIO) strip, USE TO CHECK BLOOD SUGAR DAILY.   SWITCH TO WHATEVER BRAND IS COVERED BY INSURANCE, Disp: 100 each, Rfl: 3    Lancets 30G MISC, 1 each by Does not apply route daily Pt uses Clear Choice Glucose Meter., Disp: 100 each, Rfl: 0    glucose monitoring kit (FREESTYLE) monitoring kit, 1 kit by Does not apply route daily Check sugars once a day, Disp: 1 kit, Rfl: 0    Blood Pressure Monitor LARY, Use daily to check blood pressure, Disp: 1 Device, Rfl: 0    Glucose Blood (GLUCOSE METER TEST VI), 1 each by In Vitro route daily, Disp: , Rfl:     Calcium Citrate-Vitamin D (CALCET CREAMY BITES) 500-400 MG-UNIT CHEW tablet, Take 1 tablet by mouth daily, Disp: , Rfl:     vitamin D (CHOLECALCIFEROL) 1000 UNIT TABS tablet, Take 1,000 Units by mouth daily, Disp: , Rfl:     vitamin B-12 (CYANOCOBALAMIN) 50 MCG tablet, Take 50 mcg by mouth daily, Disp: , Rfl:     ferrous gluconate (FERGON) 240 (27 Fe) MG tablet, Take 240 mg by mouth 3 times daily (with meals), Disp: , Rfl:     lisinopril (PRINIVIL;ZESTRIL) 5 MG tablet, Take 0.5 tablets by mouth daily, Disp: 45 tablet, Rfl: 1    Allergies:  No Known Allergies    Vitals:    05/04/22 1438   Weight: 251 lb (113.9 kg)   Height: 5' 11\" (1.803 m)       Exam:  Neurovascular status grossly intact. No tenderness noted into the plantar right heel region. Adequate range of motion ankle and subtalar joint region noted. Improved gait noted upon evaluation. Diagnostic Studies:     No results found. Procedures:    None    Plan Per Assessment  Santa Shin was seen today for post-op check. Diagnoses and all orders for this visit:    Plantar fasciitis    Type 2 diabetes mellitus without complication, without long-term current use of insulin ([facility])      1. Postoperative evaluation and management  2. We did discuss continued use of his good supportive diabetic shoes and insoles with all ambulatory activities. 3. Patient is to continue with his daily stretching and strengthening exercises when he does perform his exercise activities to prevent symptom reoccurrence.   4. Patient will be followed up at a later date for continued diabetic foot evaluation and care. Seen By:    Mia Sarah DPM    Electronically signed by Mia Sarah DPM on 5/4/2022 at 3:39 PM    This note was created using voice recognition software. The note was reviewed however may contain grammatical errors.

## 2022-05-04 NOTE — PROGRESS NOTES
Patient is in today for 1 week post op of right foot. Patient is not having any pain at all since surgery. pcp is Darryn Garcia MD  Last ov 4/6/22

## 2022-12-16 ENCOUNTER — HOSPITAL ENCOUNTER (OUTPATIENT)
Age: 72
End: 2022-12-16
Payer: MEDICARE

## 2022-12-16 ENCOUNTER — HOSPITAL ENCOUNTER (OUTPATIENT)
Dept: GENERAL RADIOLOGY | Age: 72
End: 2022-12-16
Payer: MEDICARE

## 2022-12-16 DIAGNOSIS — M54.50 LOW BACK PAIN, UNSPECIFIED BACK PAIN LATERALITY, UNSPECIFIED CHRONICITY, UNSPECIFIED WHETHER SCIATICA PRESENT: ICD-10-CM

## 2022-12-16 PROCEDURE — 72100 X-RAY EXAM L-S SPINE 2/3 VWS: CPT

## 2023-10-19 NOTE — PROGRESS NOTES
Initial Evaluation     Re-Evaluation     Ther Exercise         TE 45 3   Manual Therapy     MT     Ther Activities        TA     Gait Training          GT     Neuro Re-education NR     Modalities     Non-Billable Service Time     Other     Total Time/Units 45 3 No

## 2024-03-18 ENCOUNTER — TELEPHONE (OUTPATIENT)
Dept: SLEEP CENTER | Age: 74
End: 2024-03-18

## 2024-03-20 ENCOUNTER — HOSPITAL ENCOUNTER (INPATIENT)
Age: 74
LOS: 1 days | Discharge: HOME | End: 2024-03-22
Attending: EMERGENCY MEDICINE | Admitting: INTERNAL MEDICINE
Payer: COMMERCIAL

## 2024-03-20 ENCOUNTER — APPOINTMENT (OUTPATIENT)
Dept: CT IMAGING | Age: 74
End: 2024-03-20
Payer: COMMERCIAL

## 2024-03-20 DIAGNOSIS — N20.0 KIDNEY STONE: ICD-10-CM

## 2024-03-20 DIAGNOSIS — N39.0 URINARY TRACT INFECTION WITHOUT HEMATURIA, SITE UNSPECIFIED: Primary | ICD-10-CM

## 2024-03-20 LAB
ALBUMIN SERPL-MCNC: 4.4 G/DL (ref 3.5–5.2)
ALP SERPL-CCNC: 129 U/L (ref 40–129)
ALT SERPL-CCNC: 21 U/L (ref 0–40)
ANION GAP SERPL CALCULATED.3IONS-SCNC: 16 MMOL/L (ref 7–16)
AST SERPL-CCNC: 29 U/L (ref 0–39)
BACTERIA URNS QL MICRO: ABNORMAL
BASOPHILS # BLD: 0.09 K/UL (ref 0–0.2)
BASOPHILS NFR BLD: 0 % (ref 0–2)
BILIRUB SERPL-MCNC: 0.2 MG/DL (ref 0–1.2)
BILIRUB UR QL STRIP: NEGATIVE
BUN SERPL-MCNC: 19 MG/DL (ref 6–23)
CALCIUM SERPL-MCNC: 10 MG/DL (ref 8.6–10.2)
CHLORIDE SERPL-SCNC: 102 MMOL/L (ref 98–107)
CLARITY UR: CLEAR
CO2 SERPL-SCNC: 21 MMOL/L (ref 22–29)
COLOR UR: YELLOW
CREAT SERPL-MCNC: 1.4 MG/DL (ref 0.7–1.2)
CRYSTALS URNS MICRO: ABNORMAL /HPF
EOSINOPHIL # BLD: 0.07 K/UL (ref 0.05–0.5)
EOSINOPHILS RELATIVE PERCENT: 0 % (ref 0–6)
ERYTHROCYTE [DISTWIDTH] IN BLOOD BY AUTOMATED COUNT: 16 % (ref 11.5–15)
GFR SERPL CREATININE-BSD FRML MDRD: 54 ML/MIN/1.73M2
GLUCOSE SERPL-MCNC: 209 MG/DL (ref 74–99)
GLUCOSE UR STRIP-MCNC: 250 MG/DL
HCT VFR BLD AUTO: 40.9 % (ref 37–54)
HGB BLD-MCNC: 12.8 G/DL (ref 12.5–16.5)
HGB UR QL STRIP.AUTO: ABNORMAL
IMM GRANULOCYTES # BLD AUTO: 0.12 K/UL (ref 0–0.58)
IMM GRANULOCYTES NFR BLD: 1 % (ref 0–5)
KETONES UR STRIP-MCNC: 15 MG/DL
LEUKOCYTE ESTERASE UR QL STRIP: ABNORMAL
LIPASE SERPL-CCNC: 40 U/L (ref 13–60)
LYMPHOCYTES NFR BLD: 2.05 K/UL (ref 1.5–4)
LYMPHOCYTES RELATIVE PERCENT: 10 % (ref 20–42)
MCH RBC QN AUTO: 27 PG (ref 26–35)
MCHC RBC AUTO-ENTMCNC: 31.3 G/DL (ref 32–34.5)
MCV RBC AUTO: 86.3 FL (ref 80–99.9)
MONOCYTES NFR BLD: 1.3 K/UL (ref 0.1–0.95)
MONOCYTES NFR BLD: 7 % (ref 2–12)
NEUTROPHILS NFR BLD: 82 % (ref 43–80)
NEUTS SEG NFR BLD: 16.48 K/UL (ref 1.8–7.3)
NITRITE UR QL STRIP: POSITIVE
PH UR STRIP: 5.5 [PH] (ref 5–9)
PLATELET # BLD AUTO: 320 K/UL (ref 130–450)
PMV BLD AUTO: 12.9 FL (ref 7–12)
POTASSIUM SERPL-SCNC: 6.1 MMOL/L (ref 3.5–5)
PROT SERPL-MCNC: 8.2 G/DL (ref 6.4–8.3)
PROT UR STRIP-MCNC: ABNORMAL MG/DL
RBC # BLD AUTO: 4.74 M/UL (ref 3.8–5.8)
RBC #/AREA URNS HPF: ABNORMAL /HPF
SODIUM SERPL-SCNC: 139 MMOL/L (ref 132–146)
SP GR UR STRIP: >1.03 (ref 1–1.03)
UROBILINOGEN UR STRIP-ACNC: 0.2 EU/DL (ref 0–1)
WBC #/AREA URNS HPF: ABNORMAL /HPF
WBC OTHER # BLD: 20.1 K/UL (ref 4.5–11.5)

## 2024-03-20 PROCEDURE — 6360000002 HC RX W HCPCS: Performed by: NURSE PRACTITIONER

## 2024-03-20 PROCEDURE — 74177 CT ABD & PELVIS W/CONTRAST: CPT

## 2024-03-20 PROCEDURE — 6370000000 HC RX 637 (ALT 250 FOR IP): Performed by: NURSE PRACTITIONER

## 2024-03-20 PROCEDURE — 96375 TX/PRO/DX INJ NEW DRUG ADDON: CPT

## 2024-03-20 PROCEDURE — 85025 COMPLETE CBC W/AUTO DIFF WBC: CPT

## 2024-03-20 PROCEDURE — 96374 THER/PROPH/DIAG INJ IV PUSH: CPT

## 2024-03-20 PROCEDURE — 83690 ASSAY OF LIPASE: CPT

## 2024-03-20 PROCEDURE — 99285 EMERGENCY DEPT VISIT HI MDM: CPT

## 2024-03-20 PROCEDURE — 87086 URINE CULTURE/COLONY COUNT: CPT

## 2024-03-20 PROCEDURE — 80053 COMPREHEN METABOLIC PANEL: CPT

## 2024-03-20 PROCEDURE — 6360000004 HC RX CONTRAST MEDICATION: Performed by: RADIOLOGY

## 2024-03-20 PROCEDURE — 81001 URINALYSIS AUTO W/SCOPE: CPT

## 2024-03-20 RX ORDER — MAGNESIUM HYDROXIDE/ALUMINUM HYDROXICE/SIMETHICONE 120; 1200; 1200 MG/30ML; MG/30ML; MG/30ML
30 SUSPENSION ORAL ONCE
Status: COMPLETED | OUTPATIENT
Start: 2024-03-20 | End: 2024-03-20

## 2024-03-20 RX ORDER — ONDANSETRON 2 MG/ML
4 INJECTION INTRAMUSCULAR; INTRAVENOUS ONCE
Status: COMPLETED | OUTPATIENT
Start: 2024-03-20 | End: 2024-03-20

## 2024-03-20 RX ORDER — MORPHINE SULFATE 2 MG/ML
2 INJECTION, SOLUTION INTRAMUSCULAR; INTRAVENOUS ONCE
Status: COMPLETED | OUTPATIENT
Start: 2024-03-20 | End: 2024-03-20

## 2024-03-20 RX ADMIN — IOPAMIDOL 80 ML: 755 INJECTION, SOLUTION INTRAVENOUS at 23:54

## 2024-03-20 RX ADMIN — MORPHINE SULFATE 2 MG: 2 INJECTION, SOLUTION INTRAMUSCULAR; INTRAVENOUS at 23:16

## 2024-03-20 RX ADMIN — ALUMINUM HYDROXIDE, MAGNESIUM HYDROXIDE, AND SIMETHICONE 30 ML: 1200; 120; 1200 SUSPENSION ORAL at 23:16

## 2024-03-20 RX ADMIN — ONDANSETRON 4 MG: 2 INJECTION INTRAMUSCULAR; INTRAVENOUS at 23:16

## 2024-03-20 RX ADMIN — IOPAMIDOL 20 ML: 755 INJECTION, SOLUTION INTRAVENOUS at 23:54

## 2024-03-21 ENCOUNTER — APPOINTMENT (OUTPATIENT)
Dept: ULTRASOUND IMAGING | Age: 74
End: 2024-03-21
Payer: COMMERCIAL

## 2024-03-21 PROBLEM — N39.0 UTI (URINARY TRACT INFECTION): Status: ACTIVE | Noted: 2024-03-21

## 2024-03-21 PROBLEM — N13.30 HYDRONEPHROSIS DUE TO OBSTRUCTION OF BLADDER: Status: ACTIVE | Noted: 2024-03-21

## 2024-03-21 PROBLEM — N20.0 KIDNEY STONE: Status: ACTIVE | Noted: 2024-03-21

## 2024-03-21 PROBLEM — N13.30 HYDRONEPHROSIS: Status: ACTIVE | Noted: 2024-03-21

## 2024-03-21 PROBLEM — N32.0 HYDRONEPHROSIS DUE TO OBSTRUCTION OF BLADDER: Status: ACTIVE | Noted: 2024-03-21

## 2024-03-21 LAB
ANION GAP SERPL CALCULATED.3IONS-SCNC: 17 MMOL/L (ref 7–16)
BUN SERPL-MCNC: 19 MG/DL (ref 6–23)
CALCIUM SERPL-MCNC: 9 MG/DL (ref 8.6–10.2)
CHLORIDE SERPL-SCNC: 99 MMOL/L (ref 98–107)
CO2 SERPL-SCNC: 17 MMOL/L (ref 22–29)
CREAT SERPL-MCNC: 1.4 MG/DL (ref 0.7–1.2)
GFR SERPL CREATININE-BSD FRML MDRD: 56 ML/MIN/1.73M2
GLUCOSE BLD-MCNC: 125 MG/DL (ref 74–99)
GLUCOSE SERPL-MCNC: 229 MG/DL (ref 74–99)
LACTATE BLDV-SCNC: 1.9 MMOL/L (ref 0.5–2.2)
POTASSIUM SERPL-SCNC: 4.9 MMOL/L (ref 3.5–5)
SODIUM SERPL-SCNC: 133 MMOL/L (ref 132–146)

## 2024-03-21 PROCEDURE — 83605 ASSAY OF LACTIC ACID: CPT

## 2024-03-21 PROCEDURE — 6370000000 HC RX 637 (ALT 250 FOR IP): Performed by: INTERNAL MEDICINE

## 2024-03-21 PROCEDURE — 6370000000 HC RX 637 (ALT 250 FOR IP): Performed by: UROLOGY

## 2024-03-21 PROCEDURE — 6360000002 HC RX W HCPCS: Performed by: NURSE PRACTITIONER

## 2024-03-21 PROCEDURE — 96375 TX/PRO/DX INJ NEW DRUG ADDON: CPT

## 2024-03-21 PROCEDURE — 76775 US EXAM ABDO BACK WALL LIM: CPT

## 2024-03-21 PROCEDURE — 2580000003 HC RX 258: Performed by: INTERNAL MEDICINE

## 2024-03-21 PROCEDURE — 82962 GLUCOSE BLOOD TEST: CPT

## 2024-03-21 PROCEDURE — 2580000003 HC RX 258: Performed by: NURSE PRACTITIONER

## 2024-03-21 PROCEDURE — 87040 BLOOD CULTURE FOR BACTERIA: CPT

## 2024-03-21 PROCEDURE — 2060000000 HC ICU INTERMEDIATE R&B

## 2024-03-21 PROCEDURE — 6360000002 HC RX W HCPCS: Performed by: INTERNAL MEDICINE

## 2024-03-21 PROCEDURE — 80048 BASIC METABOLIC PNL TOTAL CA: CPT

## 2024-03-21 PROCEDURE — 93005 ELECTROCARDIOGRAM TRACING: CPT | Performed by: NURSE PRACTITIONER

## 2024-03-21 RX ORDER — ONDANSETRON 4 MG/1
4 TABLET, ORALLY DISINTEGRATING ORAL EVERY 8 HOURS PRN
Status: DISCONTINUED | OUTPATIENT
Start: 2024-03-21 | End: 2024-03-22 | Stop reason: HOSPADM

## 2024-03-21 RX ORDER — FLUOXETINE HYDROCHLORIDE 20 MG/1
40 CAPSULE ORAL DAILY
Status: DISCONTINUED | OUTPATIENT
Start: 2024-03-21 | End: 2024-03-22 | Stop reason: HOSPADM

## 2024-03-21 RX ORDER — QUINIDINE GLUCONATE 324 MG
240 TABLET, EXTENDED RELEASE ORAL
Status: DISCONTINUED | OUTPATIENT
Start: 2024-03-21 | End: 2024-03-22 | Stop reason: HOSPADM

## 2024-03-21 RX ORDER — LEVOTHYROXINE SODIUM 88 UG/1
1 CAPSULE ORAL EVERY MORNING
COMMUNITY

## 2024-03-21 RX ORDER — POTASSIUM CHLORIDE 20 MEQ/1
40 TABLET, EXTENDED RELEASE ORAL PRN
Status: DISCONTINUED | OUTPATIENT
Start: 2024-03-21 | End: 2024-03-22 | Stop reason: HOSPADM

## 2024-03-21 RX ORDER — SODIUM CHLORIDE 0.9 % (FLUSH) 0.9 %
5-40 SYRINGE (ML) INJECTION EVERY 12 HOURS SCHEDULED
Status: DISCONTINUED | OUTPATIENT
Start: 2024-03-21 | End: 2024-03-22 | Stop reason: HOSPADM

## 2024-03-21 RX ORDER — POLYETHYLENE GLYCOL 3350 17 G/17G
17 POWDER, FOR SOLUTION ORAL DAILY PRN
Status: DISCONTINUED | OUTPATIENT
Start: 2024-03-21 | End: 2024-03-22 | Stop reason: HOSPADM

## 2024-03-21 RX ORDER — LEVOTHYROXINE SODIUM 88 UG/1
88 TABLET ORAL DAILY
Status: DISCONTINUED | OUTPATIENT
Start: 2024-03-21 | End: 2024-03-22 | Stop reason: HOSPADM

## 2024-03-21 RX ORDER — AMLODIPINE BESYLATE 10 MG/1
10 TABLET ORAL DAILY
Status: DISCONTINUED | OUTPATIENT
Start: 2024-03-21 | End: 2024-03-22 | Stop reason: HOSPADM

## 2024-03-21 RX ORDER — DONEPEZIL HYDROCHLORIDE 5 MG/1
10 TABLET, FILM COATED ORAL NIGHTLY
Status: DISCONTINUED | OUTPATIENT
Start: 2024-03-21 | End: 2024-03-22 | Stop reason: HOSPADM

## 2024-03-21 RX ORDER — MAGNESIUM SULFATE IN WATER 40 MG/ML
2000 INJECTION, SOLUTION INTRAVENOUS PRN
Status: DISCONTINUED | OUTPATIENT
Start: 2024-03-21 | End: 2024-03-22 | Stop reason: HOSPADM

## 2024-03-21 RX ORDER — VITAMIN B COMPLEX
1000 TABLET ORAL DAILY
Status: DISCONTINUED | OUTPATIENT
Start: 2024-03-21 | End: 2024-03-22 | Stop reason: HOSPADM

## 2024-03-21 RX ORDER — ZINC GLUCONATE 50 MG
50 TABLET ORAL DAILY
Status: DISCONTINUED | OUTPATIENT
Start: 2024-03-21 | End: 2024-03-22 | Stop reason: HOSPADM

## 2024-03-21 RX ORDER — 0.9 % SODIUM CHLORIDE 0.9 %
1000 INTRAVENOUS SOLUTION INTRAVENOUS ONCE
Status: COMPLETED | OUTPATIENT
Start: 2024-03-21 | End: 2024-03-21

## 2024-03-21 RX ORDER — POTASSIUM CHLORIDE 7.45 MG/ML
10 INJECTION INTRAVENOUS PRN
Status: DISCONTINUED | OUTPATIENT
Start: 2024-03-21 | End: 2024-03-22 | Stop reason: HOSPADM

## 2024-03-21 RX ORDER — SODIUM CHLORIDE 9 MG/ML
INJECTION, SOLUTION INTRAVENOUS PRN
Status: DISCONTINUED | OUTPATIENT
Start: 2024-03-21 | End: 2024-03-22 | Stop reason: HOSPADM

## 2024-03-21 RX ORDER — ENOXAPARIN SODIUM 100 MG/ML
30 INJECTION SUBCUTANEOUS 2 TIMES DAILY
Status: DISCONTINUED | OUTPATIENT
Start: 2024-03-21 | End: 2024-03-22 | Stop reason: HOSPADM

## 2024-03-21 RX ORDER — METFORMIN HYDROCHLORIDE 500 MG/1
1000 TABLET, EXTENDED RELEASE ORAL
Status: DISCONTINUED | OUTPATIENT
Start: 2024-03-24 | End: 2024-03-22 | Stop reason: HOSPADM

## 2024-03-21 RX ORDER — ACETAMINOPHEN 650 MG/1
650 SUPPOSITORY RECTAL EVERY 6 HOURS PRN
Status: DISCONTINUED | OUTPATIENT
Start: 2024-03-21 | End: 2024-03-22 | Stop reason: HOSPADM

## 2024-03-21 RX ORDER — ATORVASTATIN CALCIUM 20 MG/1
20 TABLET, FILM COATED ORAL DAILY
Status: DISCONTINUED | OUTPATIENT
Start: 2024-03-21 | End: 2024-03-22 | Stop reason: HOSPADM

## 2024-03-21 RX ORDER — UBIDECARENONE 75 MG
50 CAPSULE ORAL DAILY
Status: DISCONTINUED | OUTPATIENT
Start: 2024-03-21 | End: 2024-03-22 | Stop reason: HOSPADM

## 2024-03-21 RX ORDER — LISINOPRIL 5 MG/1
2.5 TABLET ORAL DAILY
Status: DISCONTINUED | OUTPATIENT
Start: 2024-03-21 | End: 2024-03-22 | Stop reason: HOSPADM

## 2024-03-21 RX ORDER — SODIUM CHLORIDE 0.9 % (FLUSH) 0.9 %
5-40 SYRINGE (ML) INJECTION PRN
Status: DISCONTINUED | OUTPATIENT
Start: 2024-03-21 | End: 2024-03-22 | Stop reason: HOSPADM

## 2024-03-21 RX ORDER — ASPIRIN 81 MG/1
81 TABLET ORAL DAILY
Status: DISCONTINUED | OUTPATIENT
Start: 2024-03-21 | End: 2024-03-22 | Stop reason: HOSPADM

## 2024-03-21 RX ORDER — ACETAMINOPHEN 325 MG/1
650 TABLET ORAL EVERY 6 HOURS PRN
Status: DISCONTINUED | OUTPATIENT
Start: 2024-03-21 | End: 2024-03-22 | Stop reason: HOSPADM

## 2024-03-21 RX ORDER — ONDANSETRON 2 MG/ML
4 INJECTION INTRAMUSCULAR; INTRAVENOUS EVERY 6 HOURS PRN
Status: DISCONTINUED | OUTPATIENT
Start: 2024-03-21 | End: 2024-03-22 | Stop reason: HOSPADM

## 2024-03-21 RX ORDER — TAMSULOSIN HYDROCHLORIDE 0.4 MG/1
0.4 CAPSULE ORAL NIGHTLY
Status: DISCONTINUED | OUTPATIENT
Start: 2024-03-21 | End: 2024-03-22 | Stop reason: HOSPADM

## 2024-03-21 RX ADMIN — Medication 240 MG: at 18:06

## 2024-03-21 RX ADMIN — CEFTRIAXONE SODIUM 2000 MG: 2 INJECTION, POWDER, FOR SOLUTION INTRAMUSCULAR; INTRAVENOUS at 01:12

## 2024-03-21 RX ADMIN — DONEPEZIL HYDROCHLORIDE 10 MG: 5 TABLET, FILM COATED ORAL at 21:01

## 2024-03-21 RX ADMIN — FLUOXETINE HYDROCHLORIDE 40 MG: 20 CAPSULE ORAL at 13:10

## 2024-03-21 RX ADMIN — AMLODIPINE BESYLATE 10 MG: 10 TABLET ORAL at 13:10

## 2024-03-21 RX ADMIN — ATORVASTATIN CALCIUM 20 MG: 20 TABLET, FILM COATED ORAL at 13:10

## 2024-03-21 RX ADMIN — SODIUM CHLORIDE 1000 ML: 9 INJECTION, SOLUTION INTRAVENOUS at 01:12

## 2024-03-21 RX ADMIN — ENOXAPARIN SODIUM 30 MG: 100 INJECTION SUBCUTANEOUS at 21:04

## 2024-03-21 RX ADMIN — TAMSULOSIN HYDROCHLORIDE 0.4 MG: 0.4 CAPSULE ORAL at 21:01

## 2024-03-21 RX ADMIN — SODIUM CHLORIDE, PRESERVATIVE FREE 10 ML: 5 INJECTION INTRAVENOUS at 21:02

## 2024-03-21 RX ADMIN — LEVOTHYROXINE SODIUM 88 MCG: 0.09 TABLET ORAL at 13:11

## 2024-03-21 RX ADMIN — LISINOPRIL 2.5 MG: 5 TABLET ORAL at 13:10

## 2024-03-21 RX ADMIN — ASPIRIN 81 MG: 81 TABLET, COATED ORAL at 13:11

## 2024-03-21 RX ADMIN — ACETAMINOPHEN 650 MG: 325 TABLET ORAL at 21:03

## 2024-03-21 ASSESSMENT — PAIN DESCRIPTION - LOCATION: LOCATION: FLANK

## 2024-03-21 ASSESSMENT — PAIN DESCRIPTION - DESCRIPTORS: DESCRIPTORS: ACHING

## 2024-03-21 ASSESSMENT — PAIN - FUNCTIONAL ASSESSMENT: PAIN_FUNCTIONAL_ASSESSMENT: PREVENTS OR INTERFERES SOME ACTIVE ACTIVITIES AND ADLS

## 2024-03-21 ASSESSMENT — PAIN SCALES - GENERAL
PAINLEVEL_OUTOF10: 1
PAINLEVEL_OUTOF10: 1

## 2024-03-21 NOTE — ED PROVIDER NOTES
Shared TESS-ED Attending Visit.  CC: No  HPI:  3/21/24, Time: 2:23 AM EDT         Robby Phan is a 73 y.o. male presenting to the ED for evaluation of left flank and abdominal pain that started earlier today.  He reports it is also accompanied by nausea and dry heaving.  Patient states that he typically has at least 2 or so bowel movements a day and has not had more than 1.  He also reports that he has not been passing gas for the past 2 or 3 hours which is unusual for him.  He denies any urinary complaints, no fevers or chills, no chest pain, palpitations or shortness of breath.  Denies urinary complaints, no hematuria, no no melena or hematochezia.  Currently rating his pain is 6 out of 10 notes no specific alleviating or aggravating factors.  He did take a few Tylenol around 7 PM and 2 doses of Pepto-Bismol without any relief.  ROS:   Pertinent positives and negatives are stated within HPI, all other systems reviewed and are negative.  --------------------------------------------- PAST HISTORY ---------------------------------------------  Past Medical History:  has a past medical history of CAD (coronary artery disease), Dementia (HCC), DM (diabetes mellitus) (HCC), Hx of cardiovascular stress test, Hyperlipidemia, Hypertension, Moderate episode of recurrent major depressive disorder (HCC), Myocardial infarction (HCC), Osteoarthritis, and Sleep apnea.    Past Surgical History:  has a past surgical history that includes Penile prosthesis implant; Anterior cruciate ligament repair (2002 left knee); Marisa-en-Y Gastric Bypass (12/27/2004 Dr. Triplett); Splenectomy (12/27/2004 Dr. Triplett); ventral hernia repair (1/03/2007 Laparoscopic with mesh, Dr Edwards); Inguinal hernia repair (1991 & 1996); joint replacement (2011 right); Upper gastrointestinal endoscopy (12/15/2004 Dr. Triplett); Colonoscopy; other surgical history (02/18/2021); Heel spur surgery (Right, 2/18/2021); and Foot surgery (Right,  findings/Plan: Left sided nephrolithiasis. WBC count 20. Urinalysis positive for infection. IV antibiotics. Consult urology. Admit for further evaluation and treatment.    EKG:  Normal sinus rhythm with ventricular rate of 86.  CA interval, QRS duration and QT interval within normal range. Normal axis. Right bundle branch block. No ST segment abnormalities to suggest acute ischemia.    I personally saw the patient and made/approved the management plan and take responsibility for the management of the patient.     Stan Gray,   03/21/24 0252

## 2024-03-21 NOTE — H&P
Department of Internal Medicine  History and Physical    PCP: Dr. Toby Rao   Admitting Physician: Dr. Raul Pace  Consultants: Dr. Abiodun Marley      CHIEF COMPLAINT:  left flank pain    HISTORY OF PRESENT ILLNESS:    This is a 73 year old male patient that was brought into the hospital with complaints of left flank pain, nausea, and dry heaving.  CBC was reveals a white count of 20.1, CO2 17, anion gap 17, glucose 229, creatinine 0.4, this is above patient baseline most recently 0.8, lactic acid 1.9, blood cultures and urine culture pending at this time.  Urinalysis reveals positive nitrate with trace leukocyte Estrace, 10-20 WBCs with +2 bacteria.  CT abdomen pelvis was obtained reveals roughly 2 mm calculus within the proximal left ureter with mild  hydronephrosis, delayed renal enhancement, and perinephric stranding.  At this time he is being admitted for evaluation and treatment.  Urology will be consulted.    PAST MEDICAL Hx:  Past Medical History:   Diagnosis Date    CAD (coronary artery disease)     Dementia ([facility])     early stages  just short term memory loss currently    DM (diabetes mellitus) ([facility])     Hx of cardiovascular stress test 06/16/2020    Hyperlipidemia     Hypertension     Moderate episode of recurrent major depressive disorder ([facility]) 05/17/2018    Myocardial infarction ([facility]) 1995    slight mild    Osteoarthritis     Sleep apnea     does have machine- uses occasionaly       PAST SURGICAL Hx:   Past Surgical History:   Procedure Laterality Date    ANTERIOR CRUCIATE LIGAMENT REPAIR  2002 left knee    COLONOSCOPY      FOOT SURGERY Right 4/21/2022    HIGH ENERGY EXTRACORPEAL SHOCKWAVE THERAPY RIGHT FOOT performed by Jair Cochran Jr., DPM at [facility] [place]    HEEL SPUR SURGERY Right 2/18/2021    RESECTION EXOSTOSIS RIGHT FOOT, REPAIR ACHILLES TENDON RIGHT (CPT 82447) ([facility]) performed by Jair Cochran Jr., DPM at [facility] [place]    INGUINAL HERNIA REPAIR  1991 & 1996    JOINT  Not on file   Social History Narrative    Not on file         ROS:  General:   Denies chills, fatigue, fever, malaise, night sweats or weight loss    Psychological:   Denies anxiety, disorientation or hallucinations    ENT:    Denies epistaxis, headaches, vertigo or visual changes    Cardiovascular:   Denies any chest pain, irregular heartbeats, or palpitations. No paroxysmal nocturnal dyspnea.    Respiratory:   Denies shortness of breath, coughing, sputum production, hemoptysis, or wheezing.  No orthopnea.    Gastrointestinal:   Denies vomiting, diarrhea, or constipation.  Positive abdominal pain, nausea, dry heaves, and change in bowel habits or stools.      Genito-Urinary:    Denies any urgency, frequency, hematuria.  Voiding without difficulty.    Musculoskeletal:   Denies joint pain, joint stiffness, joint swelling or muscle pain    Neurology:    Denies any headache or focal neurological deficits. No weakness or paresthesia.    Derm:    Denies any rashes, ulcers, or excoriations.  Denies bruising.      Extremities:   Denies any lower extremity swelling or edema.      PHYSICAL EXAM:  VITALS:  Vitals:    03/21/24 0700   BP: (!) 160/87   Pulse: 75   Resp:    Temp:    SpO2: 95%     Constitutional/General: Alert and oriented x3, well appearing, non toxic in NAD  Head: Normocephalic and atraumatic  Eyes: PERRL, EOMI  Mouth: Oropharynx clear, handling secretions, no trismus  Neck: Supple, full ROM, non tender to palpation in the midline, no stridor, no crepitus, no meningeal signs  Pulmonary: Lungs clear to auscultation bilaterally, no wheezes, rales, or rhonchi. Not in respiratory distress  Cardiovascular:  Regular rate. Regular rhythm. No murmurs, gallops, or rubs. 2+ distal pulses  Chest: no chest wall tenderness  Abdomen: Soft.  Left upper and lower quadrants tender. Non distended.  +BS.  No rebound, guarding, or rigidity. No pulsatile masses appreciated. Left cva tenderness  Musculoskeletal: Moves all extremities

## 2024-03-21 NOTE — ACP (ADVANCE CARE PLANNING)
Advance Care Planning   Healthcare Decision Maker:    Primary Decision Maker: Katharine Phan - Spouse - [phone]    Secondary Decision Maker: EmilieKonrad - Child - [phone]    Click here to complete Healthcare Decision Makers including selection of the Healthcare Decision Maker Relationship (ie \"Primary\").  Today we documented Decision Maker(s) consistent with Legal Next of Kin hierarchy.

## 2024-03-21 NOTE — CARE COORDINATION
Case Management Assessment  Initial Evaluation    Date/Time of Evaluation: 3/21/2024 4:23 PM  Assessment Completed by: RUDDY Downing    If patient is discharged prior to next notation, then this note serves as note for discharge by case management.    Patient Name: Robby Phan                   YOB: 1950  Diagnosis: Kidney stone [N20.0]  UTI (urinary tract infection) [N39.0]  Urinary tract infection without hematuria, site unspecified [N39.0]  Hydronephrosis due to obstruction of bladder [N13.30, N32.0]                   Date / Time: 3/20/2024 10:15 PM    Patient Admission Status: Inpatient   Readmission Risk (Low < 19, Mod (19-27), High > 27): Readmission Risk Score: 12.2    Current PCP: William Rao APRN - CNP  PCP verified by CM? Yes    Chart Reviewed: yes       History Provided by: Patient, Spouse  Patient Orientation: Alert and Oriented, Person, Place, Situation    Patient Cognition: Alert    Hospitalization in the last 30 days (Readmission):  No    If yes, Readmission Assessment in  Navigator will be completed.    Advance Directives:      Code Status: Full Code   Patient's Primary Decision Maker is: Legal Next of Kin    Primary Decision Maker: Katharine Phan - Spouse - [phone]    Secondary Decision Maker: mEilieKonrad - Child - [phone]    Discharge Planning:    Patient lives with: Spouse/Significant Other Type of Home: House  Primary Care Giver: Spouse  Patient Support Systems include: Spouse/Significant Other, Children   Current Financial resources:    Current community resources:    Current services prior to admission: C-pap            Current DME:              Type of Home Care services:  None    ADLS  Prior functional level: Housework, Cooking, Assistance with the following:, Shopping  Current functional level: Housework, Cooking, Shopping, Assistance with the following:    PT AM-PAC:   /24  OT AM-PAC:   /24    Family can provide assistance at DC: Yes  Would you like Case  Management to discuss the discharge plan with any other family members/significant others, and if so, who? Yes (wife)  Plans to Return to Present Housing: Yes  Other Identified Issues/Barriers to RETURNING to current housing: none noted   Potential Assistance needed at discharge: N/A            Potential DME:    Patient expects to discharge to: House  Plan for transportation at discharge:      Financial    Payor: [employer] / Plan: [employer] / Product Type: *No Product type* /     Does insurance require precert for SNF: Yes    Potential assistance Purchasing Medications:    Meds-to-Beds request: Yes      [id number] - [place], [place] - [address] -  [phone] - F [phone]  [address] [address]  Phone: [phone] Fax: [phone]    [facility] [place], [place] - [address] - P [phone] - F [phone]  [facility] [place] [address]  Phone: [phone] Fax: [phone]      Notes:    Factors facilitating achievement of predicted outcomes: Family support, Motivated, Cooperative, Pleasant, and Has needed Durable Medical Equipment at home    Barriers to discharge: none noted     Additional Case Management Notes: Sw met with pt and wife at bedside to discuss transition to care. Pt resides with wife in ran home, 2 BRETT. He is independent with ADLs, wife assist with IADLS. Has number of dme ( ww, w/c, BSC and shower chair) but none currently in use. Hx HHC 2011 when had TKA, no hx DARIO/rehab. PCP Dr Rao/Pharm Elena Villegas. Has cpap but does not use, to have a sleep study 4/3. Dc plan return home with wife, who will provide home going transport.           RUDDY Downing  Case Management Department  Ph:  Fax:

## 2024-03-21 NOTE — CONSULTS
3/21/2024 9:06 AM  Robby Phan  [id number]     Chief Complaint:    2mm left proximal stone      History of Present Illness:      The patient is a 73 y.o. male patient who presented to the hospital with complaints of left flank pain, nausea, and emesis.  He had a CT abdomen pelvis performed that showed a 2 mm left proximal ureteral calculi with some mild left hydronephrosis.  He was also found to have a UTI.  He was admitted for further evaluation.    He denies any previous history of kidney stones.  He does have a history of ED status post inflatable penile prosthesis placement back in 2003 at [place].    Currently he denies any flank pain or nausea.  He has not had pain medicine since last night around midnight.  He is feeling much better.  Denies any fever or chills.      Past Medical History:   Diagnosis Date    CAD (coronary artery disease)     Dementia ([facility])     early stages  just short term memory loss currently    DM (diabetes mellitus) ([facility])     Hx of cardiovascular stress test 06/16/2020    Hyperlipidemia     Hypertension     Moderate episode of recurrent major depressive disorder ([facility]) 05/17/2018    Myocardial infarction ([facility]) 1995    slight mild    Osteoarthritis     Sleep apnea     does have machine- uses occasionaly         Past Surgical History:   Procedure Laterality Date    ANTERIOR CRUCIATE LIGAMENT REPAIR  2002 left knee    COLONOSCOPY      FOOT SURGERY Right 4/21/2022    HIGH ENERGY EXTRACORPEAL SHOCKWAVE THERAPY RIGHT FOOT performed by Jair Cochran Jr., DPM at [facility] OR    HEEL SPUR SURGERY Right 2/18/2021    RESECTION EXOSTOSIS RIGHT FOOT, REPAIR ACHILLES TENDON RIGHT (CPT 73651) ([facility]) performed by Jair Cochran Jr., DPM at [facility] OR    INGUINAL HERNIA REPAIR  1991 & 1996    JOINT REPLACEMENT  2011 right    right knee    OTHER SURGICAL HISTORY  02/18/2021    resection retrocalcaneal exostosia right foot, repair achilles tendon right foot    PENILE PROSTHESIS    Patient has no known allergies.    Social History:    reports that he quit smoking about 54 years ago. His smoking use included cigarettes. He started smoking about 59 years ago. He has a 2.5 pack-year smoking history. He has never used smokeless tobacco. He reports that he does not drink alcohol and does not use drugs.    Family History:   Non-contributory to this Urological problem  family history includes Breast Cancer in his sister; Heart Disease in his brother, father, and mother; High Blood Pressure in his brother, father, and sister.    Review of Systems:  Constitutional: No fever or chills   Respiratory: negative for cough and hemoptysis  Cardiovascular: negative for chest pain and dyspnea  Gastrointestinal: negative for abdominal pain, diarrhea, nausea and vomiting   Derm: negative for rash and skin lesion(s)  Neurological: negative for seizures and tremors  Musculoskeletal: Negative    Psychiatric: Negative   : As above in the HPI, otherwise negative  All other reviews are negative    Physical Exam:     Vitals:  BP (!) 160/87   Pulse 75   Temp 98.1 °F (36.7 °C)   Resp 20   Ht 1.803 m (5' 11\")   Wt 112 kg (247 lb)   SpO2 95%   BMI 34.45 kg/m²     General:  Awake, alert, oriented X 3. No apparent distress.  HEENT:  Normocephalic, atraumatic.  Lungs:  Respirations symmetric and non-labored.  Abdomen:  soft, nontender, no masses  Extremities:  No clubbing, cyanosis, or edema  Skin:  Warm and dry, no open lesions or rashes  Neuro: There are no motor or sensory deficits in the 4 quadrant extremities   Rectal: deferred  Genitourinary: No Silva    Labs:     Recent Labs     03/20/24 2248   WBC 20.1*   RBC 4.74   HGB 12.8   HCT 40.9   MCV 86.3   MCH 27.0   MCHC 31.3*   RDW 16.0*      MPV 12.9*         Recent Labs     03/20/24 2248 03/21/24  0050   CREATININE 1.4* 1.4*       Lab Results   Component Value Date    PSA 2.36 04/07/2022    PSA 1.33 04/03/2019    PSA SEE NOTE (AA) 04/03/2019

## 2024-03-22 VITALS
SYSTOLIC BLOOD PRESSURE: 129 MMHG | BODY MASS INDEX: 34.38 KG/M2 | HEIGHT: 71 IN | DIASTOLIC BLOOD PRESSURE: 73 MMHG | WEIGHT: 245.6 LBS | HEART RATE: 74 BPM | OXYGEN SATURATION: 94 % | RESPIRATION RATE: 18 BRPM | TEMPERATURE: 97.9 F

## 2024-03-22 LAB
ANION GAP SERPL CALCULATED.3IONS-SCNC: 12 MMOL/L (ref 7–16)
BASOPHILS # BLD: 0.08 K/UL (ref 0–0.2)
BASOPHILS NFR BLD: 1 % (ref 0–2)
BUN SERPL-MCNC: 12 MG/DL (ref 6–23)
CALCIUM SERPL-MCNC: 9.1 MG/DL (ref 8.6–10.2)
CHLORIDE SERPL-SCNC: 100 MMOL/L (ref 98–107)
CO2 SERPL-SCNC: 26 MMOL/L (ref 22–29)
CREAT SERPL-MCNC: 1 MG/DL (ref 0.7–1.2)
EKG ATRIAL RATE: 86 BPM
EKG P AXIS: 25 DEGREES
EKG P-R INTERVAL: 162 MS
EKG Q-T INTERVAL: 410 MS
EKG QRS DURATION: 136 MS
EKG QTC CALCULATION (BAZETT): 490 MS
EKG R AXIS: 21 DEGREES
EKG T AXIS: 10 DEGREES
EKG VENTRICULAR RATE: 86 BPM
EOSINOPHIL # BLD: 0.27 K/UL (ref 0.05–0.5)
EOSINOPHILS RELATIVE PERCENT: 4 % (ref 0–6)
ERYTHROCYTE [DISTWIDTH] IN BLOOD BY AUTOMATED COUNT: 15.9 % (ref 11.5–15)
GFR SERPL CREATININE-BSD FRML MDRD: >60 ML/MIN/1.73M2
GLUCOSE SERPL-MCNC: 147 MG/DL (ref 74–99)
HCT VFR BLD AUTO: 35.5 % (ref 37–54)
HGB BLD-MCNC: 11.2 G/DL (ref 12.5–16.5)
IMM GRANULOCYTES # BLD AUTO: <0.03 K/UL (ref 0–0.58)
IMM GRANULOCYTES NFR BLD: 0 % (ref 0–5)
LYMPHOCYTES NFR BLD: 2.24 K/UL (ref 1.5–4)
LYMPHOCYTES RELATIVE PERCENT: 33 % (ref 20–42)
MCH RBC QN AUTO: 27.5 PG (ref 26–35)
MCHC RBC AUTO-ENTMCNC: 31.5 G/DL (ref 32–34.5)
MCV RBC AUTO: 87 FL (ref 80–99.9)
MICROORGANISM SPEC CULT: NO GROWTH
MONOCYTES NFR BLD: 0.7 K/UL (ref 0.1–0.95)
MONOCYTES NFR BLD: 10 % (ref 2–12)
NEUTROPHILS NFR BLD: 52 % (ref 43–80)
NEUTS SEG NFR BLD: 3.57 K/UL (ref 1.8–7.3)
PLATELET # BLD AUTO: 272 K/UL (ref 130–450)
PMV BLD AUTO: 12.9 FL (ref 7–12)
POTASSIUM SERPL-SCNC: 4.2 MMOL/L (ref 3.5–5)
RBC # BLD AUTO: 4.08 M/UL (ref 3.8–5.8)
SERVICE CMNT-IMP: NORMAL
SODIUM SERPL-SCNC: 138 MMOL/L (ref 132–146)
SPECIMEN DESCRIPTION: NORMAL
WBC OTHER # BLD: 6.9 K/UL (ref 4.5–11.5)

## 2024-03-22 PROCEDURE — 2580000003 HC RX 258: Performed by: INTERNAL MEDICINE

## 2024-03-22 PROCEDURE — 6370000000 HC RX 637 (ALT 250 FOR IP): Performed by: INTERNAL MEDICINE

## 2024-03-22 PROCEDURE — 93010 ELECTROCARDIOGRAM REPORT: CPT | Performed by: INTERNAL MEDICINE

## 2024-03-22 PROCEDURE — 97530 THERAPEUTIC ACTIVITIES: CPT | Performed by: OCCUPATIONAL THERAPIST

## 2024-03-22 PROCEDURE — 85025 COMPLETE CBC W/AUTO DIFF WBC: CPT

## 2024-03-22 PROCEDURE — 97161 PT EVAL LOW COMPLEX 20 MIN: CPT

## 2024-03-22 PROCEDURE — 6360000002 HC RX W HCPCS: Performed by: INTERNAL MEDICINE

## 2024-03-22 PROCEDURE — 80048 BASIC METABOLIC PNL TOTAL CA: CPT

## 2024-03-22 PROCEDURE — 36415 COLL VENOUS BLD VENIPUNCTURE: CPT

## 2024-03-22 PROCEDURE — 97165 OT EVAL LOW COMPLEX 30 MIN: CPT | Performed by: OCCUPATIONAL THERAPIST

## 2024-03-22 RX ORDER — TAMSULOSIN HYDROCHLORIDE 0.4 MG/1
0.4 CAPSULE ORAL NIGHTLY
Qty: 30 CAPSULE | Refills: 3 | Status: SHIPPED | OUTPATIENT
Start: 2024-03-22

## 2024-03-22 RX ORDER — CIPROFLOXACIN 500 MG/1
500 TABLET, FILM COATED ORAL 2 TIMES DAILY
Qty: 14 TABLET | Refills: 0 | Status: SHIPPED | OUTPATIENT
Start: 2024-03-22 | End: 2024-03-29

## 2024-03-22 RX ORDER — SODIUM CHLORIDE 9 MG/ML
INJECTION, SOLUTION INTRAVENOUS CONTINUOUS
Status: DISCONTINUED | OUTPATIENT
Start: 2024-03-22 | End: 2024-03-22 | Stop reason: HOSPADM

## 2024-03-22 RX ADMIN — FLUOXETINE HYDROCHLORIDE 40 MG: 20 CAPSULE ORAL at 09:33

## 2024-03-22 RX ADMIN — ENOXAPARIN SODIUM 30 MG: 100 INJECTION SUBCUTANEOUS at 09:36

## 2024-03-22 RX ADMIN — ASPIRIN 81 MG: 81 TABLET, COATED ORAL at 09:33

## 2024-03-22 RX ADMIN — LISINOPRIL 2.5 MG: 5 TABLET ORAL at 09:33

## 2024-03-22 RX ADMIN — AMLODIPINE BESYLATE 10 MG: 10 TABLET ORAL at 09:33

## 2024-03-22 RX ADMIN — SODIUM CHLORIDE, PRESERVATIVE FREE 10 ML: 5 INJECTION INTRAVENOUS at 09:08

## 2024-03-22 RX ADMIN — ATORVASTATIN CALCIUM 20 MG: 20 TABLET, FILM COATED ORAL at 09:35

## 2024-03-22 RX ADMIN — WATER 1000 MG: 1 INJECTION INTRAMUSCULAR; INTRAVENOUS; SUBCUTANEOUS at 01:07

## 2024-03-22 RX ADMIN — SODIUM CHLORIDE: 9 INJECTION, SOLUTION INTRAVENOUS at 09:07

## 2024-03-22 RX ADMIN — LEVOTHYROXINE SODIUM 88 MCG: 0.09 TABLET ORAL at 05:29

## 2024-03-22 ASSESSMENT — PAIN SCALES - GENERAL: PAINLEVEL_OUTOF10: 0

## 2024-03-22 NOTE — PROGRESS NOTES
Physical Therapy Initial Evaluation/Plan of Care    Room #:  0620/0620-01  Patient Name: Robby Phan  YOB: 1950  MRN: [id number]    Date of Service: 3/22/2024     Tentative placement recommendation: Home    Equipment recommendation: Patient has needed equipment       Evaluating Physical Therapist: Claudio Burgos, PT #[id number]      Specific Provider Orders/Date/Referring Provider :   03/21/24 1200    PT evaluation and treat  Start:  03/21/24 1200,   End:  03/21/24 1200,   ONE TIME,   Standing Count:  1 Occurrences,   R       Raul Pace MD    Admitting Diagnosis:   Kidney stone [N20.0]  UTI (urinary tract infection) [N39.0]  Urinary tract infection without hematuria, site unspecified [N39.0]  Hydronephrosis due to obstruction of bladder [N13.30, N32.0]      Surgery: none      Patient Active Problem List   Diagnosis    Postsurgical nonabsorption    Numbness and tingling    Small vessel disease (HCC)    Cervical muscle pain    Hypercholesteremia    Carpal tunnel syndrome    Essential hypertension    Moderate episode of recurrent major depressive disorder (HCC)    Memory loss    Cerebral ischemia    Cognitive dysfunction    Exertional dyspnea    Severe obesity (BMI 35.0-35.9 with comorbidity) (HCC)    Venous insufficiency (chronic) (peripheral)    Dementia without behavioral disturbance (HCC)    Type 2 diabetes mellitus without complication, without long-term current use of insulin (HCC)    Type II diabetes mellitus with peripheral circulatory disorder (HCC)    Localized edema    Right foot pain    Achilles tendinosis of right lower extremity    Difficulty walking    GILDARDO (obstructive sleep apnea)    Tendinitis of right foot    Pain in right ankle and joints of right foot    Plantar fasciitis    Pain of right foot    UTI (urinary tract infection)    Kidney stone    Hydronephrosis    Hydronephrosis due to obstruction of bladder        ASSESSMENT of Current Deficits Patient exhibits decreased balance and  a bed to a chair?: A Little  How much help is needed standing up from a chair using your arms?: A Little  How much help is needed walking in hospital room?: A Little  How much help is needed climbing 3-5 steps with a railing?: A Little  AM-PAC Inpatient Mobility Raw Score : 20  AM-PAC Inpatient T-Scale Score : 47.67  Mobility Inpatient CMS 0-100% Score: 35.83  Mobility Inpatient CMS G-Code Modifier : CJ    Nursing cleared patient for PT evaluation. The admitting diagnosis and active problem list as listed above have been reviewed prior to the initiation of this evaluation.    OBJECTIVE:   Initial Evaluation  Date: 3/22/2024 Treatment Date:     Short Term/ Long Term   Goals   Was pt agreeable to Eval/treatment? Yes  To be met in 3 days   Pain level 0  /10       Bed Mobility  Using rails and head of bed elevated:     Rolling: Independent    Supine to sit: Supervision     Sit to supine: Supervision     Scooting: Supervision     Rolling: Independent    Supine to sit: Independent    Sit to supine: Independent    Scooting: Independent     Transfers Sit to stand: Supervision    Sit to stand: Independent    Ambulation     50 feet using  IV pole with Supervision        100 feet using  no device with Independent    Stair negotiation: ascended and descended   2 steps, with rail, Supervision        2 steps, with rail, Independent      ROM Within functional limits    Increase range of motion 10% of affected joints    Strength BUE:  refer to OT eval  RLE:  4/5  LLE:  4/5  Increase strength in affected mm groups by 1/3 grade   Balance Sitting EOB:  good -  Dynamic Standing:  fair + IV pole  Sitting EOB:  good   Dynamic Standing: good      Patient is Alert & Oriented x person, place, time, and situation and follows directions    Sensation:  Patient  denies numbness/tingling   Edema:  no   Endurance: good  -    Vitals: room air   Blood Pressure at rest  Blood Pressure during session    Heart Rate at rest  Heart Rate during session

## 2024-03-22 NOTE — PROGRESS NOTES
3/22/2024 11:38 AM  Robby Phan  [id number]    Subjective:    Sitting up in a chair   No carmen   Currently denies flank pain  No fevers  Has not passed the stone that he knows of     Review of Systems  Constitutional: No fever or chills   Respiratory: negative for cough and hemoptysis  Cardiovascular: negative for chest pain and dyspnea  Gastrointestinal: negative for abdominal pain, diarrhea, nausea and vomiting   : See above  Derm: negative for rash and skin lesion(s)  Neurological: negative for seizures and tremors  Musculoskeletal: Negative    Psychiatric: Negative   All other reviews are negative      Scheduled Meds:   tamsulosin  0.4 mg Oral Nightly    oyster shell calcium w/D  1 tablet Oral Daily    Vitamin D  1,000 Units Oral Daily    vitamin B-12  50 mcg Oral Daily    ferrous gluconate  240 mg Oral TID WC    lisinopril  2.5 mg Oral Daily    amLODIPine  10 mg Oral Daily    atorvastatin  20 mg Oral Daily    zinc gluconate  50 mg Oral Daily    donepezil  10 mg Oral Nightly    FLUoxetine  40 mg Oral Daily    aspirin  81 mg Oral Daily    [START ON 3/24/2024] metFORMIN  1,000 mg Oral Daily with breakfast    levothyroxine  88 mcg Oral Daily    cefTRIAXone (ROCEPHIN) IV  1,000 mg IntraVENous Q24H    sodium chloride flush  5-40 mL IntraVENous 2 times per day    enoxaparin  30 mg SubCUTAneous BID       Objective:  Vitals:    03/22/24 1015   BP: 129/73   Pulse: 74   Resp: 18   Temp: 97.9 °F (36.6 °C)   SpO2: 94%         Allergies: Patient has no known allergies.    General Appearance: alert and oriented to person, place and time and in no acute distress  Skin: no rash or erythema  Head: normocephalic and atraumatic  Pulmonary/Chest: normal air movement, no respiratory distress  Abdomen: soft, non-tender, non-distended  Genitourinary: no carmen   Extremities: no cyanosis, clubbing or edema         Labs:     Recent Labs     03/22/24  0911      K 4.2      CO2 26   BUN 12   CREATININE 1.0   GLUCOSE 147*    CALCIUM 9.1       Lab Results   Component Value Date/Time    HGB 11.2 03/22/2024 05:20 AM    HCT 35.5 03/22/2024 05:20 AM       Lab Results   Component Value Date    PSA 2.36 04/07/2022    PSA 1.33 04/03/2019    PSA SEE NOTE (AA) 04/03/2019         Assessment/Plan:  2mm left proximal stone   Mild left hydronephrosis   UTI      Creatinine stable   Leukocytosis has resolved, 20>>6.9  Urine culture pending  Blood cultures no growth to date   Continue to strain the urine  Continue the Flomax   Hold on stenting at this time   He continues to have good pain control, has only needed one dose of tylenol since admission for discomfort  Spoke with him and his wife in detail. Will allow for spontaneous passage of the stone. He will follow up in our office as an outpatient as well. They were made aware to return to the hospital if any worsening pain, fevers, chills, or nausea  Hold on additional  interventions at this time          BRITTANIE Lozano - CNP   [facility]  Urology    I agree with the assessment and plan of ROSSANA Lozano. I personally evaluated the patient and made any changes to reflect my impression and plan.  Possibly has passed the stone already.  Feeling well without any complaints whatsoever.  Improved.  Okay for discharge from urology standpoint.  Please call with questions or concerns.    Abiodun Reaves MD  3/22/2024

## 2024-03-22 NOTE — CARE COORDINATION
SOCIAL WORK / DISCHARGE PLANNING:  Pt will return home with wife when medically cleared for discharge. Independent, has necessary dme but currently not using any. No dc needs identified or requested at this time. Wife will provide home going transport.   '                Electronically signed by RUDDY Downing on 3/22/2024 at 1:18 PM

## 2024-03-22 NOTE — PROGRESS NOTES
OCCUPATIONAL THERAPY INITIAL EVALUATION    [facility]  [address]         Date:3/22/2024                                                   Patient Name: Robby Phan     MRN: [id number]     : 1950     Room: [address] [place] [address]       Evaluating OT: Anel Mullen, OTR/L; [id number]       Referring Provider and Orders/Date:    OT eval and treat  Start:  24 1200,   End:  24 1200,   ONE TIME,   Standing Count:  1 Occurrences,   R       Raul Pace MD    Recommended placement: home indep with wife assist       Diagnosis:   1. Urinary tract infection without hematuria, site unspecified    2. Kidney stone         Surgery: none      Pertinent Medical History:        Past Medical History:   Diagnosis Date    CAD (coronary artery disease)     Dementia ([facility])     early stages  just short term memory loss currently    DM (diabetes mellitus) ([facility])     Hx of cardiovascular stress test 2020    Hyperlipidemia     Hypertension     Moderate episode of recurrent major depressive disorder ([facility]) 2018    Myocardial infarction ([facility]) 1995    slight mild    Osteoarthritis     Sleep apnea     does have machine- uses occasionaly          Past Surgical History:   Procedure Laterality Date    ANTERIOR CRUCIATE LIGAMENT REPAIR  2002 left knee    COLONOSCOPY      FOOT SURGERY Right 2022    HIGH ENERGY EXTRACORPEAL SHOCKWAVE THERAPY RIGHT FOOT performed by Jair Cochran Jr., DPM at [facility] OR    HEEL SPUR SURGERY Right 2021    RESECTION EXOSTOSIS RIGHT FOOT, REPAIR ACHILLES TENDON RIGHT (CPT 22874) (ARTHREX) performed by Jair Cochran Jr., DPM at [facility] OR    INGUINAL HERNIA REPAIR   &     JOINT REPLACEMENT  2011 right    right knee    OTHER SURGICAL HISTORY  2021    resection retrocalcaneal exostosia right foot, repair achilles tendon right foot    PENILE PROSTHESIS IMPLANT      CHET-EN-Y GASTRIC BYPASS

## 2024-03-22 NOTE — PROGRESS NOTES
CLINICAL PHARMACY NOTE: MEDS TO BEDS    Total # of Prescriptions Filled: 2   The following medications were delivered to the patient:  Tamsulosin 0.4 mg  Ciprofloxacin 500 mg    Additional Documentation:

## 2024-03-24 LAB
MICROORGANISM SPEC CULT: NORMAL
MICROORGANISM SPEC CULT: NORMAL
SERVICE CMNT-IMP: NORMAL
SERVICE CMNT-IMP: NORMAL
SPECIMEN DESCRIPTION: NORMAL
SPECIMEN DESCRIPTION: NORMAL

## 2024-04-02 NOTE — PROGRESS NOTES
22     Jennifer Livingston    : 1950   Sex: male    Age: 80 y.o. Patient's PCP/Provider is:  Theresa Macdonald. MD Jose    Subjective:  Patient is seen today for follow-up regarding continued evaluation regarding recalcitrant plantar fascial issues right foot. Patient is still having discomfort into the right posterior heel and plantar heel region with every day ambulatory activities. He has still been utilizing his good supportive shoes and diabetic insoles. Patient has been performing his daily stretching and strengthening exercises as well. He wanted to discuss other potential treatment options available at this time. No other additional abnormalities noted. Chief Complaint   Patient presents with    Foot Pain     right foot        ROS:  Const: Positives and pertinent negatives as per HPI. Musculo: Denies symptoms other than stated above. Neuro: Denies symptoms other than stated above. Skin: Denies symptoms other than stated above. Current Medications:    Current Outpatient Medications:     lisinopril (PRINIVIL;ZESTRIL) 5 MG tablet, Take 0.5 tablets by mouth daily, Disp: 45 tablet, Rfl: 1    meloxicam (MOBIC) 7.5 MG tablet, Take 1 tablet by mouth daily as needed for Pain, Disp: 30 tablet, Rfl: 0    atorvastatin (LIPITOR) 20 MG tablet, TAKE ONE TABLET BY MOUTH DAILY, Disp: 90 tablet, Rfl: 0    metFORMIN (GLUCOPHAGE-XR) 500 MG extended release tablet, TAKE TWO TABLETS BY MOUTH EVERY DAY WITH BREAKFAST, Disp: 60 tablet, Rfl: 5    FLUoxetine (PROZAC) 40 MG capsule, TAKE ONE CAPSULE BY MOUTH DAILY, Disp: 90 capsule, Rfl: 2    amLODIPine (NORVASC) 10 MG tablet, TAKE ONE TABLET BY MOUTH DAILY, Disp: 90 tablet, Rfl: 2    donepezil (ARICEPT) 10 MG tablet, Take 1 tablet by mouth nightly, Disp: 90 tablet, Rfl: 3    blood glucose test strips (ONETOUCH VERIO) strip, USE TO CHECK BLOOD SUGAR DAILY.   SWITCH TO WHATEVER BRAND IS COVERED BY INSURANCE, Disp: 100 each, Rfl: 3    Lancets 30G MISC, 1 each Pt. On [place]. Administered approx 2 days ago. Discussed possibility of decreased gastric emptying and associated risk of aspiration. Discussed importance of contacting medical providers in future for instruction re: the above for any sedation / anesthesia. Pt appears to understand and wishes to proceed.   by Does not apply route daily Pt uses Clear Choice Glucose Meter., Disp: 100 each, Rfl: 0    glucose monitoring kit (FREESTYLE) monitoring kit, 1 kit by Does not apply route daily Check sugars once a day, Disp: 1 kit, Rfl: 0    hydrocortisone 2.5 % ointment, APPLY TOPICALLY 2 TIMES DAILY, Disp: 454 g, Rfl: 0    Blood Pressure Monitor LARY, Use daily to check blood pressure, Disp: 1 Device, Rfl: 0    Glucose Blood (GLUCOSE METER TEST VI), 1 each by In Vitro route daily, Disp: , Rfl:     Calcium Citrate-Vitamin D (CALCET CREAMY BITES) 500-400 MG-UNIT CHEW tablet, Take 1 tablet by mouth daily, Disp: , Rfl:     vitamin D (CHOLECALCIFEROL) 1000 UNIT TABS tablet, Take 1,000 Units by mouth daily, Disp: , Rfl:     vitamin B-12 (CYANOCOBALAMIN) 50 MCG tablet, Take 50 mcg by mouth daily, Disp: , Rfl:     ferrous gluconate (FERGON) 240 (27 Fe) MG tablet, Take 240 mg by mouth 3 times daily (with meals), Disp: , Rfl:     aspirin 81 MG EC tablet, Take 81 mg by mouth daily Stopped 1 week pre op, Disp: , Rfl:     Allergies:  No Known Allergies    Vitals:    02/08/22 1402   Weight: 253 lb (114.8 kg)   Height: 5' 11\" (1.803 m)       Exam:  Neurovascular status unchanged. Tenderness noted to palpation into the posterior right heel rim. Tenderness noted into the plantar fascial region right heel. No edema or ecchymotic skin changes present right lower extremity. No plantar calluses or heel fissuring noted right foot. Diagnostic Studies:     No results found. Procedures:    None    Plan Per Assessment  Benjamin May was seen today for foot pain. Diagnoses and all orders for this visit:    Plantar fasciitis    Right foot pain    Difficulty walking      1. Evaluation and management  2. We did discuss potentially proceeding with outpatient shockwave therapy in regards to the recalcitrant plantar fascial issues right lower extremity.   3. We did discuss continued stretching and strengthening exercises, good supportive shoe gear with custom insoles, and continued daily activities. 4. Patient will be followed up to discuss potential outpatient procedure once insurance verification is performed. Seen By:    Ritchie New DPM    Electronically signed by Ritchie New DPM on 2/8/2022 at 2:43 PM    This note was created using voice recognition software. The note was reviewed however may contain grammatical errors.

## 2024-04-03 ENCOUNTER — HOSPITAL ENCOUNTER (OUTPATIENT)
Dept: SLEEP CENTER | Age: 74
Discharge: HOME | End: 2024-04-03
Payer: COMMERCIAL

## 2024-04-03 DIAGNOSIS — G47.33 OSA (OBSTRUCTIVE SLEEP APNEA): Primary | ICD-10-CM

## 2024-04-03 PROCEDURE — 95810 POLYSOM 6/> YRS 4/> PARAM: CPT

## 2024-04-20 PROBLEM — N39.0 UTI (URINARY TRACT INFECTION): Status: RESOLVED | Noted: 2024-03-21 | Resolved: 2024-04-20

## 2024-04-25 ENCOUNTER — HOSPITAL ENCOUNTER (OUTPATIENT)
Dept: ULTRASOUND IMAGING | Age: 74
Discharge: HOME | End: 2024-04-25
Payer: COMMERCIAL

## 2024-04-25 DIAGNOSIS — N13.2 HYDRONEPHROSIS WITH RENAL AND URETERAL CALCULUS OBSTRUCTION: ICD-10-CM

## 2024-04-25 PROCEDURE — 76770 US EXAM ABDO BACK WALL COMP: CPT

## 2024-06-03 ENCOUNTER — HOSPITAL ENCOUNTER (OUTPATIENT)
Dept: ULTRASOUND IMAGING | Age: 74
Discharge: HOME | End: 2024-06-03
Payer: COMMERCIAL

## 2024-06-03 DIAGNOSIS — N13.2 HYDRONEPHROSIS WITH RENAL AND URETERAL CALCULUS OBSTRUCTION: ICD-10-CM

## 2024-06-03 PROCEDURE — 76770 US EXAM ABDO BACK WALL COMP: CPT

## 2024-07-02 ENCOUNTER — APPOINTMENT (OUTPATIENT)
Dept: GENERAL RADIOLOGY | Age: 74
DRG: 481 | End: 2024-07-02
Payer: COMMERCIAL

## 2024-07-02 ENCOUNTER — HOSPITAL ENCOUNTER (INPATIENT)
Age: 74
LOS: 6 days | Discharge: NURSING, REHAB OR LONG-TERM CARE | DRG: 481 | End: 2024-07-08
Attending: EMERGENCY MEDICINE | Admitting: INTERNAL MEDICINE
Payer: COMMERCIAL

## 2024-07-02 DIAGNOSIS — E11.9 TYPE 2 DIABETES MELLITUS WITHOUT COMPLICATION, WITHOUT LONG-TERM CURRENT USE OF INSULIN (HCC): ICD-10-CM

## 2024-07-02 DIAGNOSIS — S72.492B: ICD-10-CM

## 2024-07-02 DIAGNOSIS — Z01.811 PRE-OP CHEST EXAM: Primary | ICD-10-CM

## 2024-07-02 PROBLEM — S72.90XA FEMUR FRACTURE (HCC): Status: ACTIVE | Noted: 2024-07-02

## 2024-07-02 LAB
ABO + RH BLD: NORMAL
ALBUMIN SERPL-MCNC: 3.9 G/DL (ref 3.5–5.2)
ALP SERPL-CCNC: 116 U/L (ref 40–129)
ALT SERPL-CCNC: 17 U/L (ref 0–40)
ANION GAP SERPL CALCULATED.3IONS-SCNC: 14 MMOL/L (ref 7–16)
ARM BAND NUMBER: NORMAL
AST SERPL-CCNC: 19 U/L (ref 0–39)
BILIRUB SERPL-MCNC: 0.2 MG/DL (ref 0–1.2)
BLOOD BANK SAMPLE EXPIRATION: NORMAL
BLOOD GROUP ANTIBODIES SERPL: NEGATIVE
BUN SERPL-MCNC: 11 MG/DL (ref 6–23)
CALCIUM SERPL-MCNC: 8.8 MG/DL (ref 8.6–10.2)
CHLORIDE SERPL-SCNC: 99 MMOL/L (ref 98–107)
CO2 SERPL-SCNC: 21 MMOL/L (ref 22–29)
CREAT SERPL-MCNC: 1 MG/DL (ref 0.7–1.2)
ERYTHROCYTE [DISTWIDTH] IN BLOOD BY AUTOMATED COUNT: 16.7 % (ref 11.5–15)
GFR, ESTIMATED: 81 ML/MIN/1.73M2
GLUCOSE SERPL-MCNC: 143 MG/DL (ref 74–99)
HCT VFR BLD AUTO: 36 % (ref 37–54)
HGB BLD-MCNC: 11.8 G/DL (ref 12.5–16.5)
INR PPP: 1.1
MAGNESIUM SERPL-MCNC: 1.9 MG/DL (ref 1.6–2.6)
MCH RBC QN AUTO: 29.1 PG (ref 26–35)
MCHC RBC AUTO-ENTMCNC: 32.8 G/DL (ref 32–34.5)
MCV RBC AUTO: 88.9 FL (ref 80–99.9)
PHOSPHATE SERPL-MCNC: 3.8 MG/DL (ref 2.5–4.5)
PLATELET # BLD AUTO: 278 K/UL (ref 130–450)
PMV BLD AUTO: 12.1 FL (ref 7–12)
POTASSIUM SERPL-SCNC: 4.6 MMOL/L (ref 3.5–5)
PROT SERPL-MCNC: 7.1 G/DL (ref 6.4–8.3)
PROTHROMBIN TIME: 11.7 SEC (ref 9.3–12.4)
RBC # BLD AUTO: 4.05 M/UL (ref 3.8–5.8)
SODIUM SERPL-SCNC: 134 MMOL/L (ref 132–146)
WBC OTHER # BLD: 19 K/UL (ref 4.5–11.5)

## 2024-07-02 PROCEDURE — 85027 COMPLETE CBC AUTOMATED: CPT

## 2024-07-02 PROCEDURE — 86901 BLOOD TYPING SEROLOGIC RH(D): CPT

## 2024-07-02 PROCEDURE — 85610 PROTHROMBIN TIME: CPT

## 2024-07-02 PROCEDURE — 84100 ASSAY OF PHOSPHORUS: CPT

## 2024-07-02 PROCEDURE — 6360000002 HC RX W HCPCS

## 2024-07-02 PROCEDURE — 2580000003 HC RX 258: Performed by: INTERNAL MEDICINE

## 2024-07-02 PROCEDURE — 80053 COMPREHEN METABOLIC PANEL: CPT

## 2024-07-02 PROCEDURE — 2500000003 HC RX 250 WO HCPCS

## 2024-07-02 PROCEDURE — 96374 THER/PROPH/DIAG INJ IV PUSH: CPT

## 2024-07-02 PROCEDURE — 73552 X-RAY EXAM OF FEMUR 2/>: CPT

## 2024-07-02 PROCEDURE — 1200000000 HC SEMI PRIVATE

## 2024-07-02 PROCEDURE — 90714 TD VACC NO PRESV 7 YRS+ IM: CPT

## 2024-07-02 PROCEDURE — 90471 IMMUNIZATION ADMIN: CPT

## 2024-07-02 PROCEDURE — 93005 ELECTROCARDIOGRAM TRACING: CPT | Performed by: INTERNAL MEDICINE

## 2024-07-02 PROCEDURE — 99285 EMERGENCY DEPT VISIT HI MDM: CPT

## 2024-07-02 PROCEDURE — 86850 RBC ANTIBODY SCREEN: CPT

## 2024-07-02 PROCEDURE — 6370000000 HC RX 637 (ALT 250 FOR IP)

## 2024-07-02 PROCEDURE — 86900 BLOOD TYPING SEROLOGIC ABO: CPT

## 2024-07-02 PROCEDURE — 83735 ASSAY OF MAGNESIUM: CPT

## 2024-07-02 PROCEDURE — 6370000000 HC RX 637 (ALT 250 FOR IP): Performed by: INTERNAL MEDICINE

## 2024-07-02 PROCEDURE — 2580000003 HC RX 258

## 2024-07-02 PROCEDURE — 71045 X-RAY EXAM CHEST 1 VIEW: CPT

## 2024-07-02 PROCEDURE — 73560 X-RAY EXAM OF KNEE 1 OR 2: CPT

## 2024-07-02 RX ORDER — MAGNESIUM SULFATE IN WATER 40 MG/ML
2000 INJECTION, SOLUTION INTRAVENOUS PRN
Status: DISCONTINUED | OUTPATIENT
Start: 2024-07-02 | End: 2024-07-08 | Stop reason: HOSPADM

## 2024-07-02 RX ORDER — AMLODIPINE BESYLATE 10 MG/1
10 TABLET ORAL DAILY
Status: DISCONTINUED | OUTPATIENT
Start: 2024-07-03 | End: 2024-07-08 | Stop reason: HOSPADM

## 2024-07-02 RX ORDER — LEVOTHYROXINE SODIUM 0.1 MG/1
100 TABLET ORAL EVERY MORNING
Status: DISCONTINUED | OUTPATIENT
Start: 2024-07-03 | End: 2024-07-08 | Stop reason: HOSPADM

## 2024-07-02 RX ORDER — PROCHLORPERAZINE EDISYLATE 5 MG/ML
5 INJECTION INTRAMUSCULAR; INTRAVENOUS EVERY 8 HOURS PRN
Status: DISCONTINUED | OUTPATIENT
Start: 2024-07-02 | End: 2024-07-08 | Stop reason: HOSPADM

## 2024-07-02 RX ORDER — TAMSULOSIN HYDROCHLORIDE 0.4 MG/1
0.4 CAPSULE ORAL NIGHTLY
Status: DISCONTINUED | OUTPATIENT
Start: 2024-07-02 | End: 2024-07-08 | Stop reason: HOSPADM

## 2024-07-02 RX ORDER — MORPHINE SULFATE 4 MG/ML
4 INJECTION, SOLUTION INTRAMUSCULAR; INTRAVENOUS
Status: DISCONTINUED | OUTPATIENT
Start: 2024-07-02 | End: 2024-07-08 | Stop reason: HOSPADM

## 2024-07-02 RX ORDER — ASPIRIN 81 MG/1
81 TABLET ORAL DAILY
Status: ON HOLD | COMMUNITY
End: 2024-07-03 | Stop reason: HOSPADM

## 2024-07-02 RX ORDER — ACETAMINOPHEN 325 MG/1
650 TABLET ORAL EVERY 6 HOURS PRN
Status: DISCONTINUED | OUTPATIENT
Start: 2024-07-02 | End: 2024-07-02

## 2024-07-02 RX ORDER — DEXTROSE MONOHYDRATE 100 MG/ML
INJECTION, SOLUTION INTRAVENOUS CONTINUOUS PRN
Status: DISCONTINUED | OUTPATIENT
Start: 2024-07-02 | End: 2024-07-08 | Stop reason: HOSPADM

## 2024-07-02 RX ORDER — ACETAMINOPHEN 650 MG/1
650 SUPPOSITORY RECTAL EVERY 6 HOURS PRN
Status: DISCONTINUED | OUTPATIENT
Start: 2024-07-02 | End: 2024-07-02

## 2024-07-02 RX ORDER — GLUCAGON 1 MG/ML
1 KIT INJECTION PRN
Status: DISCONTINUED | OUTPATIENT
Start: 2024-07-02 | End: 2024-07-08 | Stop reason: HOSPADM

## 2024-07-02 RX ORDER — FERROUS SULFATE 325(65) MG
325 TABLET, DELAYED RELEASE (ENTERIC COATED) ORAL DAILY
COMMUNITY

## 2024-07-02 RX ORDER — FLUOXETINE HYDROCHLORIDE 20 MG/1
40 CAPSULE ORAL DAILY
Status: DISCONTINUED | OUTPATIENT
Start: 2024-07-03 | End: 2024-07-08 | Stop reason: HOSPADM

## 2024-07-02 RX ORDER — MORPHINE SULFATE 2 MG/ML
2 INJECTION, SOLUTION INTRAMUSCULAR; INTRAVENOUS
Status: DISCONTINUED | OUTPATIENT
Start: 2024-07-02 | End: 2024-07-08 | Stop reason: HOSPADM

## 2024-07-02 RX ORDER — SODIUM CHLORIDE 0.9 % (FLUSH) 0.9 %
5-40 SYRINGE (ML) INJECTION EVERY 12 HOURS SCHEDULED
Status: DISCONTINUED | OUTPATIENT
Start: 2024-07-02 | End: 2024-07-08 | Stop reason: HOSPADM

## 2024-07-02 RX ORDER — ATORVASTATIN CALCIUM 20 MG/1
20 TABLET, FILM COATED ORAL DAILY
COMMUNITY

## 2024-07-02 RX ORDER — DONEPEZIL HYDROCHLORIDE 10 MG/1
10 TABLET, FILM COATED ORAL
COMMUNITY

## 2024-07-02 RX ORDER — OXYCODONE HYDROCHLORIDE AND ACETAMINOPHEN 5; 325 MG/1; MG/1
1 TABLET ORAL EVERY 8 HOURS PRN
Status: DISCONTINUED | OUTPATIENT
Start: 2024-07-02 | End: 2024-07-08 | Stop reason: HOSPADM

## 2024-07-02 RX ORDER — LISINOPRIL 5 MG/1
2.5 TABLET ORAL DAILY
COMMUNITY

## 2024-07-02 RX ORDER — METFORMIN HYDROCHLORIDE 500 MG/1
1000 TABLET, EXTENDED RELEASE ORAL 2 TIMES DAILY
COMMUNITY

## 2024-07-02 RX ORDER — POTASSIUM CHLORIDE 20 MEQ/1
40 TABLET, EXTENDED RELEASE ORAL PRN
Status: DISCONTINUED | OUTPATIENT
Start: 2024-07-02 | End: 2024-07-08 | Stop reason: HOSPADM

## 2024-07-02 RX ORDER — LIDOCAINE HYDROCHLORIDE 10 MG/ML
5 INJECTION, SOLUTION INFILTRATION; PERINEURAL ONCE
Status: COMPLETED | OUTPATIENT
Start: 2024-07-02 | End: 2024-07-02

## 2024-07-02 RX ORDER — ONDANSETRON 2 MG/ML
4 INJECTION INTRAMUSCULAR; INTRAVENOUS EVERY 6 HOURS PRN
Status: DISCONTINUED | OUTPATIENT
Start: 2024-07-02 | End: 2024-07-08 | Stop reason: HOSPADM

## 2024-07-02 RX ORDER — SODIUM CHLORIDE 9 MG/ML
INJECTION, SOLUTION INTRAVENOUS PRN
Status: DISCONTINUED | OUTPATIENT
Start: 2024-07-02 | End: 2024-07-08 | Stop reason: HOSPADM

## 2024-07-02 RX ORDER — GLIPIZIDE 5 MG/1
5 TABLET ORAL
COMMUNITY

## 2024-07-02 RX ORDER — OXYCODONE HYDROCHLORIDE 5 MG/1
5 TABLET ORAL EVERY 4 HOURS PRN
Status: DISCONTINUED | OUTPATIENT
Start: 2024-07-02 | End: 2024-07-08 | Stop reason: HOSPADM

## 2024-07-02 RX ORDER — DONEPEZIL HYDROCHLORIDE 5 MG/1
10 TABLET, FILM COATED ORAL NIGHTLY
Status: DISCONTINUED | OUTPATIENT
Start: 2024-07-02 | End: 2024-07-08 | Stop reason: HOSPADM

## 2024-07-02 RX ORDER — POLYETHYLENE GLYCOL 3350 17 G/17G
17 POWDER, FOR SOLUTION ORAL DAILY PRN
Status: DISCONTINUED | OUTPATIENT
Start: 2024-07-02 | End: 2024-07-08 | Stop reason: HOSPADM

## 2024-07-02 RX ORDER — LEVOTHYROXINE SODIUM 0.1 MG/1
50 TABLET ORAL WEEKLY
COMMUNITY

## 2024-07-02 RX ORDER — HYDROCODONE BITARTRATE AND ACETAMINOPHEN 5; 325 MG/1; MG/1
1 TABLET ORAL ONCE
Status: COMPLETED | OUTPATIENT
Start: 2024-07-02 | End: 2024-07-02

## 2024-07-02 RX ORDER — 0.9 % SODIUM CHLORIDE 0.9 %
1000 INTRAVENOUS SOLUTION INTRAVENOUS ONCE
Status: COMPLETED | OUTPATIENT
Start: 2024-07-02 | End: 2024-07-02

## 2024-07-02 RX ORDER — SODIUM CHLORIDE 0.9 % (FLUSH) 0.9 %
5-40 SYRINGE (ML) INJECTION PRN
Status: DISCONTINUED | OUTPATIENT
Start: 2024-07-02 | End: 2024-07-08 | Stop reason: HOSPADM

## 2024-07-02 RX ORDER — ASPIRIN 81 MG/1
81 TABLET ORAL DAILY
Status: DISCONTINUED | OUTPATIENT
Start: 2024-07-03 | End: 2024-07-07

## 2024-07-02 RX ORDER — ATORVASTATIN CALCIUM 20 MG/1
20 TABLET, FILM COATED ORAL NIGHTLY
Status: DISCONTINUED | OUTPATIENT
Start: 2024-07-02 | End: 2024-07-08 | Stop reason: HOSPADM

## 2024-07-02 RX ORDER — ONDANSETRON 4 MG/1
4 TABLET, ORALLY DISINTEGRATING ORAL EVERY 8 HOURS PRN
Status: DISCONTINUED | OUTPATIENT
Start: 2024-07-02 | End: 2024-07-08 | Stop reason: HOSPADM

## 2024-07-02 RX ORDER — OXYCODONE HYDROCHLORIDE 5 MG/1
10 TABLET ORAL EVERY 4 HOURS PRN
Status: DISCONTINUED | OUTPATIENT
Start: 2024-07-02 | End: 2024-07-08 | Stop reason: HOSPADM

## 2024-07-02 RX ORDER — ACETAMINOPHEN 325 MG/1
650 TABLET ORAL EVERY 4 HOURS PRN
Status: DISCONTINUED | OUTPATIENT
Start: 2024-07-02 | End: 2024-07-08 | Stop reason: HOSPADM

## 2024-07-02 RX ORDER — POTASSIUM CHLORIDE 7.45 MG/ML
10 INJECTION INTRAVENOUS PRN
Status: DISCONTINUED | OUTPATIENT
Start: 2024-07-02 | End: 2024-07-08 | Stop reason: HOSPADM

## 2024-07-02 RX ADMIN — SODIUM CHLORIDE 1000 ML: 9 INJECTION, SOLUTION INTRAVENOUS at 18:50

## 2024-07-02 RX ADMIN — LIDOCAINE HYDROCHLORIDE 5 ML: 10 INJECTION, SOLUTION INFILTRATION; PERINEURAL at 15:32

## 2024-07-02 RX ADMIN — TAMSULOSIN HYDROCHLORIDE 0.4 MG: 0.4 CAPSULE ORAL at 20:38

## 2024-07-02 RX ADMIN — DONEPEZIL HYDROCHLORIDE 10 MG: 5 TABLET, FILM COATED ORAL at 20:38

## 2024-07-02 RX ADMIN — HYDROCODONE BITARTRATE AND ACETAMINOPHEN 1 TABLET: 5; 325 TABLET ORAL at 15:31

## 2024-07-02 RX ADMIN — ATORVASTATIN CALCIUM 20 MG: 20 TABLET, FILM COATED ORAL at 20:38

## 2024-07-02 RX ADMIN — OXYCODONE HYDROCHLORIDE AND ACETAMINOPHEN 1 TABLET: 5; 325 TABLET ORAL at 23:25

## 2024-07-02 RX ADMIN — CLOSTRIDIUM TETANI TOXOID ANTIGEN (FORMALDEHYDE INACTIVATED) AND CORYNEBACTERIUM DIPHTHERIAE TOXOID ANTIGEN (FORMALDEHYDE INACTIVATED) 0.5 ML: 5; 2 INJECTION, SUSPENSION INTRAMUSCULAR at 15:33

## 2024-07-02 RX ADMIN — PIPERACILLIN AND TAZOBACTAM 4500 MG: 4; .5 INJECTION, POWDER, LYOPHILIZED, FOR SOLUTION INTRAVENOUS at 17:45

## 2024-07-02 ASSESSMENT — PAIN DESCRIPTION - ORIENTATION: ORIENTATION: RIGHT

## 2024-07-02 ASSESSMENT — PAIN SCALES - GENERAL
PAINLEVEL_OUTOF10: 7
PAINLEVEL_OUTOF10: 0
PAINLEVEL_OUTOF10: 7

## 2024-07-02 ASSESSMENT — PAIN DESCRIPTION - LOCATION: LOCATION: HIP

## 2024-07-02 ASSESSMENT — PAIN DESCRIPTION - DESCRIPTORS: DESCRIPTORS: JABBING;STABBING;PRESSURE

## 2024-07-02 NOTE — CONSULTS
Department of Orthopedic Surgery  Resident Consult Note    Reason for Consult: Right femur fracture    HISTORY OF PRESENT ILLNESS:       Patient is a 73 y.o. male who presents with right leg pain after sustaining fall from standing.  Patient reports he was at [employer] earlier this evening when he lost his balance landing on his right knee.  Patient has dementia per his wife who is accompanying him today.  Patient reports difficulty with ambulation since fall.  Reports Dr. Harden did right total knee arthroplasty in 2011.  Patient has been having no issues with total arthroplasty until fall today.  Denies distal numbness/tingling/paresthesias, denies any other orthopedic complaints.    Past Medical History:        Diagnosis Date    CAD (coronary artery disease)     Dementia ([facility])     early stages  just short term memory loss currently    DM (diabetes mellitus) ([facility])     Hx of cardiovascular stress test 06/16/2020    Hyperlipidemia     Hypertension     Moderate episode of recurrent major depressive disorder ([facility]) 05/17/2018    Myocardial infarction ([facility]) 1995    slight mild    Osteoarthritis     Sleep apnea     does have machine- uses occasionaly     Past Surgical History:        Procedure Laterality Date    ANTERIOR CRUCIATE LIGAMENT REPAIR  2002 left knee    COLONOSCOPY      FOOT SURGERY Right 4/21/2022    HIGH ENERGY EXTRACORPEAL SHOCKWAVE THERAPY RIGHT FOOT performed by Jair Cochran Jr., DPM at [facility] OR    HEEL SPUR SURGERY Right 2/18/2021    RESECTION EXOSTOSIS RIGHT FOOT, REPAIR ACHILLES TENDON RIGHT (CPT 66082) ([facility]X) performed by Jair Cochran Jr., DPM at [facility] OR    INGUINAL HERNIA REPAIR  1991 & 1996    JOINT REPLACEMENT  2011 right    right knee    OTHER SURGICAL HISTORY  02/18/2021    resection retrocalcaneal exostosia right foot, repair achilles tendon right foot    PENILE PROSTHESIS      CHET-EN-Y GASTRIC BYPASS  12/27/2004 Dr. Triplett    SPLENECTOMY  12/27/2004 Dr. Triplett

## 2024-07-02 NOTE — ED PROVIDER NOTES
[facility]  EMERGENCY DEPARTMENT ENCOUNTER        Pt Name: Robby Phan  MRN: [id number]  Birthdate 1950  Date of evaluation: 7/2/2024  Provider: Alexandre Ortiz MD  PCP: William Rao APRN - CNP  Note Started: 3:00 PM EDT 7/2/24    CHIEF COMPLAINT       Chief Complaint   Patient presents with    Fall     Tripped at [employer] [place], injuring right knee abrasion, hx right knee replacement same kne in 2012-reports several alcoholic beverages prior to falling-unable to bear weight.     HISTORY OF PRESENT ILLNESS: 1 or more Elements   History From: Patient    Limitations to history : None    Robby Phan is a 73 y.o. male with past medical history of hypertension, depression, type II additional eyes, dementia, GILDARDO, difficulty ambulating, myocardial infarction, CAD, hyperlipidemia who presents status post mechanical fall that occurred prior to arrival.  Patient states he was seen in the parking lot and tripped over his feet and fell and denies striking head or any loss conscious at this time.  Patient denies any dizziness or lightheadedness prior to the fall.  Patient states he fell onto his right knee complains of pain with wound to the right knee.  Patient denies any neck pain or neck stiffness, back pain, numbness or tingling in extremities, dizziness or lightheadedness, headache, blurry vision chest pain, shortness of breath, abdominal pain, nausea, vomiting, diarrhea.  Patient denies any tobacco, EtOH, illicit drug use.  Of note, patient denies any anticoagulation at this time.    Nursing Notes were all reviewed and agreed with or any disagreements were addressed in the HPI.    ROS:   Pertinent positives and negatives are stated within HPI, all other systems reviewed and are negative.    --------------------------------------------- PAST HISTORY ---------------------------------------------  Past Medical History:  has a past medical history of CAD (coronary artery disease), Dementia (HCC), DM (diabetes

## 2024-07-03 ENCOUNTER — ANESTHESIA EVENT (OUTPATIENT)
Dept: OPERATING ROOM | Age: 74
End: 2024-07-03
Payer: COMMERCIAL

## 2024-07-03 ENCOUNTER — ANESTHESIA (OUTPATIENT)
Dept: OPERATING ROOM | Age: 74
End: 2024-07-03
Payer: COMMERCIAL

## 2024-07-03 ENCOUNTER — APPOINTMENT (OUTPATIENT)
Dept: GENERAL RADIOLOGY | Age: 74
DRG: 481 | End: 2024-07-03
Payer: COMMERCIAL

## 2024-07-03 LAB
ALBUMIN SERPL-MCNC: 3.6 G/DL (ref 3.5–5.2)
ALP SERPL-CCNC: 106 U/L (ref 40–129)
ALT SERPL-CCNC: 15 U/L (ref 0–40)
ANION GAP SERPL CALCULATED.3IONS-SCNC: 13 MMOL/L (ref 7–16)
AST SERPL-CCNC: 20 U/L (ref 0–39)
BACTERIA URNS QL MICRO: ABNORMAL
BASOPHILS # BLD: 0.05 K/UL (ref 0–0.2)
BASOPHILS NFR BLD: 1 % (ref 0–2)
BILIRUB SERPL-MCNC: 0.4 MG/DL (ref 0–1.2)
BILIRUB UR QL STRIP: NEGATIVE
BUN SERPL-MCNC: 13 MG/DL (ref 6–23)
CALCIUM SERPL-MCNC: 8.1 MG/DL (ref 8.6–10.2)
CHLORIDE SERPL-SCNC: 101 MMOL/L (ref 98–107)
CLARITY UR: CLEAR
CO2 SERPL-SCNC: 23 MMOL/L (ref 22–29)
COLOR UR: YELLOW
CREAT SERPL-MCNC: 1 MG/DL (ref 0.7–1.2)
EKG ATRIAL RATE: 68 BPM
EKG P AXIS: 28 DEGREES
EKG P-R INTERVAL: 166 MS
EKG Q-T INTERVAL: 440 MS
EKG QRS DURATION: 94 MS
EKG QTC CALCULATION (BAZETT): 467 MS
EKG R AXIS: 19 DEGREES
EKG T AXIS: 31 DEGREES
EKG VENTRICULAR RATE: 68 BPM
EOSINOPHIL # BLD: 0.02 K/UL (ref 0.05–0.5)
EOSINOPHILS RELATIVE PERCENT: 0 % (ref 0–6)
ERYTHROCYTE [DISTWIDTH] IN BLOOD BY AUTOMATED COUNT: 16.8 % (ref 11.5–15)
GFR, ESTIMATED: 82 ML/MIN/1.73M2
GLUCOSE BLD-MCNC: 136 MG/DL (ref 74–99)
GLUCOSE BLD-MCNC: 254 MG/DL (ref 74–99)
GLUCOSE SERPL-MCNC: 174 MG/DL (ref 74–99)
GLUCOSE UR STRIP-MCNC: 250 MG/DL
HCT VFR BLD AUTO: 28.9 % (ref 37–54)
HCT VFR BLD AUTO: 30.8 % (ref 37–54)
HGB BLD-MCNC: 10.3 G/DL (ref 12.5–16.5)
HGB BLD-MCNC: 9.1 G/DL (ref 12.5–16.5)
HGB UR QL STRIP.AUTO: NEGATIVE
IMM GRANULOCYTES # BLD AUTO: 0.04 K/UL (ref 0–0.58)
IMM GRANULOCYTES NFR BLD: 0 % (ref 0–5)
KETONES UR STRIP-MCNC: 15 MG/DL
LEUKOCYTE ESTERASE UR QL STRIP: NEGATIVE
LYMPHOCYTES NFR BLD: 2.35 K/UL (ref 1.5–4)
LYMPHOCYTES RELATIVE PERCENT: 23 % (ref 20–42)
MCH RBC QN AUTO: 29.9 PG (ref 26–35)
MCHC RBC AUTO-ENTMCNC: 33.4 G/DL (ref 32–34.5)
MCV RBC AUTO: 89.3 FL (ref 80–99.9)
MONOCYTES NFR BLD: 1.17 K/UL (ref 0.1–0.95)
MONOCYTES NFR BLD: 11 % (ref 2–12)
NEUTROPHILS NFR BLD: 65 % (ref 43–80)
NEUTS SEG NFR BLD: 6.59 K/UL (ref 1.8–7.3)
NITRITE UR QL STRIP: NEGATIVE
PH UR STRIP: 5.5 [PH] (ref 5–9)
PLATELET # BLD AUTO: 251 K/UL (ref 130–450)
PMV BLD AUTO: 12.8 FL (ref 7–12)
POTASSIUM SERPL-SCNC: 4.9 MMOL/L (ref 3.5–5)
PROT SERPL-MCNC: 6.3 G/DL (ref 6.4–8.3)
PROT UR STRIP-MCNC: NEGATIVE MG/DL
RBC # BLD AUTO: 3.45 M/UL (ref 3.8–5.8)
RBC #/AREA URNS HPF: ABNORMAL /HPF
SODIUM SERPL-SCNC: 137 MMOL/L (ref 132–146)
SP GR UR STRIP: >1.03 (ref 1–1.03)
T4 FREE SERPL-MCNC: 1.3 NG/DL (ref 0.9–1.7)
TSH SERPL DL<=0.05 MIU/L-ACNC: 1.54 UIU/ML (ref 0.27–4.2)
UROBILINOGEN UR STRIP-ACNC: 0.2 EU/DL (ref 0–1)
WBC #/AREA URNS HPF: ABNORMAL /HPF
WBC OTHER # BLD: 10.2 K/UL (ref 4.5–11.5)

## 2024-07-03 PROCEDURE — 93005 ELECTROCARDIOGRAM TRACING: CPT | Performed by: ANESTHESIOLOGY

## 2024-07-03 PROCEDURE — 7100000001 HC PACU RECOVERY - ADDTL 15 MIN: Performed by: ORTHOPAEDIC SURGERY

## 2024-07-03 PROCEDURE — 2580000003 HC RX 258: Performed by: SPECIALIST/TECHNOLOGIST

## 2024-07-03 PROCEDURE — 2709999900 HC NON-CHARGEABLE SUPPLY: Performed by: ORTHOPAEDIC SURGERY

## 2024-07-03 PROCEDURE — 2500000003 HC RX 250 WO HCPCS

## 2024-07-03 PROCEDURE — 6370000000 HC RX 637 (ALT 250 FOR IP): Performed by: ORTHOPAEDIC SURGERY

## 2024-07-03 PROCEDURE — 2580000003 HC RX 258: Performed by: ORTHOPAEDIC SURGERY

## 2024-07-03 PROCEDURE — 6360000002 HC RX W HCPCS: Performed by: NURSE ANESTHETIST, CERTIFIED REGISTERED

## 2024-07-03 PROCEDURE — 85025 COMPLETE CBC W/AUTO DIFF WBC: CPT

## 2024-07-03 PROCEDURE — 93010 ELECTROCARDIOGRAM REPORT: CPT | Performed by: INTERNAL MEDICINE

## 2024-07-03 PROCEDURE — 6360000002 HC RX W HCPCS: Performed by: SPECIALIST/TECHNOLOGIST

## 2024-07-03 PROCEDURE — 6360000002 HC RX W HCPCS: Performed by: ORTHOPAEDIC SURGERY

## 2024-07-03 PROCEDURE — 84439 ASSAY OF FREE THYROXINE: CPT

## 2024-07-03 PROCEDURE — 2500000003 HC RX 250 WO HCPCS: Performed by: NURSE ANESTHETIST, CERTIFIED REGISTERED

## 2024-07-03 PROCEDURE — L3650 SO 8 ABD RESTRAINT PRE OTS: HCPCS | Performed by: ORTHOPAEDIC SURGERY

## 2024-07-03 PROCEDURE — 73552 X-RAY EXAM OF FEMUR 2/>: CPT

## 2024-07-03 PROCEDURE — 85018 HEMOGLOBIN: CPT

## 2024-07-03 PROCEDURE — C1713 ANCHOR/SCREW BN/BN,TIS/BN: HCPCS | Performed by: ORTHOPAEDIC SURGERY

## 2024-07-03 PROCEDURE — 3600000004 HC SURGERY LEVEL 4 BASE: Performed by: ORTHOPAEDIC SURGERY

## 2024-07-03 PROCEDURE — 6360000002 HC RX W HCPCS: Performed by: ANESTHESIOLOGY

## 2024-07-03 PROCEDURE — 82962 GLUCOSE BLOOD TEST: CPT

## 2024-07-03 PROCEDURE — 3700000000 HC ANESTHESIA ATTENDED CARE: Performed by: ORTHOPAEDIC SURGERY

## 2024-07-03 PROCEDURE — 1200000000 HC SEMI PRIVATE

## 2024-07-03 PROCEDURE — 80053 COMPREHEN METABOLIC PANEL: CPT

## 2024-07-03 PROCEDURE — 85014 HEMATOCRIT: CPT

## 2024-07-03 PROCEDURE — 7100000000 HC PACU RECOVERY - FIRST 15 MIN: Performed by: ORTHOPAEDIC SURGERY

## 2024-07-03 PROCEDURE — 3700000001 HC ADD 15 MINUTES (ANESTHESIA): Performed by: ORTHOPAEDIC SURGERY

## 2024-07-03 PROCEDURE — 0QSB06Z REPOSITION RIGHT LOWER FEMUR WITH INTRAMEDULLARY INTERNAL FIXATION DEVICE, OPEN APPROACH: ICD-10-PCS | Performed by: ORTHOPAEDIC SURGERY

## 2024-07-03 PROCEDURE — 6370000000 HC RX 637 (ALT 250 FOR IP): Performed by: ANESTHESIOLOGY

## 2024-07-03 PROCEDURE — 81001 URINALYSIS AUTO W/SCOPE: CPT

## 2024-07-03 PROCEDURE — 87040 BLOOD CULTURE FOR BACTERIA: CPT

## 2024-07-03 PROCEDURE — 3600000014 HC SURGERY LEVEL 4 ADDTL 15MIN: Performed by: ORTHOPAEDIC SURGERY

## 2024-07-03 PROCEDURE — 84443 ASSAY THYROID STIM HORMONE: CPT

## 2024-07-03 PROCEDURE — 6370000000 HC RX 637 (ALT 250 FOR IP): Performed by: INTERNAL MEDICINE

## 2024-07-03 PROCEDURE — 36415 COLL VENOUS BLD VENIPUNCTURE: CPT

## 2024-07-03 PROCEDURE — C1769 GUIDE WIRE: HCPCS | Performed by: ORTHOPAEDIC SURGERY

## 2024-07-03 PROCEDURE — 6370000000 HC RX 637 (ALT 250 FOR IP): Performed by: NURSE ANESTHETIST, CERTIFIED REGISTERED

## 2024-07-03 PROCEDURE — 2720000010 HC SURG SUPPLY STERILE: Performed by: ORTHOPAEDIC SURGERY

## 2024-07-03 PROCEDURE — 2580000003 HC RX 258: Performed by: NURSE ANESTHETIST, CERTIFIED REGISTERED

## 2024-07-03 DEVICE — ADVANCED LOCKING SCREW: Type: IMPLANTABLE DEVICE | Status: FUNCTIONAL

## 2024-07-03 DEVICE — LOCKING SCREW
Type: IMPLANTABLE DEVICE | Site: FEMUR | Status: FUNCTIONAL
Brand: T2 ALPHA

## 2024-07-03 DEVICE — ADVANCED LOCKING SCREW: Type: IMPLANTABLE DEVICE | Site: FEMUR | Status: FUNCTIONAL

## 2024-07-03 DEVICE — IMPLANTABLE DEVICE
Type: IMPLANTABLE DEVICE | Site: FEMUR | Status: FUNCTIONAL
Brand: T2

## 2024-07-03 RX ORDER — KETOROLAC TROMETHAMINE 15 MG/ML
15 INJECTION, SOLUTION INTRAMUSCULAR; INTRAVENOUS
Status: DISCONTINUED | OUTPATIENT
Start: 2024-07-03 | End: 2024-07-03 | Stop reason: HOSPADM

## 2024-07-03 RX ORDER — ROCURONIUM BROMIDE 10 MG/ML
INJECTION, SOLUTION INTRAVENOUS PRN
Status: DISCONTINUED | OUTPATIENT
Start: 2024-07-03 | End: 2024-07-03 | Stop reason: SDUPTHER

## 2024-07-03 RX ORDER — ASPIRIN 325 MG
325 TABLET, DELAYED RELEASE (ENTERIC COATED) ORAL DAILY
Status: DISCONTINUED | OUTPATIENT
Start: 2024-07-04 | End: 2024-07-08 | Stop reason: HOSPADM

## 2024-07-03 RX ORDER — GLYCOPYRROLATE 0.2 MG/ML
INJECTION INTRAMUSCULAR; INTRAVENOUS PRN
Status: DISCONTINUED | OUTPATIENT
Start: 2024-07-03 | End: 2024-07-03 | Stop reason: SDUPTHER

## 2024-07-03 RX ORDER — LIDOCAINE HYDROCHLORIDE 20 MG/ML
INJECTION, SOLUTION INTRAVENOUS PRN
Status: DISCONTINUED | OUTPATIENT
Start: 2024-07-03 | End: 2024-07-03 | Stop reason: SDUPTHER

## 2024-07-03 RX ORDER — VITAMIN B COMPLEX
1000 TABLET ORAL DAILY
Status: DISCONTINUED | OUTPATIENT
Start: 2024-07-03 | End: 2024-07-08 | Stop reason: HOSPADM

## 2024-07-03 RX ORDER — FERROUS SULFATE 325(65) MG
325 TABLET, DELAYED RELEASE (ENTERIC COATED) ORAL DAILY
Status: DISCONTINUED | OUTPATIENT
Start: 2024-07-03 | End: 2024-07-03 | Stop reason: CLARIF

## 2024-07-03 RX ORDER — METOPROLOL TARTRATE 1 MG/ML
1 INJECTION, SOLUTION INTRAVENOUS ONCE
Status: COMPLETED | OUTPATIENT
Start: 2024-07-03 | End: 2024-07-03

## 2024-07-03 RX ORDER — PROPOFOL 10 MG/ML
INJECTION, EMULSION INTRAVENOUS PRN
Status: DISCONTINUED | OUTPATIENT
Start: 2024-07-03 | End: 2024-07-03 | Stop reason: SDUPTHER

## 2024-07-03 RX ORDER — GLIPIZIDE 5 MG/1
5 TABLET ORAL
Status: DISCONTINUED | OUTPATIENT
Start: 2024-07-03 | End: 2024-07-07

## 2024-07-03 RX ORDER — SODIUM CHLORIDE 9 MG/ML
INJECTION, SOLUTION INTRAVENOUS CONTINUOUS PRN
Status: DISCONTINUED | OUTPATIENT
Start: 2024-07-03 | End: 2024-07-03 | Stop reason: SDUPTHER

## 2024-07-03 RX ORDER — NALOXONE HYDROCHLORIDE 0.4 MG/ML
INJECTION, SOLUTION INTRAMUSCULAR; INTRAVENOUS; SUBCUTANEOUS PRN
Status: DISCONTINUED | OUTPATIENT
Start: 2024-07-03 | End: 2024-07-03 | Stop reason: HOSPADM

## 2024-07-03 RX ORDER — LANOLIN ALCOHOL/MO/W.PET/CERES
500 CREAM (GRAM) TOPICAL DAILY
Status: DISCONTINUED | OUTPATIENT
Start: 2024-07-03 | End: 2024-07-08 | Stop reason: HOSPADM

## 2024-07-03 RX ORDER — MIDAZOLAM HYDROCHLORIDE 1 MG/ML
2 INJECTION INTRAMUSCULAR; INTRAVENOUS
Status: DISCONTINUED | OUTPATIENT
Start: 2024-07-03 | End: 2024-07-03 | Stop reason: HOSPADM

## 2024-07-03 RX ORDER — ASPIRIN 325 MG
325 TABLET ORAL DAILY
Qty: 30 TABLET | Refills: 3 | Status: SHIPPED | OUTPATIENT
Start: 2024-07-03

## 2024-07-03 RX ORDER — ONDANSETRON 2 MG/ML
INJECTION INTRAMUSCULAR; INTRAVENOUS PRN
Status: DISCONTINUED | OUTPATIENT
Start: 2024-07-03 | End: 2024-07-03 | Stop reason: SDUPTHER

## 2024-07-03 RX ORDER — METOPROLOL TARTRATE 1 MG/ML
INJECTION, SOLUTION INTRAVENOUS
Status: COMPLETED
Start: 2024-07-03 | End: 2024-07-03

## 2024-07-03 RX ORDER — DEXAMETHASONE SODIUM PHOSPHATE 10 MG/ML
INJECTION, SOLUTION INTRAMUSCULAR; INTRAVENOUS PRN
Status: DISCONTINUED | OUTPATIENT
Start: 2024-07-03 | End: 2024-07-03 | Stop reason: SDUPTHER

## 2024-07-03 RX ORDER — FERROUS SULFATE 325(65) MG
325 TABLET ORAL DAILY
Status: DISCONTINUED | OUTPATIENT
Start: 2024-07-03 | End: 2024-07-08 | Stop reason: HOSPADM

## 2024-07-03 RX ORDER — LABETALOL HYDROCHLORIDE 5 MG/ML
10 INJECTION, SOLUTION INTRAVENOUS
Status: DISCONTINUED | OUTPATIENT
Start: 2024-07-03 | End: 2024-07-03 | Stop reason: HOSPADM

## 2024-07-03 RX ORDER — VANCOMYCIN HYDROCHLORIDE 1 G/20ML
INJECTION, POWDER, LYOPHILIZED, FOR SOLUTION INTRAVENOUS PRN
Status: DISCONTINUED | OUTPATIENT
Start: 2024-07-03 | End: 2024-07-03 | Stop reason: ALTCHOICE

## 2024-07-03 RX ORDER — DIPHENHYDRAMINE HYDROCHLORIDE 50 MG/ML
12.5 INJECTION INTRAMUSCULAR; INTRAVENOUS
Status: DISCONTINUED | OUTPATIENT
Start: 2024-07-03 | End: 2024-07-03 | Stop reason: HOSPADM

## 2024-07-03 RX ORDER — ONDANSETRON 2 MG/ML
4 INJECTION INTRAMUSCULAR; INTRAVENOUS
Status: DISCONTINUED | OUTPATIENT
Start: 2024-07-03 | End: 2024-07-03 | Stop reason: HOSPADM

## 2024-07-03 RX ORDER — MORPHINE SULFATE 2 MG/ML
2 INJECTION, SOLUTION INTRAMUSCULAR; INTRAVENOUS EVERY 5 MIN PRN
Status: DISCONTINUED | OUTPATIENT
Start: 2024-07-03 | End: 2024-07-03 | Stop reason: HOSPADM

## 2024-07-03 RX ORDER — NEOSTIGMINE METHYLSULFATE 1 MG/ML
INJECTION, SOLUTION INTRAVENOUS PRN
Status: DISCONTINUED | OUTPATIENT
Start: 2024-07-03 | End: 2024-07-03 | Stop reason: SDUPTHER

## 2024-07-03 RX ORDER — ESMOLOL HYDROCHLORIDE 10 MG/ML
INJECTION INTRAVENOUS PRN
Status: DISCONTINUED | OUTPATIENT
Start: 2024-07-03 | End: 2024-07-03 | Stop reason: SDUPTHER

## 2024-07-03 RX ORDER — MEPERIDINE HYDROCHLORIDE 25 MG/ML
12.5 INJECTION INTRAMUSCULAR; INTRAVENOUS; SUBCUTANEOUS EVERY 5 MIN PRN
Status: DISCONTINUED | OUTPATIENT
Start: 2024-07-03 | End: 2024-07-03 | Stop reason: HOSPADM

## 2024-07-03 RX ORDER — PROCHLORPERAZINE EDISYLATE 5 MG/ML
5 INJECTION INTRAMUSCULAR; INTRAVENOUS
Status: DISCONTINUED | OUTPATIENT
Start: 2024-07-03 | End: 2024-07-03 | Stop reason: HOSPADM

## 2024-07-03 RX ORDER — HYDRALAZINE HYDROCHLORIDE 20 MG/ML
10 INJECTION INTRAMUSCULAR; INTRAVENOUS
Status: DISCONTINUED | OUTPATIENT
Start: 2024-07-03 | End: 2024-07-03 | Stop reason: HOSPADM

## 2024-07-03 RX ORDER — LISINOPRIL 5 MG/1
2.5 TABLET ORAL DAILY
Status: DISCONTINUED | OUTPATIENT
Start: 2024-07-03 | End: 2024-07-08 | Stop reason: HOSPADM

## 2024-07-03 RX ORDER — ALBUTEROL SULFATE 90 UG/1
AEROSOL, METERED RESPIRATORY (INHALATION) PRN
Status: DISCONTINUED | OUTPATIENT
Start: 2024-07-03 | End: 2024-07-03 | Stop reason: SDUPTHER

## 2024-07-03 RX ORDER — FENTANYL CITRATE 50 UG/ML
INJECTION, SOLUTION INTRAMUSCULAR; INTRAVENOUS PRN
Status: DISCONTINUED | OUTPATIENT
Start: 2024-07-03 | End: 2024-07-03 | Stop reason: SDUPTHER

## 2024-07-03 RX ORDER — IPRATROPIUM BROMIDE AND ALBUTEROL SULFATE 2.5; .5 MG/3ML; MG/3ML
1 SOLUTION RESPIRATORY (INHALATION)
Status: COMPLETED | OUTPATIENT
Start: 2024-07-03 | End: 2024-07-03

## 2024-07-03 RX ORDER — OXYCODONE HYDROCHLORIDE AND ACETAMINOPHEN 5; 325 MG/1; MG/1
1 TABLET ORAL EVERY 6 HOURS PRN
Qty: 28 TABLET | Refills: 0 | Status: SHIPPED | OUTPATIENT
Start: 2024-07-03 | End: 2024-07-10

## 2024-07-03 RX ADMIN — TAMSULOSIN HYDROCHLORIDE 0.4 MG: 0.4 CAPSULE ORAL at 21:25

## 2024-07-03 RX ADMIN — AMLODIPINE BESYLATE 10 MG: 10 TABLET ORAL at 08:16

## 2024-07-03 RX ADMIN — CEFAZOLIN 2000 MG: 2 INJECTION, POWDER, FOR SOLUTION INTRAMUSCULAR; INTRAVENOUS at 00:12

## 2024-07-03 RX ADMIN — SODIUM CHLORIDE: 9 INJECTION, SOLUTION INTRAVENOUS at 17:04

## 2024-07-03 RX ADMIN — GLYCOPYRROLATE 0.4 MG: 0.2 INJECTION, SOLUTION INTRAMUSCULAR; INTRAVENOUS at 18:36

## 2024-07-03 RX ADMIN — MORPHINE SULFATE 4 MG: 4 INJECTION, SOLUTION INTRAMUSCULAR; INTRAVENOUS at 08:17

## 2024-07-03 RX ADMIN — ESMOLOL HYDROCHLORIDE 20 MG: 10 INJECTION, SOLUTION INTRAVENOUS at 18:01

## 2024-07-03 RX ADMIN — METOPROLOL TARTRATE 1 MG: 5 INJECTION INTRAVENOUS at 19:55

## 2024-07-03 RX ADMIN — ROCURONIUM BROMIDE 30 MG: 10 SOLUTION INTRAVENOUS at 16:20

## 2024-07-03 RX ADMIN — HYDROMORPHONE HYDROCHLORIDE 0.5 MG: 1 INJECTION, SOLUTION INTRAMUSCULAR; INTRAVENOUS; SUBCUTANEOUS at 17:38

## 2024-07-03 RX ADMIN — CEFAZOLIN 2000 MG: 2 INJECTION, POWDER, FOR SOLUTION INTRAMUSCULAR; INTRAVENOUS at 08:15

## 2024-07-03 RX ADMIN — ATORVASTATIN CALCIUM 20 MG: 20 TABLET, FILM COATED ORAL at 21:26

## 2024-07-03 RX ADMIN — HYDROMORPHONE HYDROCHLORIDE 0.5 MG: 1 INJECTION, SOLUTION INTRAMUSCULAR; INTRAVENOUS; SUBCUTANEOUS at 17:03

## 2024-07-03 RX ADMIN — METOPROLOL TARTRATE 1 MG: 1 INJECTION, SOLUTION INTRAVENOUS at 19:55

## 2024-07-03 RX ADMIN — DEXAMETHASONE SODIUM PHOSPHATE 10 MG: 10 INJECTION, SOLUTION INTRAMUSCULAR; INTRAVENOUS at 16:30

## 2024-07-03 RX ADMIN — MORPHINE SULFATE 2 MG: 2 INJECTION, SOLUTION INTRAMUSCULAR; INTRAVENOUS at 19:25

## 2024-07-03 RX ADMIN — ALBUTEROL SULFATE 2 PUFF: 90 AEROSOL, METERED RESPIRATORY (INHALATION) at 18:32

## 2024-07-03 RX ADMIN — DONEPEZIL HYDROCHLORIDE 10 MG: 5 TABLET, FILM COATED ORAL at 21:25

## 2024-07-03 RX ADMIN — PHENYLEPHRINE HYDROCHLORIDE 100 MCG: 10 INJECTION INTRAVENOUS at 18:06

## 2024-07-03 RX ADMIN — Medication 3 MG: at 18:36

## 2024-07-03 RX ADMIN — LISINOPRIL 2.5 MG: 5 TABLET ORAL at 08:16

## 2024-07-03 RX ADMIN — FENTANYL CITRATE 100 MCG: 50 INJECTION, SOLUTION INTRAMUSCULAR; INTRAVENOUS at 16:18

## 2024-07-03 RX ADMIN — LIDOCAINE HYDROCHLORIDE 80 MG: 20 INJECTION, SOLUTION INTRAVENOUS at 16:18

## 2024-07-03 RX ADMIN — CEFAZOLIN 2000 MG: 2 INJECTION, POWDER, FOR SOLUTION INTRAMUSCULAR; INTRAVENOUS at 16:28

## 2024-07-03 RX ADMIN — SODIUM CHLORIDE, PRESERVATIVE FREE 10 ML: 5 INJECTION INTRAVENOUS at 21:27

## 2024-07-03 RX ADMIN — FLUOXETINE HYDROCHLORIDE 40 MG: 20 CAPSULE ORAL at 08:16

## 2024-07-03 RX ADMIN — SODIUM CHLORIDE: 9 INJECTION, SOLUTION INTRAVENOUS at 16:13

## 2024-07-03 RX ADMIN — PROPOFOL 150 MG: 10 INJECTION, EMULSION INTRAVENOUS at 16:19

## 2024-07-03 RX ADMIN — IPRATROPIUM BROMIDE AND ALBUTEROL SULFATE 1 DOSE: 2.5; .5 SOLUTION RESPIRATORY (INHALATION) at 19:39

## 2024-07-03 RX ADMIN — ALBUTEROL SULFATE 2 PUFF: 90 AEROSOL, METERED RESPIRATORY (INHALATION) at 18:30

## 2024-07-03 RX ADMIN — ONDANSETRON 4 MG: 2 INJECTION INTRAMUSCULAR; INTRAVENOUS at 17:38

## 2024-07-03 ASSESSMENT — PAIN SCALES - GENERAL
PAINLEVEL_OUTOF10: 4
PAINLEVEL_OUTOF10: 6
PAINLEVEL_OUTOF10: 8
PAINLEVEL_OUTOF10: 4
PAINLEVEL_OUTOF10: 3
PAINLEVEL_OUTOF10: 4
PAINLEVEL_OUTOF10: 7

## 2024-07-03 ASSESSMENT — PAIN DESCRIPTION - DESCRIPTORS
DESCRIPTORS: SHARP;SORE
DESCRIPTORS: DISCOMFORT

## 2024-07-03 ASSESSMENT — LIFESTYLE VARIABLES: SMOKING_STATUS: 0

## 2024-07-03 ASSESSMENT — ENCOUNTER SYMPTOMS: SHORTNESS OF BREATH: 1

## 2024-07-03 ASSESSMENT — PAIN DESCRIPTION - ORIENTATION: ORIENTATION: RIGHT

## 2024-07-03 ASSESSMENT — PAIN DESCRIPTION - LOCATION: LOCATION: HIP

## 2024-07-03 NOTE — ANESTHESIA PRE PROCEDURE
Results   Component Value Date/Time    WBC 10.2 07/03/2024 05:29 AM    RBC 3.45 07/03/2024 05:29 AM    HGB 10.3 07/03/2024 05:29 AM    HCT 30.8 07/03/2024 05:29 AM    MCV 89.3 07/03/2024 05:29 AM    RDW 16.8 07/03/2024 05:29 AM     07/03/2024 05:29 AM       CMP:   Lab Results   Component Value Date/Time     07/03/2024 05:29 AM    K 4.9 07/03/2024 05:29 AM     07/03/2024 05:29 AM    CO2 23 07/03/2024 05:29 AM    BUN 13 07/03/2024 05:29 AM    CREATININE 1.0 07/03/2024 05:29 AM    GFRAA >60 04/07/2022 08:23 AM    LABGLOM 82 07/03/2024 05:29 AM    LABGLOM >60 03/22/2024 09:11 AM    GLUCOSE 174 07/03/2024 05:29 AM    GLUCOSE 87 04/11/2012 07:44 PM    CALCIUM 8.1 07/03/2024 05:29 AM    BILITOT 0.4 07/03/2024 05:29 AM    ALKPHOS 106 07/03/2024 05:29 AM    AST 20 07/03/2024 05:29 AM    ALT 15 07/03/2024 05:29 AM       POC Tests:   Recent Labs     07/03/24  1459   POCGLU 136*       Coags:   Lab Results   Component Value Date/Time    PROTIME 11.7 07/02/2024 06:39 PM    INR 1.1 07/02/2024 06:39 PM       HCG (If Applicable): No results found for: \"PREGTESTUR\", \"PREGSERUM\", \"HCG\", \"HCGQUANT\"     ABGs: No results found for: \"PHART\", \"PO2ART\", \"ALN4EEI\", \"LSJ4XND\", \"BEART\", \"T6KWVYFL\"     Type & Screen (If Applicable):  No results found for: \"LABABO\"    Drug/Infectious Status (If Applicable):  No results found for: \"HIV\", \"HEPCAB\"    COVID-19 Screening (If Applicable):   Lab Results   Component Value Date/Time    COVID19 Not Detected 02/09/2021 10:49 AM           Anesthesia Evaluation  Patient summary reviewed   no history of anesthetic complications:   Airway: Mallampati: II  TM distance: >3 FB   Neck ROM: full  Comment: Fat neck     Dental:    (+) caps      Pulmonary: breath sounds clear to auscultation  (+)   shortness of breath:   sleep apnea: on CPAP and noncompliant,           (-) not a current smoker                           Cardiovascular:  Exercise tolerance: good (>4 METS)  (+) hypertension:, angina:

## 2024-07-03 NOTE — PLAN OF CARE
Problem: Discharge Planning  Goal: Discharge to home or other facility with appropriate resources  7/3/2024 1212 by Yakelin Aggarwal RN  Outcome: Progressing  7/2/2024 2218 by Rosio Douglas RN  Outcome: Progressing     Problem: Pain  Goal: Verbalizes/displays adequate comfort level or baseline comfort level  7/3/2024 1212 by Yakelin Aggarwal RN  Outcome: Progressing  7/2/2024 2218 by Rosio Douglas RN  Outcome: Progressing     Problem: Musculoskeletal - Adult  Goal: Return mobility to safest level of function  7/3/2024 1212 by Yakelin Aggarwal RN  Outcome: Progressing  7/2/2024 2218 by Rosio Douglas RN  Outcome: Progressing     Problem: Safety - Adult  Goal: Free from fall injury  Outcome: Progressing

## 2024-07-03 NOTE — H&P
Department of Internal Medicine  History and Physical    PCP: William Rao APRN - CNP  Admitting Physician: Dr. Vines/Ray  Consultants:   Date of Service: 7/2/2024    CHIEF COMPLAINT:  right knee pain and wound    HISTORY OF PRESENT ILLNESS:    Patient is 73-year-old male who presented to the ED due to fall.  Patient fell earlier today and injured his right knee.  He developed a wound to his right knee pain which affected ambulation.  As such he presented to the ED for further evaluation and management.  States that he does have intermittent episodes of dizziness secondary to blood pressure medications.  States that sometime back he did have his medications adjusted and that is when his dizziness became more frequent.  Otherwise he denies any chest pain or shortness of breath.  Denies any nausea or emesis.  He denies any fever or chills.  Pain is controlled.    PAST MEDICAL Hx:  Past Medical History:   Diagnosis Date    CAD (coronary artery disease)     Dementia ([facility])     early stages  just short term memory loss currently    DM (diabetes mellitus) ([facility])     Hx of cardiovascular stress test 06/16/2020    Hyperlipidemia     Hypertension     Moderate episode of recurrent major depressive disorder ([facility]) 05/17/2018    Myocardial infarction ([facility]) 1995    slight mild    Osteoarthritis     Sleep apnea     does have machine- uses occasionaly       PAST SURGICAL Hx:   Past Surgical History:   Procedure Laterality Date    ANTERIOR CRUCIATE LIGAMENT REPAIR  2002 left knee    COLONOSCOPY      FOOT SURGERY Right 4/21/2022    HIGH ENERGY EXTRACORPEAL SHOCKWAVE THERAPY RIGHT FOOT performed by Jair Cochran Jr., DPM at [facility] OR    HEEL SPUR SURGERY Right 2/18/2021    RESECTION EXOSTOSIS RIGHT FOOT, REPAIR ACHILLES TENDON RIGHT (CPT 82232) ([facility]X) performed by Jair Cochran Jr., DPM at [facility] OR    INGUINAL HERNIA REPAIR  1991 & 1996    JOINT REPLACEMENT  2011 right    right knee    OTHER SURGICAL

## 2024-07-03 NOTE — DISCHARGE INSTRUCTIONS
[facility]  [address] [facility] [address]     Virgil Biggs DO    Orthopaedics Discharge Instructions   {Weight Bearin} on right lower extremity  Pain medication Per Prescriptions  Contact Office for Medication Refill- [phone]  Office can refill pain med every 7 days  If patient discharging to facility then pain control will be continued per facility physician  Ice to operative/injured site for 15-30 minutes of each hour for next 5 days    Recommend that you continue to ice the area 2-3 times per day after this   Elevate operative/injured limb on 2 pillows at home  Goal is to have limb above the heart if able  Wound care -dressings remain clean dry intact.  On postoperative day 7 can be removed and surgical site can be clean with soapy water.  Fracture Care -  If your splint/cast becomes too tight, too loose, wet or damaged please contact our office right away we will need to change out the splint/cast.    DVT prophylaxis,  mg BID.    Follow Up in Office in 2 weeks.     Call the office at [phone]  for directions or with any questions.  Watch for these significant complications.  Call physician if they or any other problems occur:  Fever over 101°, redness, swelling or warmth at the operative site  Unrelieved nausea    Foul smelling or cloudy drainage at the operative site   Unrelieved pain    Blood soaked dressing. (Some oozing may be normal)     Numb, pale, blue, cold or tingling extremity     The following personal items were collected during your admission and were returned to you:    Belongings  Dental Appliances: None  Vision - Corrective Lenses: Eyeglasses  Hearing Aid: At home  Clothing: Sent home  Jewelry: None  Electronic Devices: Cell Phone  Weapons (Notify Protective Services/Security): None  Home Medications: None  Valuables Given To: Family (Comment)  Provide Name(s) of Who Valuable(s) Were Given To: wife    Information obtained

## 2024-07-03 NOTE — PLAN OF CARE
Problem: Discharge Planning  Goal: Discharge to home or other facility with appropriate resources  Outcome: Progressing     Problem: Pain  Goal: Verbalizes/displays adequate comfort level or baseline comfort level  Outcome: Progressing     Problem: Musculoskeletal - Adult  Goal: Maintain proper alignment of affected body part  Outcome: Progressing

## 2024-07-03 NOTE — ANESTHESIA POSTPROCEDURE EVALUATION
Department of Anesthesiology  Postprocedure Note    Patient: Robby Phan  MRN: [id number]  YOB: 1950  Date of evaluation: 7/3/2024    Procedure Summary       Date: 07/03/24 Room / Location: [address]    Anesthesia Start: 1613 Anesthesia Stop: 1901    Procedure: RIGHT FEMUR IRRIGATION AND DEBRIDEMENT WITH OPEN REDUCTION INTERNAL FIXATION (Right: Leg Upper) Diagnosis:       Age-related osteoporosis with current pathological fracture of right femur, initial encounter ([facility])      (Age-related osteoporosis with current pathological fracture of right femur, initial encounter ([facility]) [M80.051A])    Surgeons: Virgil Biggs DO Responsible Provider: Fran Fernandez MD    Anesthesia Type: general ASA Status: 3            Anesthesia Type: No value filed.    Shabbir Phase I: Shabbir Score: 8    Shabbir Phase II:      Anesthesia Post Evaluation    Patient location during evaluation: PACU  Patient participation: complete - patient participated  Level of consciousness: awake  Airway patency: patent  Nausea & Vomiting: no nausea and no vomiting  Cardiovascular status: hemodynamically stable  Respiratory status: acceptable  Hydration status: euvolemic  Pain management: adequate    No notable events documented.

## 2024-07-04 LAB
ALBUMIN SERPL-MCNC: 3.3 G/DL (ref 3.5–5.2)
ALP SERPL-CCNC: 94 U/L (ref 40–129)
ALT SERPL-CCNC: 10 U/L (ref 0–40)
ANION GAP SERPL CALCULATED.3IONS-SCNC: 10 MMOL/L (ref 7–16)
AST SERPL-CCNC: 23 U/L (ref 0–39)
BASOPHILS # BLD: 0 K/UL (ref 0–0.2)
BASOPHILS NFR BLD: 0 % (ref 0–2)
BILIRUB SERPL-MCNC: 0.3 MG/DL (ref 0–1.2)
BUN SERPL-MCNC: 17 MG/DL (ref 6–23)
CALCIUM SERPL-MCNC: 8 MG/DL (ref 8.6–10.2)
CHLORIDE SERPL-SCNC: 99 MMOL/L (ref 98–107)
CO2 SERPL-SCNC: 22 MMOL/L (ref 22–29)
CREAT SERPL-MCNC: 1.3 MG/DL (ref 0.7–1.2)
EOSINOPHIL # BLD: 0 K/UL (ref 0.05–0.5)
EOSINOPHILS RELATIVE PERCENT: 0 % (ref 0–6)
ERYTHROCYTE [DISTWIDTH] IN BLOOD BY AUTOMATED COUNT: 16.7 % (ref 11.5–15)
GFR, ESTIMATED: 56 ML/MIN/1.73M2
GLUCOSE BLD-MCNC: 199 MG/DL (ref 74–99)
GLUCOSE BLD-MCNC: 252 MG/DL (ref 74–99)
GLUCOSE BLD-MCNC: 287 MG/DL (ref 74–99)
GLUCOSE BLD-MCNC: 334 MG/DL (ref 74–99)
GLUCOSE SERPL-MCNC: 282 MG/DL (ref 74–99)
HCT VFR BLD AUTO: 26.9 % (ref 37–54)
HGB BLD-MCNC: 8.6 G/DL (ref 12.5–16.5)
LYMPHOCYTES NFR BLD: 0.55 K/UL (ref 1.5–4)
LYMPHOCYTES RELATIVE PERCENT: 4 % (ref 20–42)
MCH RBC QN AUTO: 28.5 PG (ref 26–35)
MCHC RBC AUTO-ENTMCNC: 32 G/DL (ref 32–34.5)
MCV RBC AUTO: 89.1 FL (ref 80–99.9)
MONOCYTES NFR BLD: 1.24 K/UL (ref 0.1–0.95)
MONOCYTES NFR BLD: 8 % (ref 2–12)
NEUTROPHILS NFR BLD: 89 % (ref 43–80)
NEUTS SEG NFR BLD: 14.1 K/UL (ref 1.8–7.3)
PLATELET # BLD AUTO: 187 K/UL (ref 130–450)
PMV BLD AUTO: 12.6 FL (ref 7–12)
POTASSIUM SERPL-SCNC: 5 MMOL/L (ref 3.5–5)
PROT SERPL-MCNC: 6.2 G/DL (ref 6.4–8.3)
RBC # BLD AUTO: 3.02 M/UL (ref 3.8–5.8)
RBC # BLD: ABNORMAL 10*6/UL
SODIUM SERPL-SCNC: 131 MMOL/L (ref 132–146)
WBC OTHER # BLD: 15.9 K/UL (ref 4.5–11.5)

## 2024-07-04 PROCEDURE — 6370000000 HC RX 637 (ALT 250 FOR IP): Performed by: ORTHOPAEDIC SURGERY

## 2024-07-04 PROCEDURE — 1200000000 HC SEMI PRIVATE

## 2024-07-04 PROCEDURE — 97530 THERAPEUTIC ACTIVITIES: CPT | Performed by: PHYSICAL THERAPIST

## 2024-07-04 PROCEDURE — 85025 COMPLETE CBC W/AUTO DIFF WBC: CPT

## 2024-07-04 PROCEDURE — 36415 COLL VENOUS BLD VENIPUNCTURE: CPT

## 2024-07-04 PROCEDURE — 6360000002 HC RX W HCPCS: Performed by: SPECIALIST/TECHNOLOGIST

## 2024-07-04 PROCEDURE — 80053 COMPREHEN METABOLIC PANEL: CPT

## 2024-07-04 PROCEDURE — 2580000003 HC RX 258: Performed by: SPECIALIST/TECHNOLOGIST

## 2024-07-04 PROCEDURE — 97161 PT EVAL LOW COMPLEX 20 MIN: CPT | Performed by: PHYSICAL THERAPIST

## 2024-07-04 PROCEDURE — 2580000003 HC RX 258: Performed by: ORTHOPAEDIC SURGERY

## 2024-07-04 PROCEDURE — 82962 GLUCOSE BLOOD TEST: CPT

## 2024-07-04 PROCEDURE — 6370000000 HC RX 637 (ALT 250 FOR IP): Performed by: NURSE PRACTITIONER

## 2024-07-04 PROCEDURE — 6360000002 HC RX W HCPCS: Performed by: ORTHOPAEDIC SURGERY

## 2024-07-04 RX ORDER — INSULIN LISPRO 100 [IU]/ML
0-4 INJECTION, SOLUTION INTRAVENOUS; SUBCUTANEOUS NIGHTLY
Status: DISCONTINUED | OUTPATIENT
Start: 2024-07-04 | End: 2024-07-08 | Stop reason: HOSPADM

## 2024-07-04 RX ORDER — INSULIN LISPRO 100 [IU]/ML
0-8 INJECTION, SOLUTION INTRAVENOUS; SUBCUTANEOUS
Status: DISCONTINUED | OUTPATIENT
Start: 2024-07-04 | End: 2024-07-08 | Stop reason: HOSPADM

## 2024-07-04 RX ADMIN — LEVOTHYROXINE SODIUM 100 MCG: 0.1 TABLET ORAL at 05:51

## 2024-07-04 RX ADMIN — ATORVASTATIN CALCIUM 20 MG: 20 TABLET, FILM COATED ORAL at 20:06

## 2024-07-04 RX ADMIN — CEFAZOLIN 2000 MG: 2 INJECTION, POWDER, FOR SOLUTION INTRAMUSCULAR; INTRAVENOUS at 08:31

## 2024-07-04 RX ADMIN — Medication 1000 UNITS: at 08:26

## 2024-07-04 RX ADMIN — CYANOCOBALAMIN TAB 1000 MCG 500 MCG: 1000 TAB at 08:28

## 2024-07-04 RX ADMIN — FLUOXETINE HYDROCHLORIDE 40 MG: 20 CAPSULE ORAL at 08:26

## 2024-07-04 RX ADMIN — OXYCODONE 10 MG: 5 TABLET ORAL at 20:06

## 2024-07-04 RX ADMIN — OXYCODONE 10 MG: 5 TABLET ORAL at 05:59

## 2024-07-04 RX ADMIN — SODIUM CHLORIDE, PRESERVATIVE FREE 10 ML: 5 INJECTION INTRAVENOUS at 20:12

## 2024-07-04 RX ADMIN — TAMSULOSIN HYDROCHLORIDE 0.4 MG: 0.4 CAPSULE ORAL at 20:07

## 2024-07-04 RX ADMIN — PROCHLORPERAZINE EDISYLATE 5 MG: 5 INJECTION INTRAMUSCULAR; INTRAVENOUS at 17:36

## 2024-07-04 RX ADMIN — GLIPIZIDE 5 MG: 5 TABLET ORAL at 15:55

## 2024-07-04 RX ADMIN — INSULIN LISPRO 6 UNITS: 100 INJECTION, SOLUTION INTRAVENOUS; SUBCUTANEOUS at 12:01

## 2024-07-04 RX ADMIN — GLIPIZIDE 5 MG: 5 TABLET ORAL at 05:51

## 2024-07-04 RX ADMIN — DONEPEZIL HYDROCHLORIDE 10 MG: 5 TABLET, FILM COATED ORAL at 20:07

## 2024-07-04 RX ADMIN — AMLODIPINE BESYLATE 10 MG: 10 TABLET ORAL at 08:29

## 2024-07-04 RX ADMIN — ASPIRIN 325 MG: 325 TABLET, COATED ORAL at 08:27

## 2024-07-04 RX ADMIN — LISINOPRIL 2.5 MG: 5 TABLET ORAL at 08:26

## 2024-07-04 RX ADMIN — CEFAZOLIN 2000 MG: 2 INJECTION, POWDER, FOR SOLUTION INTRAMUSCULAR; INTRAVENOUS at 15:56

## 2024-07-04 RX ADMIN — FERROUS SULFATE TAB 325 MG (65 MG ELEMENTAL FE) 325 MG: 325 (65 FE) TAB at 08:27

## 2024-07-04 RX ADMIN — SODIUM CHLORIDE, PRESERVATIVE FREE 10 ML: 5 INJECTION INTRAVENOUS at 08:35

## 2024-07-04 ASSESSMENT — PAIN SCALES - WONG BAKER: WONGBAKER_NUMERICALRESPONSE: NO HURT

## 2024-07-04 ASSESSMENT — PAIN SCALES - GENERAL
PAINLEVEL_OUTOF10: 6
PAINLEVEL_OUTOF10: 7
PAINLEVEL_OUTOF10: 3
PAINLEVEL_OUTOF10: 4

## 2024-07-04 ASSESSMENT — PAIN DESCRIPTION - DESCRIPTORS: DESCRIPTORS: PRESSURE;POUNDING

## 2024-07-04 ASSESSMENT — PAIN DESCRIPTION - ORIENTATION
ORIENTATION: RIGHT
ORIENTATION: RIGHT

## 2024-07-04 ASSESSMENT — PAIN DESCRIPTION - LOCATION
LOCATION: LEG
LOCATION: LEG

## 2024-07-04 NOTE — OP NOTE
[address] [address]                            OPERATIVE REPORT      PATIENT NAME: PATRICIA PONCE                  : 1950  MED REC NO: [id number]                        ROOM: [place]  ACCOUNT NO: [id number]                       ADMIT DATE: 2024  PROVIDER: Virgil Biggs DO      DATE OF PROCEDURE:  2024    SURGEON:  Virgil Biggs DO    PREOPERATIVE DIAGNOSIS:  Displaced comminuted fracture, right distal femur.    POSTOPERATIVE DIAGNOSIS:  Displaced comminuted fracture, right distal femur.    OPERATIONS:  Open reduction and internal fixation of distal femur fracture with right knee arthrotomy and interlocking retrograde intramedullary nail.    DESCRIPTION OF PROCEDURE:  The patient was brought to the operative theatre where a general anesthetic was induced by Department of Anesthesiology.  Right knee, leg, and foot were prepped and draped in usual sterile fashion.  No tourniquet was used for today's procedure.  Anterior midline incision was placed over front of the right knee.  The medial pericapsular incision was performed.  The right knee prosthesis was placed by another surgeon 13 years ago.  He is stable and well fixed.  No signs of loosening.  This patient did have a small SMA area of opening on the lateral thigh, which was felt to be open grade 1 fracture last night in emergency room.  I felt there was no collection in the open area and the fracture under my inspection today.  Therefore, I did not label this note for the fracture.  Nonetheless, I irrigated the wound, irrigated with antibiotic solution today as we did last night in emergency room.  He had no pus throughout today's procedure.  I then reduced the fracture and placed a guidewire through the femur prosthesis of the total knee component and drilled it up into the proximal femur.  Good placement of the guidewires appreciable with AP and

## 2024-07-04 NOTE — PLAN OF CARE
Problem: Discharge Planning  Goal: Discharge to home or other facility with appropriate resources  7/3/2024 2252 by Anais Meneses RN  Outcome: Progressing     Problem: Pain  Goal: Verbalizes/displays adequate comfort level or baseline comfort level  7/3/2024 2252 by Anais Meneses RN  Outcome: Progressing     Problem: Musculoskeletal - Adult  Goal: Return mobility to safest level of function  7/3/2024 2252 by Anais Meneses RN  Outcome: Progressing     Problem: Safety - Adult  Goal: Free from fall injury  7/3/2024 2252 by Anais Meneses RN  Outcome: Progressing     Problem: ABCDS Injury Assessment  Goal: Absence of physical injury  7/3/2024 2252 by Anais Meneses RN  Outcome: Progressing     Problem: Chronic Conditions and Co-morbidities  Goal: Patient's chronic conditions and co-morbidity symptoms are monitored and maintained or improved  Outcome: Progressing

## 2024-07-05 LAB
ALBUMIN SERPL-MCNC: 3.2 G/DL (ref 3.5–5.2)
ALP SERPL-CCNC: 83 U/L (ref 40–129)
ALT SERPL-CCNC: 5 U/L (ref 0–40)
ANION GAP SERPL CALCULATED.3IONS-SCNC: 12 MMOL/L (ref 7–16)
AST SERPL-CCNC: 18 U/L (ref 0–39)
BASOPHILS # BLD: 0.12 K/UL (ref 0–0.2)
BASOPHILS NFR BLD: 1 % (ref 0–2)
BILIRUB SERPL-MCNC: 0.3 MG/DL (ref 0–1.2)
BUN SERPL-MCNC: 22 MG/DL (ref 6–23)
CALCIUM SERPL-MCNC: 7.9 MG/DL (ref 8.6–10.2)
CHLORIDE SERPL-SCNC: 102 MMOL/L (ref 98–107)
CO2 SERPL-SCNC: 22 MMOL/L (ref 22–29)
CREAT SERPL-MCNC: 1.5 MG/DL (ref 0.7–1.2)
EKG ATRIAL RATE: 122 BPM
EKG P AXIS: 45 DEGREES
EKG P-R INTERVAL: 156 MS
EKG Q-T INTERVAL: 322 MS
EKG QRS DURATION: 94 MS
EKG QTC CALCULATION (BAZETT): 458 MS
EKG R AXIS: 21 DEGREES
EKG T AXIS: 22 DEGREES
EKG VENTRICULAR RATE: 122 BPM
EOSINOPHIL # BLD: 0 K/UL (ref 0.05–0.5)
EOSINOPHILS RELATIVE PERCENT: 0 % (ref 0–6)
ERYTHROCYTE [DISTWIDTH] IN BLOOD BY AUTOMATED COUNT: 16.7 % (ref 11.5–15)
GFR, ESTIMATED: 49 ML/MIN/1.73M2
GLUCOSE BLD-MCNC: 186 MG/DL (ref 74–99)
GLUCOSE BLD-MCNC: 214 MG/DL (ref 74–99)
GLUCOSE BLD-MCNC: 221 MG/DL (ref 74–99)
GLUCOSE BLD-MCNC: 264 MG/DL (ref 74–99)
GLUCOSE SERPL-MCNC: 179 MG/DL (ref 74–99)
HCT VFR BLD AUTO: 22.9 % (ref 37–54)
HCT VFR BLD AUTO: 23 % (ref 37–54)
HGB BLD-MCNC: 7.3 G/DL (ref 12.5–16.5)
HGB BLD-MCNC: 7.5 G/DL (ref 12.5–16.5)
LYMPHOCYTES NFR BLD: 1.46 K/UL (ref 1.5–4)
LYMPHOCYTES RELATIVE PERCENT: 11 % (ref 20–42)
MCH RBC QN AUTO: 29.1 PG (ref 26–35)
MCHC RBC AUTO-ENTMCNC: 32.8 G/DL (ref 32–34.5)
MCV RBC AUTO: 88.8 FL (ref 80–99.9)
MONOCYTES NFR BLD: 0.85 K/UL (ref 0.1–0.95)
MONOCYTES NFR BLD: 6 % (ref 2–12)
NEUTROPHILS NFR BLD: 83 % (ref 43–80)
NEUTS SEG NFR BLD: 11.46 K/UL (ref 1.8–7.3)
NUCLEATED RED BLOOD CELLS: 1 PER 100 WBC
PLATELET # BLD AUTO: 171 K/UL (ref 130–450)
PMV BLD AUTO: 13 FL (ref 7–12)
POTASSIUM SERPL-SCNC: 4.2 MMOL/L (ref 3.5–5)
PROT SERPL-MCNC: 5.8 G/DL (ref 6.4–8.3)
RBC # BLD AUTO: 2.58 M/UL (ref 3.8–5.8)
RBC # BLD: ABNORMAL 10*6/UL
SODIUM SERPL-SCNC: 136 MMOL/L (ref 132–146)
WBC OTHER # BLD: 13.9 K/UL (ref 4.5–11.5)

## 2024-07-05 PROCEDURE — 85014 HEMATOCRIT: CPT

## 2024-07-05 PROCEDURE — 2580000003 HC RX 258: Performed by: ORTHOPAEDIC SURGERY

## 2024-07-05 PROCEDURE — 97165 OT EVAL LOW COMPLEX 30 MIN: CPT

## 2024-07-05 PROCEDURE — 6370000000 HC RX 637 (ALT 250 FOR IP): Performed by: NURSE PRACTITIONER

## 2024-07-05 PROCEDURE — 80053 COMPREHEN METABOLIC PANEL: CPT

## 2024-07-05 PROCEDURE — 85025 COMPLETE CBC W/AUTO DIFF WBC: CPT

## 2024-07-05 PROCEDURE — 97530 THERAPEUTIC ACTIVITIES: CPT

## 2024-07-05 PROCEDURE — 85018 HEMOGLOBIN: CPT

## 2024-07-05 PROCEDURE — 1200000000 HC SEMI PRIVATE

## 2024-07-05 PROCEDURE — 82962 GLUCOSE BLOOD TEST: CPT

## 2024-07-05 PROCEDURE — 2580000003 HC RX 258: Performed by: INTERNAL MEDICINE

## 2024-07-05 PROCEDURE — 6370000000 HC RX 637 (ALT 250 FOR IP): Performed by: ORTHOPAEDIC SURGERY

## 2024-07-05 PROCEDURE — 97535 SELF CARE MNGMENT TRAINING: CPT

## 2024-07-05 PROCEDURE — 93010 ELECTROCARDIOGRAM REPORT: CPT | Performed by: INTERNAL MEDICINE

## 2024-07-05 PROCEDURE — 36415 COLL VENOUS BLD VENIPUNCTURE: CPT

## 2024-07-05 RX ORDER — SODIUM CHLORIDE 9 MG/ML
INJECTION, SOLUTION INTRAVENOUS CONTINUOUS
Status: ACTIVE | OUTPATIENT
Start: 2024-07-05 | End: 2024-07-05

## 2024-07-05 RX ADMIN — CYANOCOBALAMIN TAB 1000 MCG 500 MCG: 1000 TAB at 08:39

## 2024-07-05 RX ADMIN — AMLODIPINE BESYLATE 10 MG: 10 TABLET ORAL at 08:40

## 2024-07-05 RX ADMIN — GLIPIZIDE 5 MG: 5 TABLET ORAL at 06:44

## 2024-07-05 RX ADMIN — FLUOXETINE HYDROCHLORIDE 40 MG: 20 CAPSULE ORAL at 08:39

## 2024-07-05 RX ADMIN — OXYCODONE 10 MG: 5 TABLET ORAL at 08:39

## 2024-07-05 RX ADMIN — Medication 1000 UNITS: at 08:38

## 2024-07-05 RX ADMIN — INSULIN LISPRO 2 UNITS: 100 INJECTION, SOLUTION INTRAVENOUS; SUBCUTANEOUS at 08:36

## 2024-07-05 RX ADMIN — DONEPEZIL HYDROCHLORIDE 10 MG: 5 TABLET, FILM COATED ORAL at 20:32

## 2024-07-05 RX ADMIN — FERROUS SULFATE TAB 325 MG (65 MG ELEMENTAL FE) 325 MG: 325 (65 FE) TAB at 08:39

## 2024-07-05 RX ADMIN — SODIUM CHLORIDE: 9 INJECTION, SOLUTION INTRAVENOUS at 08:49

## 2024-07-05 RX ADMIN — SODIUM CHLORIDE, PRESERVATIVE FREE 10 ML: 5 INJECTION INTRAVENOUS at 20:34

## 2024-07-05 RX ADMIN — ASPIRIN 325 MG: 325 TABLET, COATED ORAL at 08:38

## 2024-07-05 RX ADMIN — LEVOTHYROXINE SODIUM 100 MCG: 0.1 TABLET ORAL at 06:44

## 2024-07-05 RX ADMIN — ATORVASTATIN CALCIUM 20 MG: 20 TABLET, FILM COATED ORAL at 20:33

## 2024-07-05 RX ADMIN — TAMSULOSIN HYDROCHLORIDE 0.4 MG: 0.4 CAPSULE ORAL at 20:32

## 2024-07-05 RX ADMIN — INSULIN LISPRO 2 UNITS: 100 INJECTION, SOLUTION INTRAVENOUS; SUBCUTANEOUS at 11:25

## 2024-07-05 ASSESSMENT — PAIN DESCRIPTION - DESCRIPTORS: DESCRIPTORS: ACHING;SHARP

## 2024-07-05 ASSESSMENT — PAIN DESCRIPTION - ORIENTATION: ORIENTATION: RIGHT

## 2024-07-05 ASSESSMENT — PAIN SCALES - GENERAL: PAINLEVEL_OUTOF10: 9

## 2024-07-05 ASSESSMENT — PAIN DESCRIPTION - LOCATION: LOCATION: LEG

## 2024-07-05 NOTE — DISCHARGE INSTR - COC
Sanguinous 07/03/24 0936   Odor None 07/03/24 0936   Number of days: 2       Incision 07/03/24 Thigh Right (Active)   Dressing Status Clean;Dry;Intact 07/05/24 0940   Dressing/Treatment Other (comment) 07/05/24 0940   Drainage Amount None (dry) 07/05/24 0940   Odor None 07/05/24 0940   Chica-incision Assessment Intact 07/03/24 2013   Number of days: 1        Elimination:  Continence:   Bowel: Yes  Bladder: Yes  Urinary Catheter: None   Colostomy/Ileostomy/Ileal Conduit: No       Date of Last BM: ***    Intake/Output Summary (Last 24 hours) at 7/5/2024 1331  Last data filed at 7/5/2024 1328  Gross per 24 hour   Intake 1100 ml   Output 1200 ml   Net -100 ml     I/O last 3 completed shifts:  In: 250 [P.O.:220; I.V.:30]  Out: 1350 [Urine:1350]    Safety Concerns:     At Risk for Falls    Impairments/Disabilities:      Vision    Nutrition Therapy:  Current Nutrition Therapy:   - Oral Diet:  Carb Control 4 carbs/meal (1800kcals/day)    Routes of Feeding: Oral  Liquids: Thin Liquids  Daily Fluid Restriction: no  Last Modified Barium Swallow with Video (Video Swallowing Test): not done    Treatments at the Time of Hospital Discharge:   Respiratory Treatments: ***  Oxygen Therapy:  is not on home oxygen therapy.  Ventilator:    - No ventilator support    Rehab Therapies: Physical Therapy and Occupational Therapy  Weight Bearing Status/Restrictions: No weight bearing restrictions  Other Medical Equipment (for information only, NOT a DME order):  walker  Other Treatments: ***    Patient's personal belongings (please select all that are sent with patient):  Zeb    RN SIGNATURE:  Electronically signed by Bridget Carreon RN on 7/8/24 at 10:15 AM EDT    CASE MANAGEMENT/SOCIAL WORK SECTION    Inpatient Status Date: 7/2/2024    Readmission Risk Assessment Score:  Readmission Risk              Risk of Unplanned Readmission:  25           Discharging to Facility/ Agency   Name: [facility]  Address:  Phone: [phone]  Fax:

## 2024-07-05 NOTE — PLAN OF CARE
Problem: Discharge Planning  Goal: Discharge to home or other facility with appropriate resources  Outcome: Progressing     Problem: Pain  Goal: Verbalizes/displays adequate comfort level or baseline comfort level  Outcome: Progressing     Problem: Musculoskeletal - Adult  Goal: Return mobility to safest level of function  Outcome: Progressing  Goal: Maintain proper alignment of affected body part  Outcome: Progressing     Problem: Safety - Adult  Goal: Free from fall injury  Outcome: Progressing     Problem: ABCDS Injury Assessment  Goal: Absence of physical injury  Outcome: Progressing     Problem: Chronic Conditions and Co-morbidities  Goal: Patient's chronic conditions and co-morbidity symptoms are monitored and maintained or improved  Outcome: Progressing

## 2024-07-05 NOTE — ACP (ADVANCE CARE PLANNING)
Advance Care Planning   Healthcare Decision Maker:    Primary Decision Maker: Katharine Phan - Spouse - [phone]    Secondary Decision Maker: EmilieKonrad - Child - [phone]    Click here to complete Healthcare Decision Makers including selection of the Healthcare Decision Maker Relationship (ie \"Primary\").  Today we documented Decision Maker(s) consistent with Legal Next of Kin hierarchy.

## 2024-07-05 NOTE — CARE COORDINATION
7/5/2024: SS NOTE:  Notified by Iveth, admissions for [facility] that she does not feel pt would be appropriate for acute rehab under his [employer] insurance, suggest [facility], met with pt, currently a&o, pleasant, reviewed SNF provider list, pt prefers [facility], referral made to [facility], admissions liaison, rehab bed is available, awaiting chart review and acceptance, PRE CERT NEEDED prior to transfer, sw/cm to follow, Nursing informed.Electronically signed by RUDDY Escoto on 7/5/2024 at 11:51 AM

## 2024-07-05 NOTE — PLAN OF CARE
Problem: Discharge Planning  Goal: Discharge to home or other facility with appropriate resources  7/5/2024 0741 by Shasha Patel, RN  Outcome: Progressing  7/4/2024 2016 by Fabiano Segura, RN  Outcome: Progressing

## 2024-07-05 NOTE — CARE COORDINATION
Case Management Assessment  Initial Evaluation    Date/Time of Evaluation: 7/5/2024 8:58 AM  Assessment Completed by: RUDDY Escoto    If patient is discharged prior to next notation, then this note serves as note for discharge by case management.    Patient Name: Robby Phan                   YOB: 1950  Diagnosis: Pre-op chest exam [Z01.811]  Open comminuted intra-articular fracture of distal femur, left, type I or II, initial encounter ([facility]) [S72.492B]                   Date / Time: 7/2/2024  2:56 PM    Patient Admission Status: Inpatient   Readmission Risk (Low < 19, Mod (19-27), High > 27): Readmission Risk Score: 19.3    Current PCP: William Rao APRN - CNP  PCP verified by CM? Yes    Chart Reviewed: Yes      History Provided by: Medical Record, Spouse, Patient  Patient Orientation: Alert and Oriented    Patient Cognition: Dementia / Early Alzheimer's    Hospitalization in the last 30 days (Readmission):  No    If yes, Readmission Assessment in  Navigator will be completed.    Advance Directives:      Code Status: Full Code   Patient's Primary Decision Maker is:      Primary Decision Maker: Katharine Phan - Spouse - [phone]    Secondary Decision Maker: EmilieKonrad - Child - [phone]    Discharge Planning:    Patient lives with: Spouse/Significant Other Type of Home: House  Primary Care Giver: Self  Patient Support Systems include: Spouse/Significant Other   Current Financial resources:    Current community resources:    Current services prior to admission: None            Current DME:              Type of Home Care services:  PT, OT    ADLS  Prior functional level: Independent in ADLs/IADLs  Current functional level: Assistance with the following:, Bathing, Dressing, Toileting, Mobility    PT AM-PAC: 11 /24  OT AM-PAC:   /24    Family can provide assistance at DC: Yes  Would you like Case Management to discuss the discharge plan with any other family members/significant others,

## 2024-07-06 LAB
ALBUMIN SERPL-MCNC: 2.8 G/DL (ref 3.5–5.2)
ALP SERPL-CCNC: 80 U/L (ref 40–129)
ALT SERPL-CCNC: 6 U/L (ref 0–40)
ANION GAP SERPL CALCULATED.3IONS-SCNC: 10 MMOL/L (ref 7–16)
ANION GAP SERPL CALCULATED.3IONS-SCNC: 15 MMOL/L (ref 7–16)
AST SERPL-CCNC: 20 U/L (ref 0–39)
BASOPHILS # BLD: 0.06 K/UL (ref 0–0.2)
BASOPHILS NFR BLD: 1 % (ref 0–2)
BILIRUB SERPL-MCNC: 0.5 MG/DL (ref 0–1.2)
BUN SERPL-MCNC: 20 MG/DL (ref 6–23)
BUN SERPL-MCNC: 21 MG/DL (ref 6–23)
CALCIUM SERPL-MCNC: 8.1 MG/DL (ref 8.6–10.2)
CALCIUM SERPL-MCNC: 8.5 MG/DL (ref 8.6–10.2)
CHLORIDE SERPL-SCNC: 101 MMOL/L (ref 98–107)
CHLORIDE SERPL-SCNC: 102 MMOL/L (ref 98–107)
CO2 SERPL-SCNC: 20 MMOL/L (ref 22–29)
CO2 SERPL-SCNC: 23 MMOL/L (ref 22–29)
CREAT SERPL-MCNC: 1.3 MG/DL (ref 0.7–1.2)
CREAT SERPL-MCNC: 1.3 MG/DL (ref 0.7–1.2)
EOSINOPHIL # BLD: 0.37 K/UL (ref 0.05–0.5)
EOSINOPHILS RELATIVE PERCENT: 3 % (ref 0–6)
ERYTHROCYTE [DISTWIDTH] IN BLOOD BY AUTOMATED COUNT: 16.7 % (ref 11.5–15)
FERRITIN SERPL-MCNC: 58 NG/ML
FOLATE SERPL-MCNC: 19.9 NG/ML (ref 4.8–24.2)
GFR, ESTIMATED: 59 ML/MIN/1.73M2
GFR, ESTIMATED: 60 ML/MIN/1.73M2
GLUCOSE BLD-MCNC: 184 MG/DL (ref 74–99)
GLUCOSE BLD-MCNC: 202 MG/DL (ref 74–99)
GLUCOSE BLD-MCNC: 205 MG/DL (ref 74–99)
GLUCOSE BLD-MCNC: 212 MG/DL (ref 74–99)
GLUCOSE SERPL-MCNC: 179 MG/DL (ref 74–99)
GLUCOSE SERPL-MCNC: 211 MG/DL (ref 74–99)
HCT VFR BLD AUTO: 22.3 % (ref 37–54)
HCT VFR BLD AUTO: 25.8 % (ref 37–54)
HGB BLD-MCNC: 7.2 G/DL (ref 12.5–16.5)
HGB BLD-MCNC: 8.3 G/DL (ref 12.5–16.5)
IMM GRANULOCYTES # BLD AUTO: 0.07 K/UL (ref 0–0.58)
IMM GRANULOCYTES NFR BLD: 1 % (ref 0–5)
IMM RETICS NFR: 29.5 % (ref 2.3–13.4)
IRON SATN MFR SERPL: 8 % (ref 20–55)
IRON SERPL-MCNC: 18 UG/DL (ref 59–158)
LYMPHOCYTES NFR BLD: 1.75 K/UL (ref 1.5–4)
LYMPHOCYTES RELATIVE PERCENT: 15 % (ref 20–42)
MCH RBC QN AUTO: 28.8 PG (ref 26–35)
MCHC RBC AUTO-ENTMCNC: 32.3 G/DL (ref 32–34.5)
MCV RBC AUTO: 89.2 FL (ref 80–99.9)
MONOCYTES NFR BLD: 1.43 K/UL (ref 0.1–0.95)
MONOCYTES NFR BLD: 12 % (ref 2–12)
NEUTROPHILS NFR BLD: 69 % (ref 43–80)
NEUTS SEG NFR BLD: 8.05 K/UL (ref 1.8–7.3)
PLATELET # BLD AUTO: 189 K/UL (ref 130–450)
PMV BLD AUTO: 12.3 FL (ref 7–12)
POTASSIUM SERPL-SCNC: 4.2 MMOL/L (ref 3.5–5)
POTASSIUM SERPL-SCNC: 4.2 MMOL/L (ref 3.5–5)
PROT SERPL-MCNC: 5.8 G/DL (ref 6.4–8.3)
RBC # BLD AUTO: 2.5 M/UL (ref 3.8–5.8)
RETIC HEMOGLOBIN: 28.2 PG (ref 28.2–36.6)
RETICS # AUTO: 0.07 M/UL
RETICS/RBC NFR AUTO: 2.6 % (ref 0.4–1.9)
SODIUM SERPL-SCNC: 134 MMOL/L (ref 132–146)
SODIUM SERPL-SCNC: 137 MMOL/L (ref 132–146)
TIBC SERPL-MCNC: 216 UG/DL (ref 250–450)
VIT B12 SERPL-MCNC: 1724 PG/ML (ref 211–946)
WBC OTHER # BLD: 11.7 K/UL (ref 4.5–11.5)

## 2024-07-06 PROCEDURE — 82607 VITAMIN B-12: CPT

## 2024-07-06 PROCEDURE — 85025 COMPLETE CBC W/AUTO DIFF WBC: CPT

## 2024-07-06 PROCEDURE — 97530 THERAPEUTIC ACTIVITIES: CPT

## 2024-07-06 PROCEDURE — 82728 ASSAY OF FERRITIN: CPT

## 2024-07-06 PROCEDURE — 86923 COMPATIBILITY TEST ELECTRIC: CPT

## 2024-07-06 PROCEDURE — 86900 BLOOD TYPING SEROLOGIC ABO: CPT

## 2024-07-06 PROCEDURE — P9016 RBC LEUKOCYTES REDUCED: HCPCS

## 2024-07-06 PROCEDURE — 83550 IRON BINDING TEST: CPT

## 2024-07-06 PROCEDURE — 30233N1 TRANSFUSION OF NONAUTOLOGOUS RED BLOOD CELLS INTO PERIPHERAL VEIN, PERCUTANEOUS APPROACH: ICD-10-PCS | Performed by: INTERNAL MEDICINE

## 2024-07-06 PROCEDURE — 80048 BASIC METABOLIC PNL TOTAL CA: CPT

## 2024-07-06 PROCEDURE — 83540 ASSAY OF IRON: CPT

## 2024-07-06 PROCEDURE — 85018 HEMOGLOBIN: CPT

## 2024-07-06 PROCEDURE — 1200000000 HC SEMI PRIVATE

## 2024-07-06 PROCEDURE — 36415 COLL VENOUS BLD VENIPUNCTURE: CPT

## 2024-07-06 PROCEDURE — 97110 THERAPEUTIC EXERCISES: CPT

## 2024-07-06 PROCEDURE — 82746 ASSAY OF FOLIC ACID SERUM: CPT

## 2024-07-06 PROCEDURE — 6370000000 HC RX 637 (ALT 250 FOR IP): Performed by: ORTHOPAEDIC SURGERY

## 2024-07-06 PROCEDURE — 80053 COMPREHEN METABOLIC PANEL: CPT

## 2024-07-06 PROCEDURE — 2580000003 HC RX 258: Performed by: INTERNAL MEDICINE

## 2024-07-06 PROCEDURE — 85014 HEMATOCRIT: CPT

## 2024-07-06 PROCEDURE — 6370000000 HC RX 637 (ALT 250 FOR IP): Performed by: NURSE PRACTITIONER

## 2024-07-06 PROCEDURE — 85045 AUTOMATED RETICULOCYTE COUNT: CPT

## 2024-07-06 PROCEDURE — 36430 TRANSFUSION BLD/BLD COMPNT: CPT

## 2024-07-06 PROCEDURE — 86850 RBC ANTIBODY SCREEN: CPT

## 2024-07-06 PROCEDURE — 86901 BLOOD TYPING SEROLOGIC RH(D): CPT

## 2024-07-06 PROCEDURE — 82962 GLUCOSE BLOOD TEST: CPT

## 2024-07-06 RX ORDER — SODIUM CHLORIDE 9 MG/ML
INJECTION, SOLUTION INTRAVENOUS PRN
Status: DISCONTINUED | OUTPATIENT
Start: 2024-07-06 | End: 2024-07-08 | Stop reason: HOSPADM

## 2024-07-06 RX ORDER — SODIUM CHLORIDE 9 MG/ML
INJECTION, SOLUTION INTRAVENOUS CONTINUOUS
Status: DISCONTINUED | OUTPATIENT
Start: 2024-07-06 | End: 2024-07-07

## 2024-07-06 RX ADMIN — FERROUS SULFATE TAB 325 MG (65 MG ELEMENTAL FE) 325 MG: 325 (65 FE) TAB at 08:03

## 2024-07-06 RX ADMIN — FLUOXETINE HYDROCHLORIDE 40 MG: 20 CAPSULE ORAL at 08:02

## 2024-07-06 RX ADMIN — INSULIN LISPRO 2 UNITS: 100 INJECTION, SOLUTION INTRAVENOUS; SUBCUTANEOUS at 11:35

## 2024-07-06 RX ADMIN — TAMSULOSIN HYDROCHLORIDE 0.4 MG: 0.4 CAPSULE ORAL at 19:36

## 2024-07-06 RX ADMIN — CYANOCOBALAMIN TAB 1000 MCG 500 MCG: 1000 TAB at 08:03

## 2024-07-06 RX ADMIN — Medication 1000 UNITS: at 08:02

## 2024-07-06 RX ADMIN — ACETAMINOPHEN 650 MG: 325 TABLET ORAL at 06:41

## 2024-07-06 RX ADMIN — AMLODIPINE BESYLATE 10 MG: 10 TABLET ORAL at 08:02

## 2024-07-06 RX ADMIN — DONEPEZIL HYDROCHLORIDE 10 MG: 5 TABLET, FILM COATED ORAL at 19:36

## 2024-07-06 RX ADMIN — LEVOTHYROXINE SODIUM 100 MCG: 0.1 TABLET ORAL at 06:23

## 2024-07-06 RX ADMIN — ATORVASTATIN CALCIUM 20 MG: 20 TABLET, FILM COATED ORAL at 19:36

## 2024-07-06 RX ADMIN — SODIUM CHLORIDE: 9 INJECTION, SOLUTION INTRAVENOUS at 06:26

## 2024-07-06 RX ADMIN — OXYCODONE HYDROCHLORIDE AND ACETAMINOPHEN 1 TABLET: 5; 325 TABLET ORAL at 10:38

## 2024-07-06 RX ADMIN — INSULIN LISPRO 2 UNITS: 100 INJECTION, SOLUTION INTRAVENOUS; SUBCUTANEOUS at 08:04

## 2024-07-06 ASSESSMENT — PAIN SCALES - GENERAL
PAINLEVEL_OUTOF10: 3
PAINLEVEL_OUTOF10: 2
PAINLEVEL_OUTOF10: 3
PAINLEVEL_OUTOF10: 2
PAINLEVEL_OUTOF10: 7

## 2024-07-06 ASSESSMENT — PAIN DESCRIPTION - DESCRIPTORS
DESCRIPTORS: ACHING
DESCRIPTORS: ACHING

## 2024-07-06 ASSESSMENT — PAIN DESCRIPTION - ORIENTATION
ORIENTATION: RIGHT
ORIENTATION: RIGHT

## 2024-07-06 ASSESSMENT — PAIN DESCRIPTION - PAIN TYPE: TYPE: ACUTE PAIN;SURGICAL PAIN

## 2024-07-06 ASSESSMENT — PAIN - FUNCTIONAL ASSESSMENT: PAIN_FUNCTIONAL_ASSESSMENT: PREVENTS OR INTERFERES WITH MANY ACTIVE NOT PASSIVE ACTIVITIES

## 2024-07-06 ASSESSMENT — PAIN DESCRIPTION - LOCATION
LOCATION: KNEE
LOCATION: HIP

## 2024-07-06 NOTE — CONSENT
Informed Consent for Blood Component Transfusion Note    I have discussed with the patient the rationale for blood component transfusion; its benefits in treating or preventing fatigue, organ damage, or death; and its risk which includes mild transfusion reactions, rare risk of blood borne infection, or more serious but rare reactions. I have discussed the alternatives to transfusion, including the risk and consequences of not receiving transfusion. The patient had an opportunity to ask questions and had agreed to proceed with transfusion of blood components.    Electronically signed by Isidro Bell DO on 7/6/24 at 6:15 AM EDT

## 2024-07-06 NOTE — PLAN OF CARE
Problem: Discharge Planning  Goal: Discharge to home or other facility with appropriate resources  Outcome: Progressing     Problem: Pain  Goal: Verbalizes/displays adequate comfort level or baseline comfort level  Outcome: Progressing     Problem: Musculoskeletal - Adult  Goal: Return mobility to safest level of function  Outcome: Progressing     Problem: Musculoskeletal - Adult  Goal: Maintain proper alignment of affected body part  Outcome: Progressing     Problem: Safety - Adult  Goal: Free from fall injury  Outcome: Progressing     Problem: ABCDS Injury Assessment  Goal: Absence of physical injury  Outcome: Progressing     Problem: Chronic Conditions and Co-morbidities  Goal: Patient's chronic conditions and co-morbidity symptoms are monitored and maintained or improved  Outcome: Progressing

## 2024-07-07 LAB
ABO/RH: NORMAL
ALBUMIN SERPL-MCNC: 3 G/DL (ref 3.5–5.2)
ALP SERPL-CCNC: 81 U/L (ref 40–129)
ALT SERPL-CCNC: 9 U/L (ref 0–40)
ANION GAP SERPL CALCULATED.3IONS-SCNC: 12 MMOL/L (ref 7–16)
ANTIBODY SCREEN: NEGATIVE
ARM BAND NUMBER: NORMAL
AST SERPL-CCNC: 25 U/L (ref 0–39)
BASOPHILS # BLD: 0.07 K/UL (ref 0–0.2)
BASOPHILS NFR BLD: 1 % (ref 0–2)
BILIRUB SERPL-MCNC: 0.8 MG/DL (ref 0–1.2)
BLOOD BANK BLOOD PRODUCT EXPIRATION DATE: NORMAL
BLOOD BANK DISPENSE STATUS: NORMAL
BLOOD BANK ISBT PRODUCT BLOOD TYPE: 6200
BLOOD BANK PRODUCT CODE: NORMAL
BLOOD BANK SAMPLE EXPIRATION: NORMAL
BLOOD BANK UNIT TYPE AND RH: NORMAL
BPU ID: NORMAL
BUN SERPL-MCNC: 17 MG/DL (ref 6–23)
CALCIUM SERPL-MCNC: 8 MG/DL (ref 8.6–10.2)
CHLORIDE SERPL-SCNC: 104 MMOL/L (ref 98–107)
CO2 SERPL-SCNC: 22 MMOL/L (ref 22–29)
COMPONENT: NORMAL
CREAT SERPL-MCNC: 1.1 MG/DL (ref 0.7–1.2)
CROSSMATCH RESULT: NORMAL
EOSINOPHIL # BLD: 0.43 K/UL (ref 0.05–0.5)
EOSINOPHILS RELATIVE PERCENT: 4 % (ref 0–6)
ERYTHROCYTE [DISTWIDTH] IN BLOOD BY AUTOMATED COUNT: 16.1 % (ref 11.5–15)
GFR, ESTIMATED: 71 ML/MIN/1.73M2
GLUCOSE BLD-MCNC: 163 MG/DL (ref 74–99)
GLUCOSE BLD-MCNC: 180 MG/DL (ref 74–99)
GLUCOSE BLD-MCNC: 208 MG/DL (ref 74–99)
GLUCOSE BLD-MCNC: 252 MG/DL (ref 74–99)
GLUCOSE SERPL-MCNC: 174 MG/DL (ref 74–99)
HCT VFR BLD AUTO: 24.5 % (ref 37–54)
HGB BLD-MCNC: 8.1 G/DL (ref 12.5–16.5)
IMM GRANULOCYTES # BLD AUTO: 0.05 K/UL (ref 0–0.58)
IMM GRANULOCYTES NFR BLD: 1 % (ref 0–5)
LYMPHOCYTES NFR BLD: 1.87 K/UL (ref 1.5–4)
LYMPHOCYTES RELATIVE PERCENT: 18 % (ref 20–42)
MCH RBC QN AUTO: 29.2 PG (ref 26–35)
MCHC RBC AUTO-ENTMCNC: 33.1 G/DL (ref 32–34.5)
MCV RBC AUTO: 88.4 FL (ref 80–99.9)
MONOCYTES NFR BLD: 1.22 K/UL (ref 0.1–0.95)
MONOCYTES NFR BLD: 12 % (ref 2–12)
NEUTROPHILS NFR BLD: 66 % (ref 43–80)
NEUTS SEG NFR BLD: 6.97 K/UL (ref 1.8–7.3)
PLATELET # BLD AUTO: 223 K/UL (ref 130–450)
PMV BLD AUTO: 11.7 FL (ref 7–12)
POTASSIUM SERPL-SCNC: 4 MMOL/L (ref 3.5–5)
PROT SERPL-MCNC: 6 G/DL (ref 6.4–8.3)
RBC # BLD AUTO: 2.77 M/UL (ref 3.8–5.8)
SODIUM SERPL-SCNC: 138 MMOL/L (ref 132–146)
TRANSFUSION STATUS: NORMAL
UNIT DIVISION: 0
UNIT ISSUE DATE/TIME: NORMAL
WBC OTHER # BLD: 10.6 K/UL (ref 4.5–11.5)

## 2024-07-07 PROCEDURE — 97530 THERAPEUTIC ACTIVITIES: CPT

## 2024-07-07 PROCEDURE — 1200000000 HC SEMI PRIVATE

## 2024-07-07 PROCEDURE — 6370000000 HC RX 637 (ALT 250 FOR IP): Performed by: NURSE PRACTITIONER

## 2024-07-07 PROCEDURE — 97110 THERAPEUTIC EXERCISES: CPT

## 2024-07-07 PROCEDURE — 2580000003 HC RX 258: Performed by: ORTHOPAEDIC SURGERY

## 2024-07-07 PROCEDURE — 80053 COMPREHEN METABOLIC PANEL: CPT

## 2024-07-07 PROCEDURE — 82962 GLUCOSE BLOOD TEST: CPT

## 2024-07-07 PROCEDURE — 85025 COMPLETE CBC W/AUTO DIFF WBC: CPT

## 2024-07-07 PROCEDURE — 6370000000 HC RX 637 (ALT 250 FOR IP): Performed by: ORTHOPAEDIC SURGERY

## 2024-07-07 PROCEDURE — 36415 COLL VENOUS BLD VENIPUNCTURE: CPT

## 2024-07-07 RX ADMIN — CYANOCOBALAMIN TAB 1000 MCG 500 MCG: 1000 TAB at 08:37

## 2024-07-07 RX ADMIN — FERROUS SULFATE TAB 325 MG (65 MG ELEMENTAL FE) 325 MG: 325 (65 FE) TAB at 08:37

## 2024-07-07 RX ADMIN — Medication 1000 UNITS: at 08:37

## 2024-07-07 RX ADMIN — DONEPEZIL HYDROCHLORIDE 10 MG: 5 TABLET, FILM COATED ORAL at 20:23

## 2024-07-07 RX ADMIN — OXYCODONE HYDROCHLORIDE AND ACETAMINOPHEN 1 TABLET: 5; 325 TABLET ORAL at 20:24

## 2024-07-07 RX ADMIN — LISINOPRIL 2.5 MG: 5 TABLET ORAL at 08:37

## 2024-07-07 RX ADMIN — OXYCODONE HYDROCHLORIDE AND ACETAMINOPHEN 1 TABLET: 5; 325 TABLET ORAL at 09:14

## 2024-07-07 RX ADMIN — INSULIN LISPRO 4 UNITS: 100 INJECTION, SOLUTION INTRAVENOUS; SUBCUTANEOUS at 11:47

## 2024-07-07 RX ADMIN — SODIUM CHLORIDE, PRESERVATIVE FREE 10 ML: 5 INJECTION INTRAVENOUS at 21:00

## 2024-07-07 RX ADMIN — AMLODIPINE BESYLATE 10 MG: 10 TABLET ORAL at 08:37

## 2024-07-07 RX ADMIN — INSULIN LISPRO 2 UNITS: 100 INJECTION, SOLUTION INTRAVENOUS; SUBCUTANEOUS at 15:44

## 2024-07-07 RX ADMIN — LEVOTHYROXINE SODIUM 100 MCG: 0.1 TABLET ORAL at 05:16

## 2024-07-07 RX ADMIN — ATORVASTATIN CALCIUM 20 MG: 20 TABLET, FILM COATED ORAL at 20:23

## 2024-07-07 RX ADMIN — TAMSULOSIN HYDROCHLORIDE 0.4 MG: 0.4 CAPSULE ORAL at 20:23

## 2024-07-07 RX ADMIN — ASPIRIN 325 MG: 325 TABLET, COATED ORAL at 08:37

## 2024-07-07 RX ADMIN — FLUOXETINE HYDROCHLORIDE 40 MG: 20 CAPSULE ORAL at 08:37

## 2024-07-07 ASSESSMENT — PAIN SCALES - GENERAL
PAINLEVEL_OUTOF10: 3
PAINLEVEL_OUTOF10: 1
PAINLEVEL_OUTOF10: 1
PAINLEVEL_OUTOF10: 5

## 2024-07-07 ASSESSMENT — PAIN DESCRIPTION - ORIENTATION
ORIENTATION: RIGHT
ORIENTATION: RIGHT

## 2024-07-07 ASSESSMENT — PAIN DESCRIPTION - PAIN TYPE
TYPE: SURGICAL PAIN
TYPE: ACUTE PAIN;SURGICAL PAIN

## 2024-07-07 ASSESSMENT — PAIN - FUNCTIONAL ASSESSMENT: PAIN_FUNCTIONAL_ASSESSMENT: PREVENTS OR INTERFERES SOME ACTIVE ACTIVITIES AND ADLS

## 2024-07-07 ASSESSMENT — PAIN DESCRIPTION - LOCATION
LOCATION: HIP;LEG
LOCATION: KNEE;LEG

## 2024-07-07 ASSESSMENT — PAIN DESCRIPTION - DESCRIPTORS
DESCRIPTORS: ACHING;JABBING;SHOOTING
DESCRIPTORS: DISCOMFORT

## 2024-07-07 NOTE — PLAN OF CARE
Problem: Discharge Planning  Goal: Discharge to home or other facility with appropriate resources  Outcome: Progressing     Problem: Pain  Goal: Verbalizes/displays adequate comfort level or baseline comfort level  Outcome: Progressing     Problem: Musculoskeletal - Adult  Goal: Return mobility to safest level of function  Outcome: Progressing  Goal: Maintain proper alignment of affected body part  Outcome: Progressing     Problem: Safety - Adult  Goal: Free from fall injury  Outcome: Progressing     Problem: ABCDS Injury Assessment  Goal: Absence of physical injury  Outcome: Progressing     Problem: Chronic Conditions and Co-morbidities  Goal: Patient's chronic conditions and co-morbidity symptoms are monitored and maintained or improved  Outcome: Progressing      Discharged

## 2024-07-08 VITALS
WEIGHT: 245 LBS | RESPIRATION RATE: 16 BRPM | DIASTOLIC BLOOD PRESSURE: 62 MMHG | HEART RATE: 99 BPM | OXYGEN SATURATION: 93 % | SYSTOLIC BLOOD PRESSURE: 133 MMHG | TEMPERATURE: 97.8 F | HEIGHT: 70 IN | BODY MASS INDEX: 35.07 KG/M2

## 2024-07-08 LAB
ALBUMIN SERPL-MCNC: 3 G/DL (ref 3.5–5.2)
ALP SERPL-CCNC: 83 U/L (ref 40–129)
ALT SERPL-CCNC: 14 U/L (ref 0–40)
ANION GAP SERPL CALCULATED.3IONS-SCNC: 11 MMOL/L (ref 7–16)
AST SERPL-CCNC: 27 U/L (ref 0–39)
BASOPHILS # BLD: 0.09 K/UL (ref 0–0.2)
BASOPHILS NFR BLD: 1 % (ref 0–2)
BILIRUB SERPL-MCNC: 0.8 MG/DL (ref 0–1.2)
BUN SERPL-MCNC: 18 MG/DL (ref 6–23)
CALCIUM SERPL-MCNC: 8.4 MG/DL (ref 8.6–10.2)
CHLORIDE SERPL-SCNC: 103 MMOL/L (ref 98–107)
CO2 SERPL-SCNC: 24 MMOL/L (ref 22–29)
CREAT SERPL-MCNC: 1.1 MG/DL (ref 0.7–1.2)
EOSINOPHIL # BLD: 0.72 K/UL (ref 0.05–0.5)
EOSINOPHILS RELATIVE PERCENT: 7 % (ref 0–6)
ERYTHROCYTE [DISTWIDTH] IN BLOOD BY AUTOMATED COUNT: 16.3 % (ref 11.5–15)
GFR, ESTIMATED: 71 ML/MIN/1.73M2
GLUCOSE BLD-MCNC: 190 MG/DL (ref 74–99)
GLUCOSE BLD-MCNC: 279 MG/DL (ref 74–99)
GLUCOSE SERPL-MCNC: 170 MG/DL (ref 74–99)
HCT VFR BLD AUTO: 25.9 % (ref 37–54)
HGB BLD-MCNC: 8.3 G/DL (ref 12.5–16.5)
IMM GRANULOCYTES # BLD AUTO: 0.06 K/UL (ref 0–0.58)
IMM GRANULOCYTES NFR BLD: 1 % (ref 0–5)
LYMPHOCYTES NFR BLD: 2.12 K/UL (ref 1.5–4)
LYMPHOCYTES RELATIVE PERCENT: 21 % (ref 20–42)
MCH RBC QN AUTO: 28.6 PG (ref 26–35)
MCHC RBC AUTO-ENTMCNC: 32 G/DL (ref 32–34.5)
MCV RBC AUTO: 89.3 FL (ref 80–99.9)
MICROORGANISM SPEC CULT: NORMAL
MICROORGANISM SPEC CULT: NORMAL
MONOCYTES NFR BLD: 1.17 K/UL (ref 0.1–0.95)
MONOCYTES NFR BLD: 12 % (ref 2–12)
NEUTROPHILS NFR BLD: 58 % (ref 43–80)
NEUTS SEG NFR BLD: 5.79 K/UL (ref 1.8–7.3)
PLATELET # BLD AUTO: 263 K/UL (ref 130–450)
PMV BLD AUTO: 12 FL (ref 7–12)
POTASSIUM SERPL-SCNC: 4.2 MMOL/L (ref 3.5–5)
PROT SERPL-MCNC: 6.2 G/DL (ref 6.4–8.3)
RBC # BLD AUTO: 2.9 M/UL (ref 3.8–5.8)
SERVICE CMNT-IMP: NORMAL
SERVICE CMNT-IMP: NORMAL
SODIUM SERPL-SCNC: 138 MMOL/L (ref 132–146)
SPECIMEN DESCRIPTION: NORMAL
SPECIMEN DESCRIPTION: NORMAL
WBC OTHER # BLD: 10 K/UL (ref 4.5–11.5)

## 2024-07-08 PROCEDURE — 97535 SELF CARE MNGMENT TRAINING: CPT

## 2024-07-08 PROCEDURE — 6370000000 HC RX 637 (ALT 250 FOR IP): Performed by: NURSE PRACTITIONER

## 2024-07-08 PROCEDURE — 97530 THERAPEUTIC ACTIVITIES: CPT

## 2024-07-08 PROCEDURE — 80053 COMPREHEN METABOLIC PANEL: CPT

## 2024-07-08 PROCEDURE — 36415 COLL VENOUS BLD VENIPUNCTURE: CPT

## 2024-07-08 PROCEDURE — 82962 GLUCOSE BLOOD TEST: CPT

## 2024-07-08 PROCEDURE — 6370000000 HC RX 637 (ALT 250 FOR IP): Performed by: ORTHOPAEDIC SURGERY

## 2024-07-08 PROCEDURE — 97110 THERAPEUTIC EXERCISES: CPT

## 2024-07-08 PROCEDURE — 2580000003 HC RX 258: Performed by: ORTHOPAEDIC SURGERY

## 2024-07-08 PROCEDURE — 85025 COMPLETE CBC W/AUTO DIFF WBC: CPT

## 2024-07-08 RX ORDER — TAMSULOSIN HYDROCHLORIDE 0.4 MG/1
0.4 CAPSULE ORAL NIGHTLY
Qty: 30 CAPSULE | Refills: 3 | DISCHARGE
Start: 2024-07-08

## 2024-07-08 RX ADMIN — SODIUM CHLORIDE, PRESERVATIVE FREE 10 ML: 5 INJECTION INTRAVENOUS at 09:18

## 2024-07-08 RX ADMIN — AMLODIPINE BESYLATE 10 MG: 10 TABLET ORAL at 09:12

## 2024-07-08 RX ADMIN — Medication 1000 UNITS: at 09:12

## 2024-07-08 RX ADMIN — FERROUS SULFATE TAB 325 MG (65 MG ELEMENTAL FE) 325 MG: 325 (65 FE) TAB at 09:11

## 2024-07-08 RX ADMIN — OXYCODONE HYDROCHLORIDE AND ACETAMINOPHEN 1 TABLET: 5; 325 TABLET ORAL at 05:58

## 2024-07-08 RX ADMIN — ASPIRIN 325 MG: 325 TABLET, COATED ORAL at 09:12

## 2024-07-08 RX ADMIN — INSULIN LISPRO 4 UNITS: 100 INJECTION, SOLUTION INTRAVENOUS; SUBCUTANEOUS at 11:36

## 2024-07-08 RX ADMIN — LISINOPRIL 2.5 MG: 5 TABLET ORAL at 09:12

## 2024-07-08 RX ADMIN — CYANOCOBALAMIN TAB 1000 MCG 500 MCG: 1000 TAB at 09:12

## 2024-07-08 RX ADMIN — FLUOXETINE HYDROCHLORIDE 40 MG: 20 CAPSULE ORAL at 09:11

## 2024-07-08 RX ADMIN — LEVOTHYROXINE SODIUM 100 MCG: 0.1 TABLET ORAL at 05:41

## 2024-07-08 ASSESSMENT — PAIN SCALES - GENERAL
PAINLEVEL_OUTOF10: 5
PAINLEVEL_OUTOF10: 1

## 2024-07-08 ASSESSMENT — PAIN DESCRIPTION - LOCATION: LOCATION: HIP

## 2024-07-08 ASSESSMENT — PAIN DESCRIPTION - DESCRIPTORS: DESCRIPTORS: ACHING;JABBING;NAGGING

## 2024-07-08 ASSESSMENT — PAIN DESCRIPTION - ORIENTATION: ORIENTATION: RIGHT

## 2024-07-08 NOTE — CARE COORDINATION
Patient is approved by insurance for DC to [facility].  Have arranged transport via [facility] for 11:30am.  Wife at Bedside and both she and patient notified of DC and time.  JOSH done, JORGE signed.

## 2024-07-08 NOTE — PLAN OF CARE
Problem: Discharge Planning  Goal: Discharge to home or other facility with appropriate resources  7/7/2024 2357 by Omid Narvaez RN  Outcome: Progressing     Problem: Pain  Goal: Verbalizes/displays adequate comfort level or baseline comfort level  7/7/2024 2357 by Omid Narvaez RN  Outcome: Progressing     Problem: Musculoskeletal - Adult  Goal: Return mobility to safest level of function  7/7/2024 2357 by Omid Narvaez RN  Outcome: Progressing     Problem: Musculoskeletal - Adult  Goal: Maintain proper alignment of affected body part  7/7/2024 2357 by Omid Narvaez RN  Outcome: Progressing     Problem: Safety - Adult  Goal: Free from fall injury  7/7/2024 2357 by Omid Narvaez RN  Outcome: Progressing     Problem: ABCDS Injury Assessment  Goal: Absence of physical injury  7/7/2024 2357 by Omid Narvaez RN  Outcome: Progressing     Problem: Chronic Conditions and Co-morbidities  Goal: Patient's chronic conditions and co-morbidity symptoms are monitored and maintained or improved  7/7/2024 2357 by Omid Narvaez RN  Outcome: Progressing

## 2024-07-08 NOTE — PLAN OF CARE
Problem: Discharge Planning  Goal: Discharge to home or other facility with appropriate resources  7/8/2024 1158 by Shasha Patel, RN  Outcome: Completed  7/7/2024 2847 by Omid Narvaez, RN  Outcome: Progressing

## 2024-07-09 NOTE — DISCHARGE SUMMARY
[address] [address]                            DISCHARGE SUMMARY      PATIENT NAME: PATRICIA PONCE                  : 1950  MED REC NO: [id number]                        ROOM: 0322  ACCOUNT NO: [id number]                       ADMIT DATE: 2024  PROVIDER: Luis Felipe Vines DO      ADMITTING DIAGNOSES:  Mechanical fall resulting in a mildly displaced comminuted fracture of the distal femur spanning to a femur component, total right knee prosthesis, status post open reduction with right knee arthrotomy, interlocking retrograde intramedullary nail on July 3, 2024.    SECONDARY DISCHARGE DIAGNOSES:  Obstructive sleep apnea without the use of CPAP.  Coronary artery disease.  Non-insulin-dependent diabetes mellitus type 2.  Evolving dementia.  History of Marisa-en-Y gastric bypass.  History of splenectomy.  Hyperlipidemia.  Essential hypertension.  History of tobacco abuse.    COMPLICATION:  Postoperative blood-loss anemia.    OPERATIONS PERFORMED:  Open reduction and internal fixation with right knee arthrotomy, interlocking retrograde intramedullary nail on July 3, 2024, by Dr. Biggs.    CONSULTATION OBTAINED:  With Dr. Biggs.  No OMT was given.    ADDITIONAL ADMITTING PHYSICIAN:  Dr. Bell.    CHIEF COMPLAINT AND HISTORY OF CHIEF COMPLAINT:  This is a pleasant 73-year-old male who is admitted to [facility].  The patient presented to the hospital here after a mechanical fall.  The patient presented to the hospital here after a fall with injuring his right knee.  The patient does have a history of wound to the right knee with ambulation causing pain.  The patient presented to the hospital here.  He denies any other complaints.  The patient admitted to the hospital at this time.    PAST MEDICAL HISTORY:  Positive for usual childhood disease, history of coronary artery disease, dementia, hyperlipidemia,

## 2025-02-16 NOTE — PROGRESS NOTES
4 Eyes Skin Assessment     NAME:  Robby Phan  YOB: 1950  MEDICAL RECORD NUMBER:  [id number]    The patient is being assessed for  Admission    I agree that at least one RN has performed a thorough Head to Toe Skin Assessment on the patient. ALL assessment sites listed below have been assessed.      Areas assessed by both nurses:    Head, Face, Ears, Shoulders, Back, Chest, Arms, Elbows, Hands, Sacrum. Buttock, Coccyx, Ischium, and Legs. Feet and Heels        Does the Patient have a Wound? Yes wound(s) were present on assessment. LDA wound assessment was Initiated and completed by RN abrasion to R hip        Kaleb Prevention initiated by RN: No  Wound Care Orders initiated by RN: No    Pressure Injury (Stage 3,4, Unstageable, DTI, NWPT, and Complex wounds) if present, place Wound referral order by RN under : No    New Ostomies, if present place, Ostomy referral order under : No     Nurse 1 eSignature: Electronically signed by Rosio Douglas RN on 7/3/24 at 1:57 AM EDT    **SHARE this note so that the co-signing nurse can place an eSignature**    Nurse 2 eSignature: Electronically signed by Omid Narvaez RN on 7/3/24 at 4:04 AM EDT    
Day notified of brace order.  Information faxed.  
Department of Orthopedic Surgery  Resident Progress Note    POD #1 status post right retrograde femoral nail.  Patient seen and examined, reports pain was well-controlled about right lower extremity, no acute issues overnight.    VITALS:  BP (!) 145/70   Pulse 88   Temp 98.1 °F (36.7 °C) (Oral)   Resp 17   Ht 1.778 m (5' 10\")   Wt 111.1 kg (245 lb)   SpO2 92%   BMI 35.15 kg/m²     General: well-developed and well nourished, in no acute distress, and alert    MUSCULOSKELETAL:   right lower extremity:  Knee immobilizer and Ace wrap in place.  Clean dry and intact  Thigh/lower leg compartments soft and compressible  Calf soft and nontender  Demonstrates active plantarflexion, dorsiflexion, great toe extension  Capillary refill less than 2 seconds, foot warm well-perfused  Distal sensation grossly intact superficial and deep peroneal, tibial, sural, saphenous nerves      CBC:   Lab Results   Component Value Date/Time    WBC 10.2 07/03/2024 05:29 AM    HGB 9.1 07/03/2024 07:10 PM    HCT 28.9 07/03/2024 07:10 PM     07/03/2024 05:29 AM     PT/INR:    Lab Results   Component Value Date/Time    PROTIME 11.7 07/02/2024 06:39 PM    INR 1.1 07/02/2024 06:39 PM       ASSESSMENT  S/P right retrograde femur nail 7/3    PLAN      Continue physical therapy and protocol: WBAT -right LE  24 hour abx coverage   Deep venous thrombosis prophylaxis -aspirin 325 once daily, early mobilization  PT/OT  Pain Control: IV and PO, wean to p.o.  Monitor H&H  D/C Plan: Pending PT OT, primary.  Patient appropriate for discharge from orthopedic surgery standpoint, follow-up outpatient with Dr. Biggs 2 weeks    Electronically signed by Abiodun Bull DO on 7/4/2024 at 7:42 AM   
Department of Orthopedic Surgery  Resident Progress Note    POD #2 status post right retrograde femoral nail.  Patient seen and examined, reports mild pain about distal thigh.  No other acute complaints overnight.    VITALS:  /60   Pulse 87   Temp 98.5 °F (36.9 °C) (Oral)   Resp 16   Ht 1.778 m (5' 10\")   Wt 111.1 kg (245 lb)   SpO2 98%   BMI 35.15 kg/m²     General: well-developed and well nourished, in no acute distress, and alert    MUSCULOSKELETAL:   right lower extremity:  Knee immobilizer and Ace wrap in place.  Clean dry and intact  Thigh/lower leg compartments soft and compressible  Calf soft and nontender  Demonstrates active plantarflexion, dorsiflexion, great toe extension  Capillary refill less than 2 seconds, foot warm well-perfused  Distal sensation grossly intact superficial and deep peroneal, tibial, sural, saphenous nerves      CBC:   Lab Results   Component Value Date/Time    WBC 13.9 07/05/2024 03:50 AM    HGB 7.5 07/05/2024 03:50 AM    HCT 22.9 07/05/2024 03:50 AM     07/05/2024 03:50 AM     PT/INR:    Lab Results   Component Value Date/Time    PROTIME 11.7 07/02/2024 06:39 PM    INR 1.1 07/02/2024 06:39 PM       ASSESSMENT  S/P right retrograde femur nail 7/3    PLAN      Continue physical therapy and protocol: WBAT -right LE  24 hour abx coverage -complete  Deep venous thrombosis prophylaxis -aspirin 325 once daily, early mobilization  PT/OT  Pain Control: IV and PO, wean to p.o.  Monitor H&H  D/C Plan: Pending PT OT, primary.  Once to hinged knee ROM brace in place, patient appropriate for discharge from orthopedic surgery standpoint, follow-up outpatient with Dr. Biggs 2 weeks  Orthopedic surgery will follow from periphery for remainder of visit, please contact with questions or concerns     Electronically signed by Abiodun Bull DO on 7/5/2024 at 8:37 AM   
Internal Medicine Progress Note     [facility]     Luis Felipe Vines D.O., LI Bell D.O., LI Lancaster D.O.     Tiana Marie, MSN, APRN, NP-C  Taiwo Newsome, MSN, APRN-CNP  Atif Soliman, MSN, APRN, NP-C  Rosana Liu, MSN, APRN-CNP  Tammy Raza, MSN, APRN, NP-C     Primary Care Physician: William Rao, APRN - CNP   Admitting Physician:  Luis Felipe Vines DO  Admission date and time: 7/2/2024  2:56 PM    Room:  [address] [place] [address]  Admitting diagnosis: Pre-op chest exam [Z01.811]  Open comminuted intra-articular fracture of distal femur, left, type I or II, initial encounter ([facility]) [S72.492B]    Patient Name: Robby Phan  MRN: [id number]    Date of Service: 7/4/2024     Subjective:  Robby is a 73 y.o. male who was seen and examined today,7/4/2024, at the bedside.  Robby tolerated orthopedic intervention yesterday without complication.  He describes the expected amount of postoperative hip pain.  He will work with the therapy teams and his goal is for discharge home tomorrow.  No family members were present during my examination.    Review of System:   Constitutional:   Denies fever or chills, weight loss or gain, fatigue or malaise.  HEENT:   Denies ear pain, sore throat, sinus or eye problems.  Cardiovascular:   Denies any chest pain, irregular heartbeats, or palpitations.   Respiratory:   Denies shortness of breath, coughing, sputum production, hemoptysis, or wheezing.  Gastrointestinal:   Denies nausea, vomiting, diarrhea, or constipation.  Denies any abdominal pain.  Genitourinary:    Denies any urgency, frequency, hematuria. Voiding  without difficulty.  Extremities:   Denies lower extremity swelling, edema or cyanosis.  Admits to postoperative hip pain as to be expected.  Neurology:    Denies any headache or focal neurological deficits, some degree of weakness and deconditioning.  Psch:   Denies being anxious or 
Internal Medicine Progress Note     [facility]     Luis Felipe Vines D.O., LI Bell D.O., LI Lancaster D.O.     Tiana Marie, MSN, APRN, NP-C  Taiwo Newsome, MSN, APRN-CNP  Atif Soliman, MSN, APRN, NP-C  Rosana Liu, MSN, APRN-CNP  Tammy Raza, MSN, APRN, NP-C     Primary Care Physician: William Rao, APRN - CNP   Admitting Physician:  Luis Felipe Vines DO  Admission date and time: 7/2/2024  2:56 PM    Room:  [address] [place] [address]  Admitting diagnosis: Pre-op chest exam [Z01.811]  Open comminuted intra-articular fracture of distal femur, left, type I or II, initial encounter ([facility]) [S72.492B]    Patient Name: Robby Phan  MRN: [id number]    Date of Service: 7/3/2024     Subjective:  Robby is a 73 y.o. male who was seen and examined today,7/3/2024, at the bedside.  Robby presented to [facility] emergency department yesterday after suffering a mechanical fall in the parking lot.  Right distal periprosthetic femoral fracture was identified and he is scheduled to undergo surgical intervention today.  We discussed his past medical history at length pain particular attention to his cardiovascular status.  No family members were present during my examination.    Review of System:   Constitutional:   Denies fever or chills, weight loss or gain, fatigue or malaise.  HEENT:   Denies ear pain, sore throat, sinus or eye problems.  Cardiovascular:   Denies any chest pain, irregular heartbeats, or palpitations.   Respiratory:   Denies shortness of breath, coughing, sputum production, hemoptysis, or wheezing.  Gastrointestinal:   Denies nausea, vomiting, diarrhea, or constipation.  Denies any abdominal pain.  Genitourinary:    Denies any urgency, frequency, hematuria. Voiding  without difficulty.  Extremities:   Denies lower extremity swelling, edema or cyanosis.  Pain associated with the femoral fracture  Neurology:    Denies any headache or focal 
Internal Medicine Progress Note     [facility]     Luis Felipe Vines D.O., LI Bell D.O., LI Lancaster D.O.     Tiana Marie, MSN, APRN, NP-C  Taiwo Newsome, MSN, APRN-CNP  Atif Soliman, MSN, APRN, NP-C  Rosana Liu, MSN, APRN-CNP  Tammy Raza, MSN, APRN, NP-C     Primary Care Physician: William Rao, APRN - CNP   Admitting Physician:  Luis Felipe Vines DO  Admission date and time: 7/2/2024  2:56 PM    Room:  [address] [place] [address]  Admitting diagnosis: Pre-op chest exam [Z01.811]  Open comminuted intra-articular fracture of distal femur, left, type I or II, initial encounter ([facility]) [S72.492B]    Patient Name: Robby Phan  MRN: [id number]    Date of Service: 7/6/2024     Subjective:  Robby is a 73 y.o. male who was seen and examined today,7/6/2024, at the bedside.  Robby remains profoundly weak and deconditioned.  He remains agreeable to temporary rehabilitation.  Hemoglobin continues to trend downward without outward signs of GI bleeding.  I have asked that the orthopedic team be updated for further evaluation.  I would like to move forward with contrasted CT scan of the postoperative site as well as abdomen and pelvis but I am unable to do so today in the setting of renal insufficiency.  We discussed packed red blood cell transfusion.    Review of System:   Constitutional:   Denies fever or chills, weight loss or gain, fatigue or malaise.  HEENT:   Denies ear pain, sore throat, sinus or eye problems.  Nasal cannula oxygen is in place.  Cardiovascular:   Denies any chest pain, irregular heartbeats, or palpitations.   Respiratory:   Denies shortness of breath, coughing, sputum production, hemoptysis, or wheezing.  Chronic shortness of breath.  Gastrointestinal:   Denies nausea, vomiting, diarrhea, or constipation.  Denies any abdominal pain.  No melanotic stool or bright red rectal bleeding.  No bowel movement for several 
Morphine was given to this pt on 7-3-24.  
Nurse to nurse given to Ronel at [facility]  
OCCUPATIONAL THERAPY INITIAL EVALUATION    [facility]  [address] [place]        Date:2024                                                  Patient Name: Robby Phan    MRN: [id number]    : 1950    Room: [address] [place] [address]      Evaluating OT: Edyta Manley OTR/L; [id number]     Referring Provider and Specific Provider Orders/Date:      24  OT eval and treat  Start:  24,   End:  24,   ONE TIME,   Standing Count:  1 Occurrences,   R         Abiodun Bull, DO      Placement Recommendation: Subacute        Diagnosis:   1. Pre-op chest exam    2. Open comminuted intra-articular fracture of distal femur, left, type I or II, initial encounter ([facility])        OPERATIONS: Open reduction and internal fixation of distal femur fracture with right knee arthrotomy and interlocking retrograde intramedullary nail.      Pertinent Medical History:       Past Medical History:   Diagnosis Date    CAD (coronary artery disease)     Dementia ([facility])     early stages  just short term memory loss currently    DM (diabetes mellitus) ([facility])     Hx of cardiovascular stress test 2020    Hyperlipidemia     Hypertension     Moderate episode of recurrent major depressive disorder ([facility]) 2018    Myocardial infarction ([facility]) 1995    slight mild    Osteoarthritis     Sleep apnea     does have machine- uses occasionaly         Past Surgical History:   Procedure Laterality Date    ANTERIOR CRUCIATE LIGAMENT REPAIR   left knee    COLONOSCOPY      FOOT SURGERY Right 2022    HIGH ENERGY EXTRACORPEAL SHOCKWAVE THERAPY RIGHT FOOT performed by Jair Cochran Jr., DPM at [facility] OR    HEEL SPUR SURGERY Right 2021    RESECTION EXOSTOSIS RIGHT FOOT, REPAIR ACHILLES TENDON RIGHT (CPT 49724) ([facility]) performed by Jair Cochran Jr., DPM at [facility] OR    INGUINAL HERNIA REPAIR   &     JOINT REPLACEMENT  2011 right    right 
OCCUPATIONAL THERAPY TREATMENT NOTE    [facility]   [address] [facility] [address] [facility]        Date:2024  Patient Name: Robby Phan  MRN: [id number]  : 1950  Room: [address] [place] [address]       Evaluating OT: Edyta Manley OTR/L; [id number]      Referring Provider and Specific Provider Orders/Date:      24   OT eval and treat  Start:  24,   End:  24,   ONE TIME,   Standing Count:  1 Occurrences,   R         Abiodun Bull, DO       Placement Recommendation: Subacute         Diagnosis:   1. Pre-op chest exam    2. Open comminuted intra-articular fracture of distal femur, left, type I or II, initial encounter ([facility])        OPERATIONS: Open reduction and internal fixation of distal femur fracture with right knee arthrotomy and interlocking retrograde intramedullary nail.       Pertinent Medical History:       Past Medical History        Past Medical History:   Diagnosis Date    CAD (coronary artery disease)      Dementia ([facility])       early stages  just short term memory loss currently    DM (diabetes mellitus) ([facility])      Hx of cardiovascular stress test 2020    Hyperlipidemia      Hypertension      Moderate episode of recurrent major depressive disorder ([facility]) 2018    Myocardial infarction ([facility])      slight mild    Osteoarthritis      Sleep apnea       does have machine- uses occasionaly            Past Surgical History         Past Surgical History:   Procedure Laterality Date    ANTERIOR CRUCIATE LIGAMENT REPAIR   2002 left knee    COLONOSCOPY        FOOT SURGERY Right 2022     HIGH ENERGY EXTRACORPEAL SHOCKWAVE THERAPY RIGHT FOOT performed by Jair Cochran Jr., DPM at [facility] OR    HEEL SPUR SURGERY Right 2021     RESECTION EXOSTOSIS RIGHT FOOT, REPAIR ACHILLES TENDON RIGHT (CPT 20079) ([facility]) performed by Jair Cochran Jr., DPM at [facility] OR    INGUINAL HERNIA REPAIR    &     JOINT 
Physical Therapy Initial Evaluation/Plan of Care    Room #:  0322/0322-01  Patient Name: Robby Phan  YOB: 1950  MRN: [id number]    Date of Service: 7/4/2024     Tentative placement recommendation: Subacute unless patient meets goals then Home Health Physical Therapy  Equipment recommendation: Patient has needed equipment       Evaluating Physical Therapist: Durga Ramos PT Lic.  #[id number]      Specific Provider Orders/Date/Referring Provider :  07/04/24 0730    PT eval and treat  Start:  07/04/24 0730,   End:  07/04/24 0730,   ONE TIME,   Standing Count:  1 Occurrences,   R       Abiodun Bull P, DO    Admitting Diagnosis:   Pre-op chest exam [Z01.811]  Open comminuted intra-articular fracture of distal femur, left, type I or II, initial encounter ([facility]) [S72.492B]       Surgery:   right retrograde femoral nail.   Visit Diagnoses         Codes    Pre-op chest exam    -  Primary Z01.811            Patient Active Problem List   Diagnosis    Postsurgical nonabsorption    Numbness and tingling    Small vessel disease (HCC)    Cervical muscle pain    Hypercholesteremia    Carpal tunnel syndrome    Essential hypertension    Moderate episode of recurrent major depressive disorder (HCC)    Memory loss    Cerebral ischemia    Cognitive dysfunction    Exertional dyspnea    Severe obesity (BMI 35.0-35.9 with comorbidity) ([facility])    Venous insufficiency (chronic) (peripheral)    Dementia without behavioral disturbance (HCC)    Type 2 diabetes mellitus without complication, without long-term current use of insulin (HCC)    Type II diabetes mellitus with peripheral circulatory disorder (HCC)    Localized edema    Right foot pain    Achilles tendinosis of right lower extremity    Difficulty walking    GILDARDO (obstructive sleep apnea)    Tendinitis of right foot    Pain in right ankle and joints of right foot    Plantar fasciitis    Pain of right foot    Kidney stone    Hydronephrosis    Hydronephrosis due to obstruction of 
Physical Therapy Treatment Note/Plan of Care    Room #:  0322/0322-01  Patient Name: Robby Phan  YOB: 1950  MRN: [id number]    Date of Service: 7/7/2024     Tentative placement recommendation: Subacute Rehab  Equipment recommendation: Patient has needed equipment       Evaluating Physical Therapist: Durga Ramos PT Lic.  #[id number]      Specific Provider Orders/Date/Referring Provider :  07/04/24 0730    PT eval and treat  Start:  07/04/24 0730,   End:  07/04/24 0730,   ONE TIME,   Standing Count:  1 Occurrences,   R       Abiodun Bull,     Admitting Diagnosis:   Pre-op chest exam [Z01.811]  Open comminuted intra-articular fracture of distal femur, left, type I or II, initial encounter ([facility]) [S72.492B]       Surgery:   right retrograde femoral nail.   Visit Diagnoses         Codes    Pre-op chest exam    -  Primary Z01.811            Patient Active Problem List   Diagnosis    Postsurgical nonabsorption    Numbness and tingling    Small vessel disease (HCC)    Cervical muscle pain    Hypercholesteremia    Carpal tunnel syndrome    Essential hypertension    Moderate episode of recurrent major depressive disorder (HCC)    Memory loss    Cerebral ischemia    Cognitive dysfunction    Exertional dyspnea    Severe obesity (BMI 35.0-35.9 with comorbidity) ([facility])    Venous insufficiency (chronic) (peripheral)    Dementia without behavioral disturbance (HCC)    Type 2 diabetes mellitus without complication, without long-term current use of insulin (HCC)    Type II diabetes mellitus with peripheral circulatory disorder ([facility])    Localized edema    Right foot pain    Achilles tendinosis of right lower extremity    Difficulty walking    GILDARDO (obstructive sleep apnea)    Tendinitis of right foot    Pain in right ankle and joints of right foot    Plantar fasciitis    Pain of right foot    Kidney stone    Hydronephrosis    Hydronephrosis due to obstruction of bladder    Open comminuted intra-articular fracture of distal 
Physical Therapy Treatment Note/Plan of Care    Room #:  0322/0322-01  Patient Name: Robby Phan  YOB: 1950  MRN: [id number]    Date of Service: 7/5/2024     Tentative placement recommendation: Subacute unless patient meets goals then Home Health Physical Therapy  Equipment recommendation: Patient has needed equipment       Evaluating Physical Therapist: Durga Ramos PT Lic.  #[id number]      Specific Provider Orders/Date/Referring Provider :  07/04/24 0730    PT eval and treat  Start:  07/04/24 0730,   End:  07/04/24 0730,   ONE TIME,   Standing Count:  1 Occurrences,   R       Abiodun Bull P, DO    Admitting Diagnosis:   Pre-op chest exam [Z01.811]  Open comminuted intra-articular fracture of distal femur, left, type I or II, initial encounter ([facility]) [S72.492B]       Surgery:   right retrograde femoral nail.   Visit Diagnoses         Codes    Pre-op chest exam    -  Primary Z01.811            Patient Active Problem List   Diagnosis    Postsurgical nonabsorption    Numbness and tingling    Small vessel disease (HCC)    Cervical muscle pain    Hypercholesteremia    Carpal tunnel syndrome    Essential hypertension    Moderate episode of recurrent major depressive disorder ([facility])    Memory loss    Cerebral ischemia    Cognitive dysfunction    Exertional dyspnea    Severe obesity (BMI 35.0-35.9 with comorbidity) ([facility])    Venous insufficiency (chronic) (peripheral)    Dementia without behavioral disturbance (HCC)    Type 2 diabetes mellitus without complication, without long-term current use of insulin (HCC)    Type II diabetes mellitus with peripheral circulatory disorder (HCC)    Localized edema    Right foot pain    Achilles tendinosis of right lower extremity    Difficulty walking    GILDARDO (obstructive sleep apnea)    Tendinitis of right foot    Pain in right ankle and joints of right foot    Plantar fasciitis    Pain of right foot    Kidney stone    Hydronephrosis    Hydronephrosis due to obstruction of bladder 
normal color and texture.  Genitalia/Breast:  Deferred    Medication:  Scheduled Meds:   insulin lispro  0-8 Units SubCUTAneous TID WC    insulin lispro  0-4 Units SubCUTAneous Nightly    [Held by provider] lisinopril  2.5 mg Oral Daily    [Held by provider] glipiZIDE  5 mg Oral BID AC    vitamin B-12  500 mcg Oral Daily    Vitamin D  1,000 Units Oral Daily    ferrous sulfate  325 mg Oral Daily    [Held by provider] aspirin  325 mg Oral Daily    sodium chloride flush  5-40 mL IntraVENous 2 times per day    amLODIPine  10 mg Oral Daily    tamsulosin  0.4 mg Oral Nightly    levothyroxine  100 mcg Oral QAM    FLUoxetine  40 mg Oral Daily    donepezil  10 mg Oral Nightly    atorvastatin  20 mg Oral Nightly    [Held by provider] aspirin  81 mg Oral Daily     Continuous Infusions:   sodium chloride      sodium chloride 60 mL/hr at 07/06/24 0626    dextrose      sodium chloride         Objective Data:  CBC with Differential:    Lab Results   Component Value Date/Time    WBC 10.6 07/07/2024 04:53 AM    RBC 2.77 07/07/2024 04:53 AM    HGB 8.1 07/07/2024 04:53 AM    HCT 24.5 07/07/2024 04:53 AM     07/07/2024 04:53 AM    MCV 88.4 07/07/2024 04:53 AM    MCH 29.2 07/07/2024 04:53 AM    MCHC 33.1 07/07/2024 04:53 AM    RDW 16.1 07/07/2024 04:53 AM    NRBC 1 07/05/2024 03:50 AM    LYMPHOPCT 18 07/07/2024 04:53 AM    MONOPCT 12 07/07/2024 04:53 AM    EOSPCT 4 07/07/2024 04:53 AM    BASOPCT 1 07/07/2024 04:53 AM    MONOSABS 1.22 07/07/2024 04:53 AM    LYMPHSABS 1.87 07/07/2024 04:53 AM    EOSABS 0.43 07/07/2024 04:53 AM    BASOSABS 0.07 07/07/2024 04:53 AM     BMP:    Lab Results   Component Value Date/Time     07/07/2024 04:53 AM    K 4.0 07/07/2024 04:53 AM     07/07/2024 04:53 AM    CO2 22 07/07/2024 04:53 AM    BUN 17 07/07/2024 04:53 AM    CREATININE 1.1 07/07/2024 04:53 AM    CALCIUM 8.0 07/07/2024 04:53 AM    GFRAA >60 04/07/2022 08:23 AM    LABGLOM 71 07/07/2024 04:53 AM    LABGLOM >60 03/22/2024 09:11 AM 
      Assessment:  Mechanical fall resulting in mildly displaced comminuted fracture of the distal femur extending to the femoral component of the total knee prosthesis status post ORIF with right knee arthrotomy and interlocking retrograde intramedullary nail 7/3/2024  Postoperative blood loss anemia  Obstructive sleep apnea without use of CPAP  Coronary artery disease  Non-insulin-dependent diabetes mellitus type 2  Evolving dementia  History of Marisa-en-Y gastric bypass  History of splenectomy  Hyperlipidemia  Essential hypertension  History of tobacco abuse    Plan:   Robby describes more pain and weakness than initially expected.  As a result, he is now requesting rehabilitation upon discharge.  I will discuss the case with the discharge planning team.  There has been a downward trend in his hemoglobin compatible with blood loss anemia.  We will reassess and hemoglobin later this afternoon.  There is been a mild bump in his renal function and nephrotoxic medications will be placed on hold and IV fluid resuscitation will be undertaken.  He will work aggressively with the therapy teams.  He is acceptable for discharge to the nursing home facility once this can be arranged.    More than 50% of my time was spent at the bedside counseling/coordinating care with the patient and/or family with face to face contact.  This time was spent reviewing notes and laboratory data as well as instructing and counseling the patient. Time I spent with the family or surrogate(s) is included only if the patient was incapable of providing the necessary information or participating in medical decisions. I also discussed the differential diagnosis and all of the proposed management plans with the patient and individuals accompanying the patient. I am readily available for any further decision-making and intervention.       Isidro Bell DO, RENALDO.C.O.I.  7/5/2024  6:57 AM   
with Moderate assist of 1   for balance, upright, weight shift, and safety 3-4 heel toe steps using  wheeled walker with Moderate assist of 1   cues for sequencing, upright posture, safety, pursed lip breathing, and heel/toe shuffle on LLE     25 feet using  wheeled walker with Minimal assist of 1    Stair negotiation: ascended and descended   Not assessed     Not assessed    ROM Within functional limits    Increase range of motion 10% of affected joints    Strength BUE:  refer to OT eval  RLE:  3+/5 in immobilizer  LLE:  5/5  Increase strength in affected mm groups by 1/3 grade   Balance Sitting EOB:  good    Dynamic Standing:  fair with FWW Sitting EOB: fair +  Dynamic Standing: fair with wheeled walker   Sitting EOB:  good    Dynamic Standing: good with FWW     Patient is Alert & Oriented x person, place, time, and situation and follows directions    Sensation:  Patient  denies numbness/tingling   Edema:  no   Endurance: fair  -    Vitals: room air   Blood Pressure at rest  Blood Pressure during session    Heart Rate at rest   Heart Rate during session     SPO2 at rest  %  SPO2 during session  %     Patient education  Patient educated on role of Physical Therapy, risks of immobility, safety and plan of care, energy conservation,  importance of mobility while in hospital , purse lip breathing, safety , weight bearing status , and seated exercises      Patient response to education:   Pt verbalized understanding Pt demonstrated skill Pt requires further education in this area   Yes Partial Yes      Treatment:  Patient practiced and was instructed/facilitated in the following treatment: Pt assisted with supine exercises. pt assisted to EOB and sat x 15 min to increase dynamic sitting balance and activity tolerance.  Patient educated on WBAT on RLE, weight shifting, and pre-gait activities. Pt stood and performed RLE forwards/backwards but unable to advance LLE due to pain. Pt performed heel/toe shuffle to the 
verbalized understanding Pt demonstrated skill Pt requires further education in this area   Yes Partial Yes      Treatment:  Patient practiced and was instructed/facilitated in the following treatment: Pt assisted to EOB and sat x 15 min due to SOB and slightly dizzy, left lateral lean due to right LE pain. Practiced weight shifting to left buttock while seated EOB. Pt stood as above and took 3-4 steps to bed side chair, with difficulty weight bearing and advancement of RLE.  Pt performed exercise up in chair     Therapeutic Exercises:  ankle pumps, quad sets, heel slide, seated marching, and calf raise    10-20 reps each. Most with minimal ROM and within patients comfort level    At end of session, patient in chair with     call light and phone within reach,  all lines and tubes intact, nursing notified.      Patient would benefit from continued skilled Physical Therapy to improve functional independence and quality of life.         Patient's/ family goals   get stronger    Time in  0850  Time out  0928  Time in  1235  Time out  1245    Total Treatment Time  48 minutes  Total treatment time second session:  10 min      CPT codes:  Therapeutic activities (56030)   28 minutes  2 unit(s)  Therapeutic exercises (35317)   10 minutes  1 unit(s)    Therapeutic activity (94597) 10 minutes, 1 unit     Beryl Gregg PTA Lic# [id number]         
Standing/Walking/Toileting/Moving from bed to chair

## (undated) DEVICE — 4-PORT MANIFOLD: Brand: NEPTUNE 2

## (undated) DEVICE — PEN: MARKING STD 100/CS: Brand: MEDICAL ACTION INDUSTRIES

## (undated) DEVICE — 3M™ IOBAN™ 2 ANTIMICROBIAL INCISE DRAPE 6651EZ: Brand: IOBAN™ 2

## (undated) DEVICE — DOUBLE BASIN SET: Brand: MEDLINE INDUSTRIES, INC.

## (undated) DEVICE — BANDAGE COMPR W6INXL5YD WHT BGE POLY COT M E WRP WV HK AND

## (undated) DEVICE — SPONGE LAP W18XL18IN WHT COT 4 PLY FLD STRUNG RADPQ DISP ST

## (undated) DEVICE — MARKER,SKIN,WI/RULER AND LABELS: Brand: MEDLINE

## (undated) DEVICE — NEEDLE HYPO 18GA L1.5IN PNK POLYPR HUB S STL THN WALL FILL

## (undated) DEVICE — K-WIRE
Type: IMPLANTABLE DEVICE | Site: FEMUR | Status: NON-FUNCTIONAL
Removed: 2024-07-03

## (undated) DEVICE — GAUZE,SPONGE,4"X4",16PLY,STRL,LF,10/TRAY: Brand: MEDLINE

## (undated) DEVICE — COVER,TABLE,60X90,STERILE: Brand: MEDLINE

## (undated) DEVICE — GAUZE,SPONGE,4"X4",16PLY,XRAY,STRL,LF: Brand: MEDLINE

## (undated) DEVICE — GLOVE SURG SZ 85 L12IN FNGR THK94MIL STD WHT LTX FREE

## (undated) DEVICE — TOWEL,OR,DSP,ST,BLUE,STD,6/PK,12PK/CS: Brand: MEDLINE

## (undated) DEVICE — TUBING, SUCTION, 1/4" X 10', STRAIGHT: Brand: MEDLINE

## (undated) DEVICE — BANDAGE COMPR FOAM 5 YDX6 IN TAN STRL COFLX

## (undated) DEVICE — DRAPE,EXTREMITY,89X128,STERILE: Brand: MEDLINE

## (undated) DEVICE — MASTISOL ADHESIVE LIQ 2/3ML

## (undated) DEVICE — WIPES SKIN CLOTH READYPREP 9 X 10.5 IN 2% CHLORHEX GLUCONATE CHG PREOP

## (undated) DEVICE — DRESSING HYDROFIBER AQUACEL AG ADVANTAGE 3.5X6 IN

## (undated) DEVICE — FREEHAND DRILL

## (undated) DEVICE — GOWN,SIRUS,FABRNF,XL,20/CS: Brand: MEDLINE

## (undated) DEVICE — TIBURON EXTREMITY SHEET: Brand: CONVERTORS

## (undated) DEVICE — INTENDED FOR TISSUE SEPARATION, AND OTHER PROCEDURES THAT REQUIRE A SHARP SURGICAL BLADE TO PUNCTURE OR CUT.: Brand: BARD-PARKER ® STAINLESS STEEL BLADES

## (undated) DEVICE — YANKAUER,BULB TIP,W/O VENT,RIGID,STERILE: Brand: MEDLINE

## (undated) DEVICE — 3M™ STERI-STRIP™ REINFORCED ADHESIVE SKIN CLOSURES, R1541, 1/4 IN X 3 IN (6 MM X 75 MM), 3 STRIPS/ENVELOPE: Brand: 3M™ STERI-STRIP™

## (undated) DEVICE — 1810 FOAM BLOCK NEEDLE COUNTER: Brand: DEVON

## (undated) DEVICE — NEEDLE HYPO 25GA L1.5IN BLU POLYPR HUB S STL REG BVL STR

## (undated) DEVICE — SURGICAL PROCEDURE PACK BASIC

## (undated) DEVICE — SURGICAL PROCEDURE PACK ORTH IV ECLIPSE STRL

## (undated) DEVICE — MEDI-VAC NON-CONDUCTIVE SUCTION TUBING: Brand: CARDINAL HEALTH

## (undated) DEVICE — SPONGE LAP W18XL18IN WHT COT 4 PLY FLD STRUNG RADPQ DISP ST 2 PER PACK

## (undated) DEVICE — SYRINGE IRRIG 60ML SFT PLIABLE BLB EZ TO GRP 1 HND USE W/

## (undated) DEVICE — TOWEL OR BLUEE 16X26IN ST 8 PACK ORB08 16X26ORTWL

## (undated) DEVICE — 12 ML SYRINGE,LUER-LOCK TIP: Brand: MONOJECT

## (undated) DEVICE — GLOVE ORTHO 8   MSG9480

## (undated) DEVICE — BANDAGE,GAUZE,CONFORMING,3"X75",STRL,LF: Brand: MEDLINE

## (undated) DEVICE — SPLINT KNEE UNIV FOR LESS THAN 36IN L24IN FOAM LAM E CNTCT

## (undated) DEVICE — SHEET,DRAPE,70X100,STERILE: Brand: MEDLINE

## (undated) DEVICE — GUIDE WIRE, BALL-TIPPED, STERILE

## (undated) DEVICE — COUNTERBORE DRILL, MANUAL: Brand: T2

## (undated) DEVICE — CHLORAPREP 26ML ORANGE

## (undated) DEVICE — CYSTO/BLADDER IRRIGATION SET, REGULATING CLAMP

## (undated) DEVICE — LOCKING DRILL

## (undated) DEVICE — Device

## (undated) DEVICE — SYRINGE BLB 50CC IRRIG PLIABLE FNGR FLNG GRAD FLSK DISP

## (undated) DEVICE — 450 ML BOTTLE OF 0.05% CHLORHEXIDINE GLUCONATE IN 99.95% STERILE WATER FOR IRRIGATION, USP AND APPLICATOR.: Brand: IRRISEPT ANTIMICROBIAL WOUND LAVAGE

## (undated) DEVICE — BLADE,STAINLESS-STEEL,10,STRL,DISPOSABLE: Brand: MEDLINE

## (undated) DEVICE — ELECTRODE PT RET AD L9FT HI MOIST COND ADH HYDRGEL CORDED

## (undated) DEVICE — DRESSING HYDROFIBER AQUACEL AG ADVANTAGE 3.5X12 IN

## (undated) DEVICE — MEDI-VAC YANKAUER SUCTION HANDLE: Brand: CARDINAL HEALTH

## (undated) DEVICE — STERILE SLEEVE: Brand: CONVERTORS

## (undated) DEVICE — BANDAGE COMPR W4INXL10YD WHITE/BEIGE E MTRX HK LOOP CLSR

## (undated) DEVICE — SOLUTION IV IRRIG WATER 1000ML POUR BRL 2F7114

## (undated) DEVICE — COVER,LIGHT HANDLE,FLX,1/PK: Brand: MEDLINE INDUSTRIES, INC.

## (undated) DEVICE — Z CONVERTED USE 2275207 CLOTH PREP W7.5XL7.5IN 2% CHG SKIN ALC AND RNS FREE

## (undated) DEVICE — SOLUTION IV IRRIG POUR BRL 0.9% SODIUM CHL 2F7124

## (undated) DEVICE — BASIC DOUBLE BASIN 2-LF: Brand: MEDLINE INDUSTRIES, INC.

## (undated) DEVICE — BANDAGE,GAUZE,BULKEE II,4.5"X4.1YD,STRL: Brand: MEDLINE

## (undated) DEVICE — GOWN,SIRUS,NON REINFRCD,LARGE,SET IN SL: Brand: MEDLINE

## (undated) DEVICE — GOWN,SIRUS,POLYRNF,BRTHSLV,XLN/XL,20/CS: Brand: MEDLINE

## (undated) DEVICE — PRECISION THIN (9.0 X 0.38 X 25.0MM)

## (undated) DEVICE — DRESSING PETRO W3XL8IN OIL EMUL N ADH GZ KNIT IMPREG CELOS

## (undated) DEVICE — GLOVE,SURG,SENSICARE,ALOE,LF,PF,7: Brand: MEDLINE

## (undated) DEVICE — DRAPE C ARM W41XL65IN UNIV W/ CLP AND RUBBERBAND

## (undated) DEVICE — NDL CNTR 40CT FM MAG: Brand: MEDLINE INDUSTRIES, INC.

## (undated) DEVICE — REAMER SHAFT, MOD.TRINKLE: Brand: BIXCUT

## (undated) DEVICE — SYRINGE MED 50ML LUERLOCK TIP

## (undated) DEVICE — NEEDLE FLTR 18GA L1.5IN MEM THK5UM BLNT DISP

## (undated) DEVICE — ELECTROSURGICAL PENCIL BUTTON SWITCH NON COATED BLADE ELECTRODE 10 FT (3 M) CORD HOLSTER: Brand: MEGADYNE

## (undated) DEVICE — APPLICATOR MEDICATED 26 CC SOLUTION HI LT ORNG CHLORAPREP

## (undated) DEVICE — GLOVE,EXAM,NITRILE,RESTORE,OAT SENSE,L: Brand: MEDLINE